# Patient Record
Sex: FEMALE | Race: WHITE | NOT HISPANIC OR LATINO | URBAN - METROPOLITAN AREA
[De-identification: names, ages, dates, MRNs, and addresses within clinical notes are randomized per-mention and may not be internally consistent; named-entity substitution may affect disease eponyms.]

---

## 2017-06-07 ENCOUNTER — ON CAMPUS - OUTPATIENT (AMBULATORY)
Dept: URBAN - METROPOLITAN AREA HOSPITAL 2 | Facility: HOSPITAL | Age: 67
End: 2017-06-07

## 2017-06-07 ENCOUNTER — OFFICE (AMBULATORY)
Dept: URBAN - METROPOLITAN AREA CLINIC 64 | Facility: CLINIC | Age: 67
End: 2017-06-07

## 2017-06-07 ENCOUNTER — HOSPITAL ENCOUNTER (OUTPATIENT)
Dept: OTHER | Facility: HOSPITAL | Age: 67
Setting detail: SPECIMEN
Discharge: HOME | End: 2017-06-07
Attending: INTERNAL MEDICINE | Admitting: INTERNAL MEDICINE

## 2017-06-07 VITALS
RESPIRATION RATE: 18 BRPM | HEART RATE: 60 BPM | SYSTOLIC BLOOD PRESSURE: 119 MMHG | HEART RATE: 69 BPM | OXYGEN SATURATION: 98 % | DIASTOLIC BLOOD PRESSURE: 81 MMHG | HEART RATE: 78 BPM | OXYGEN SATURATION: 95 % | TEMPERATURE: 97.6 F | SYSTOLIC BLOOD PRESSURE: 122 MMHG | RESPIRATION RATE: 16 BRPM | HEART RATE: 57 BPM | DIASTOLIC BLOOD PRESSURE: 70 MMHG | OXYGEN SATURATION: 94 % | SYSTOLIC BLOOD PRESSURE: 115 MMHG | WEIGHT: 238 LBS | HEART RATE: 79 BPM | RESPIRATION RATE: 20 BRPM | DIASTOLIC BLOOD PRESSURE: 65 MMHG | DIASTOLIC BLOOD PRESSURE: 55 MMHG | OXYGEN SATURATION: 96 % | SYSTOLIC BLOOD PRESSURE: 137 MMHG | HEART RATE: 56 BPM | DIASTOLIC BLOOD PRESSURE: 62 MMHG | SYSTOLIC BLOOD PRESSURE: 133 MMHG | HEIGHT: 61 IN | SYSTOLIC BLOOD PRESSURE: 116 MMHG

## 2017-06-07 DIAGNOSIS — K22.2 ESOPHAGEAL OBSTRUCTION: ICD-10-CM

## 2017-06-07 DIAGNOSIS — K29.70 GASTRITIS, UNSPECIFIED, WITHOUT BLEEDING: ICD-10-CM

## 2017-06-07 DIAGNOSIS — K31.9 DISEASE OF STOMACH AND DUODENUM, UNSPECIFIED: ICD-10-CM

## 2017-06-07 DIAGNOSIS — K26.7 CHRONIC DUODENAL ULCER WITHOUT HEMORRHAGE OR PERFORATION: ICD-10-CM

## 2017-06-07 DIAGNOSIS — R13.10 DYSPHAGIA, UNSPECIFIED: ICD-10-CM

## 2017-06-07 DIAGNOSIS — R07.89 OTHER CHEST PAIN: ICD-10-CM

## 2017-06-07 LAB
GI HISTOLOGY: A. SELECT: (no result)
GI HISTOLOGY: PDF REPORT: (no result)

## 2017-06-07 PROCEDURE — 88342 IMHCHEM/IMCYTCHM 1ST ANTB: CPT | Mod: 26 | Performed by: INTERNAL MEDICINE

## 2017-06-07 PROCEDURE — 43239 EGD BIOPSY SINGLE/MULTIPLE: CPT | Performed by: INTERNAL MEDICINE

## 2017-06-07 PROCEDURE — 88305 TISSUE EXAM BY PATHOLOGIST: CPT | Mod: 26 | Performed by: INTERNAL MEDICINE

## 2017-06-07 PROCEDURE — 43450 DILATE ESOPHAGUS 1/MULT PASS: CPT | Performed by: INTERNAL MEDICINE

## 2017-06-07 RX ORDER — PANTOPRAZOLE SODIUM 40 MG/1
40 TABLET, DELAYED RELEASE ORAL
Qty: 90 | Refills: 3 | Status: ACTIVE
Start: 2017-06-07

## 2017-06-07 RX ADMIN — PROPOFOL: 10 INJECTION, EMULSION INTRAVENOUS at 11:35

## 2020-01-01 ENCOUNTER — APPOINTMENT (OUTPATIENT)
Dept: CARDIOLOGY | Facility: HOSPITAL | Age: 70
End: 2020-01-01

## 2020-01-01 ENCOUNTER — HOSPITAL ENCOUNTER (OUTPATIENT)
Dept: ONCOLOGY | Facility: HOSPITAL | Age: 70
Setting detail: INFUSION SERIES
Discharge: HOME | End: 2020-05-06

## 2020-01-01 ENCOUNTER — HOSPITAL ENCOUNTER (OUTPATIENT)
Dept: RADIATION ONCOLOGY | Facility: HOSPITAL | Age: 70
Setting detail: RADIATION/ONCOLOGY SERIES
End: 2020-01-01

## 2020-01-01 ENCOUNTER — HOSPITAL ENCOUNTER (OUTPATIENT)
Dept: ONCOLOGY | Facility: HOSPITAL | Age: 70
Setting detail: INFUSION SERIES
End: 2020-01-01

## 2020-01-01 ENCOUNTER — OFFICE VISIT (OUTPATIENT)
Dept: ONCOLOGY | Facility: CLINIC | Age: 70
End: 2020-01-01

## 2020-01-01 ENCOUNTER — HOSPITAL ENCOUNTER (OUTPATIENT)
Dept: ONCOLOGY | Facility: HOSPITAL | Age: 70
Setting detail: INFUSION SERIES
Discharge: HOME | End: 2020-06-30

## 2020-01-01 ENCOUNTER — APPOINTMENT (OUTPATIENT)
Dept: ONCOLOGY | Facility: HOSPITAL | Age: 70
End: 2020-01-01

## 2020-01-01 ENCOUNTER — TELEPHONE (OUTPATIENT)
Dept: ONCOLOGY | Facility: HOSPITAL | Age: 70
End: 2020-01-01

## 2020-01-01 ENCOUNTER — HOSPITAL ENCOUNTER (OUTPATIENT)
Dept: GENERAL RADIOLOGY | Facility: HOSPITAL | Age: 70
Discharge: HOME | End: 2020-11-10

## 2020-01-01 ENCOUNTER — HOSPITAL ENCOUNTER (OUTPATIENT)
Dept: RADIATION ONCOLOGY | Facility: HOSPITAL | Age: 70
Discharge: HOME | End: 2020-07-06

## 2020-01-01 ENCOUNTER — CONSULT (OUTPATIENT)
Dept: ONCOLOGY | Facility: CLINIC | Age: 70
End: 2020-01-01

## 2020-01-01 ENCOUNTER — OFFICE VISIT (OUTPATIENT)
Dept: RADIATION ONCOLOGY | Facility: HOSPITAL | Age: 70
End: 2020-01-01

## 2020-01-01 ENCOUNTER — HOSPITAL ENCOUNTER (OUTPATIENT)
Dept: ONCOLOGY | Facility: HOSPITAL | Age: 70
Setting detail: INFUSION SERIES
Discharge: HOME | End: 2020-04-08

## 2020-01-01 ENCOUNTER — DOCUMENTATION (OUTPATIENT)
Dept: RADIATION ONCOLOGY | Facility: HOSPITAL | Age: 70
End: 2020-01-01

## 2020-01-01 ENCOUNTER — TELEPHONE (OUTPATIENT)
Dept: RADIATION ONCOLOGY | Facility: HOSPITAL | Age: 70
End: 2020-01-01

## 2020-01-01 ENCOUNTER — HOSPITAL ENCOUNTER (OUTPATIENT)
Facility: HOSPITAL | Age: 70
Setting detail: HOSPITAL OUTPATIENT SURGERY
Discharge: HOME | End: 2020-11-12
Attending: INTERNAL MEDICINE | Admitting: INTERNAL MEDICINE

## 2020-01-01 ENCOUNTER — APPOINTMENT (OUTPATIENT)
Dept: ONCOLOGY | Facility: CLINIC | Age: 70
End: 2020-01-01

## 2020-01-01 ENCOUNTER — HOSPITAL ENCOUNTER (OUTPATIENT)
Dept: RADIATION ONCOLOGY | Facility: HOSPITAL | Age: 70
Discharge: HOME | End: 2020-07-09

## 2020-01-01 ENCOUNTER — HOSPITAL ENCOUNTER (OUTPATIENT)
Dept: RADIATION ONCOLOGY | Facility: HOSPITAL | Age: 70
Setting detail: RADIATION/ONCOLOGY SERIES
Discharge: HOME | End: 2020-06-30

## 2020-01-01 ENCOUNTER — HOSPITAL ENCOUNTER (OUTPATIENT)
Dept: RADIATION ONCOLOGY | Facility: HOSPITAL | Age: 70
Discharge: HOME | End: 2020-07-01

## 2020-01-01 ENCOUNTER — HOSPITAL ENCOUNTER (OUTPATIENT)
Dept: RADIATION ONCOLOGY | Facility: HOSPITAL | Age: 70
Discharge: HOME | End: 2020-06-30

## 2020-01-01 ENCOUNTER — OFFICE VISIT (OUTPATIENT)
Dept: CARDIOLOGY | Facility: CLINIC | Age: 70
End: 2020-01-01

## 2020-01-01 ENCOUNTER — TELEPHONE (OUTPATIENT)
Dept: ONCOLOGY | Facility: CLINIC | Age: 70
End: 2020-01-01

## 2020-01-01 ENCOUNTER — HOSPITAL ENCOUNTER (OUTPATIENT)
Dept: RADIATION ONCOLOGY | Facility: HOSPITAL | Age: 70
Discharge: HOME | End: 2020-07-13

## 2020-01-01 ENCOUNTER — TELEPHONE (OUTPATIENT)
Dept: CARDIOLOGY | Facility: CLINIC | Age: 70
End: 2020-01-01

## 2020-01-01 ENCOUNTER — HOSPITAL ENCOUNTER (OUTPATIENT)
Dept: RADIATION ONCOLOGY | Facility: HOSPITAL | Age: 70
Discharge: HOME | End: 2020-07-15

## 2020-01-01 ENCOUNTER — HOSPITAL ENCOUNTER (OUTPATIENT)
Dept: RADIATION ONCOLOGY | Facility: HOSPITAL | Age: 70
Discharge: HOME | End: 2020-07-07

## 2020-01-01 ENCOUNTER — CONSULT (OUTPATIENT)
Dept: RADIATION ONCOLOGY | Facility: HOSPITAL | Age: 70
End: 2020-01-01

## 2020-01-01 ENCOUNTER — HOSPITAL ENCOUNTER (OUTPATIENT)
Dept: RADIATION ONCOLOGY | Facility: HOSPITAL | Age: 70
Discharge: HOME | End: 2020-07-02

## 2020-01-01 ENCOUNTER — APPOINTMENT (OUTPATIENT)
Dept: LAB | Facility: HOSPITAL | Age: 70
End: 2020-01-01

## 2020-01-01 ENCOUNTER — HOSPITAL ENCOUNTER (OUTPATIENT)
Dept: RADIATION ONCOLOGY | Facility: HOSPITAL | Age: 70
Discharge: HOME | End: 2020-07-10

## 2020-01-01 ENCOUNTER — HOSPITAL ENCOUNTER (OUTPATIENT)
Dept: ONCOLOGY | Facility: HOSPITAL | Age: 70
Setting detail: INFUSION SERIES
Discharge: HOME | End: 2020-06-08

## 2020-01-01 ENCOUNTER — DOCUMENTATION (OUTPATIENT)
Dept: ONCOLOGY | Facility: HOSPITAL | Age: 70
End: 2020-01-01

## 2020-01-01 ENCOUNTER — LAB (OUTPATIENT)
Dept: LAB | Facility: HOSPITAL | Age: 70
End: 2020-01-01

## 2020-01-01 ENCOUNTER — HOSPITAL ENCOUNTER (OUTPATIENT)
Dept: RADIATION ONCOLOGY | Facility: HOSPITAL | Age: 70
Discharge: HOME | End: 2020-07-14

## 2020-01-01 ENCOUNTER — HOSPITAL ENCOUNTER (OUTPATIENT)
Dept: RADIATION ONCOLOGY | Facility: HOSPITAL | Age: 70
Discharge: HOME | End: 2020-07-08

## 2020-01-01 ENCOUNTER — HOSPITAL ENCOUNTER (OUTPATIENT)
Dept: ONCOLOGY | Facility: HOSPITAL | Age: 70
Setting detail: INFUSION SERIES
Discharge: HOME | End: 2020-12-08

## 2020-01-01 ENCOUNTER — HOSPITAL ENCOUNTER (OUTPATIENT)
Dept: ONCOLOGY | Facility: HOSPITAL | Age: 70
Setting detail: INFUSION SERIES
Discharge: HOME | End: 2020-08-03

## 2020-01-01 ENCOUNTER — DOCUMENTATION (OUTPATIENT)
Dept: LAB | Facility: HOSPITAL | Age: 70
End: 2020-01-01

## 2020-01-01 ENCOUNTER — HOSPITAL ENCOUNTER (OUTPATIENT)
Dept: PET IMAGING | Facility: HOSPITAL | Age: 70
Discharge: HOME | End: 2020-10-14
Admitting: RADIOLOGY

## 2020-01-01 ENCOUNTER — HOSPITAL ENCOUNTER (OUTPATIENT)
Dept: ONCOLOGY | Facility: HOSPITAL | Age: 70
Setting detail: INFUSION SERIES
Discharge: HOME | End: 2020-06-03

## 2020-01-01 VITALS
HEART RATE: 79 BPM | HEIGHT: 61 IN | RESPIRATION RATE: 18 BRPM | BODY MASS INDEX: 39.65 KG/M2 | SYSTOLIC BLOOD PRESSURE: 143 MMHG | WEIGHT: 210 LBS | DIASTOLIC BLOOD PRESSURE: 66 MMHG | TEMPERATURE: 97.5 F

## 2020-01-01 VITALS
DIASTOLIC BLOOD PRESSURE: 75 MMHG | TEMPERATURE: 97.8 F | HEART RATE: 90 BPM | BODY MASS INDEX: 40.22 KG/M2 | SYSTOLIC BLOOD PRESSURE: 139 MMHG | HEIGHT: 61 IN | WEIGHT: 213 LBS | OXYGEN SATURATION: 94 %

## 2020-01-01 VITALS
BODY MASS INDEX: 38.59 KG/M2 | DIASTOLIC BLOOD PRESSURE: 70 MMHG | SYSTOLIC BLOOD PRESSURE: 136 MMHG | WEIGHT: 204.39 LBS | RESPIRATION RATE: 17 BRPM | TEMPERATURE: 97.5 F | HEART RATE: 64 BPM | HEIGHT: 61 IN | OXYGEN SATURATION: 94 %

## 2020-01-01 VITALS
SYSTOLIC BLOOD PRESSURE: 116 MMHG | TEMPERATURE: 98.2 F | HEIGHT: 61 IN | BODY MASS INDEX: 39.65 KG/M2 | HEART RATE: 102 BPM | DIASTOLIC BLOOD PRESSURE: 73 MMHG | WEIGHT: 210 LBS

## 2020-01-01 VITALS
DIASTOLIC BLOOD PRESSURE: 75 MMHG | RESPIRATION RATE: 24 BRPM | WEIGHT: 209 LBS | OXYGEN SATURATION: 95 % | TEMPERATURE: 98 F | HEART RATE: 84 BPM | HEIGHT: 61 IN | BODY MASS INDEX: 39.46 KG/M2 | SYSTOLIC BLOOD PRESSURE: 161 MMHG

## 2020-01-01 VITALS
TEMPERATURE: 97.3 F | OXYGEN SATURATION: 92 % | BODY MASS INDEX: 35.68 KG/M2 | WEIGHT: 189 LBS | SYSTOLIC BLOOD PRESSURE: 106 MMHG | HEART RATE: 99 BPM | HEIGHT: 61 IN | DIASTOLIC BLOOD PRESSURE: 72 MMHG

## 2020-01-01 VITALS
DIASTOLIC BLOOD PRESSURE: 73 MMHG | BODY MASS INDEX: 40.22 KG/M2 | RESPIRATION RATE: 18 BRPM | WEIGHT: 213 LBS | SYSTOLIC BLOOD PRESSURE: 126 MMHG | TEMPERATURE: 98 F | HEIGHT: 61 IN | HEART RATE: 98 BPM

## 2020-01-01 VITALS
RESPIRATION RATE: 18 BRPM | OXYGEN SATURATION: 94 % | HEIGHT: 61 IN | BODY MASS INDEX: 38.71 KG/M2 | HEART RATE: 89 BPM | WEIGHT: 205 LBS | SYSTOLIC BLOOD PRESSURE: 129 MMHG | TEMPERATURE: 97.3 F | DIASTOLIC BLOOD PRESSURE: 76 MMHG

## 2020-01-01 VITALS
RESPIRATION RATE: 18 BRPM | DIASTOLIC BLOOD PRESSURE: 63 MMHG | BODY MASS INDEX: 38.71 KG/M2 | WEIGHT: 205 LBS | OXYGEN SATURATION: 95 % | SYSTOLIC BLOOD PRESSURE: 90 MMHG | HEIGHT: 61 IN | HEART RATE: 65 BPM

## 2020-01-01 VITALS
HEART RATE: 66 BPM | DIASTOLIC BLOOD PRESSURE: 70 MMHG | SYSTOLIC BLOOD PRESSURE: 112 MMHG | HEIGHT: 61 IN | WEIGHT: 209 LBS | RESPIRATION RATE: 20 BRPM | TEMPERATURE: 97.5 F | BODY MASS INDEX: 39.46 KG/M2

## 2020-01-01 VITALS
BODY MASS INDEX: 38.76 KG/M2 | TEMPERATURE: 98.2 F | HEART RATE: 99 BPM | HEIGHT: 61 IN | RESPIRATION RATE: 18 BRPM | WEIGHT: 205.3 LBS | DIASTOLIC BLOOD PRESSURE: 75 MMHG | SYSTOLIC BLOOD PRESSURE: 132 MMHG

## 2020-01-01 VITALS — HEIGHT: 61 IN | BODY MASS INDEX: 38.71 KG/M2 | WEIGHT: 205 LBS | RESPIRATION RATE: 16 BRPM

## 2020-01-01 VITALS
TEMPERATURE: 97.6 F | WEIGHT: 211 LBS | HEIGHT: 57 IN | DIASTOLIC BLOOD PRESSURE: 76 MMHG | RESPIRATION RATE: 18 BRPM | HEART RATE: 88 BPM | BODY MASS INDEX: 45.52 KG/M2 | SYSTOLIC BLOOD PRESSURE: 156 MMHG

## 2020-01-01 VITALS
WEIGHT: 193 LBS | HEIGHT: 61 IN | DIASTOLIC BLOOD PRESSURE: 73 MMHG | RESPIRATION RATE: 18 BRPM | SYSTOLIC BLOOD PRESSURE: 144 MMHG | OXYGEN SATURATION: 93 % | HEART RATE: 77 BPM | BODY MASS INDEX: 36.44 KG/M2

## 2020-01-01 VITALS
WEIGHT: 204 LBS | DIASTOLIC BLOOD PRESSURE: 85 MMHG | SYSTOLIC BLOOD PRESSURE: 129 MMHG | HEIGHT: 61 IN | TEMPERATURE: 98.4 F | HEART RATE: 92 BPM | BODY MASS INDEX: 38.51 KG/M2

## 2020-01-01 VITALS
TEMPERATURE: 97.3 F | RESPIRATION RATE: 20 BRPM | HEIGHT: 61 IN | OXYGEN SATURATION: 94 % | WEIGHT: 188.9 LBS | HEART RATE: 99 BPM | BODY MASS INDEX: 35.66 KG/M2 | SYSTOLIC BLOOD PRESSURE: 106 MMHG | DIASTOLIC BLOOD PRESSURE: 72 MMHG

## 2020-01-01 VITALS
HEART RATE: 78 BPM | WEIGHT: 205 LBS | BODY MASS INDEX: 38.73 KG/M2 | SYSTOLIC BLOOD PRESSURE: 120 MMHG | OXYGEN SATURATION: 97 % | DIASTOLIC BLOOD PRESSURE: 80 MMHG

## 2020-01-01 DIAGNOSIS — R60.0 EDEMA LEG: ICD-10-CM

## 2020-01-01 DIAGNOSIS — I25.10 CAD S/P PERCUTANEOUS CORONARY ANGIOPLASTY: ICD-10-CM

## 2020-01-01 DIAGNOSIS — C7B.8 NEUROENDOCRINE CARCINOMA METASTATIC TO MULTIPLE SITES (HCC): ICD-10-CM

## 2020-01-01 DIAGNOSIS — Z98.61 CAD S/P PERCUTANEOUS CORONARY ANGIOPLASTY: ICD-10-CM

## 2020-01-01 DIAGNOSIS — C7B.8 NEUROENDOCRINE CARCINOMA METASTATIC TO MULTIPLE SITES (HCC): Primary | ICD-10-CM

## 2020-01-01 DIAGNOSIS — I20.0 UNSTABLE ANGINA PECTORIS (HCC): Primary | ICD-10-CM

## 2020-01-01 DIAGNOSIS — I10 ESSENTIAL HYPERTENSION: ICD-10-CM

## 2020-01-01 DIAGNOSIS — R06.09 DYSPNEA ON EXERTION: ICD-10-CM

## 2020-01-01 DIAGNOSIS — C7A.8 NEUROENDOCRINE CARCINOMA METASTATIC TO MULTIPLE SITES (HCC): Primary | ICD-10-CM

## 2020-01-01 DIAGNOSIS — E66.01 MORBIDLY OBESE (HCC): ICD-10-CM

## 2020-01-01 DIAGNOSIS — E11.9 TYPE 2 DIABETES MELLITUS WITHOUT COMPLICATION, WITHOUT LONG-TERM CURRENT USE OF INSULIN (HCC): ICD-10-CM

## 2020-01-01 DIAGNOSIS — C7A.8 NEUROENDOCRINE CARCINOMA METASTATIC TO MULTIPLE SITES (HCC): ICD-10-CM

## 2020-01-01 DIAGNOSIS — I20.0 UNSTABLE ANGINA PECTORIS (HCC): ICD-10-CM

## 2020-01-01 DIAGNOSIS — R73.9 HYPERGLYCEMIA: ICD-10-CM

## 2020-01-01 DIAGNOSIS — C7A.8 PRIMARY MALIGNANT NEUROENDOCRINE TUMOR OF PANCREAS (HCC): ICD-10-CM

## 2020-01-01 DIAGNOSIS — Z82.49 FAMILY HISTORY OF PREMATURE CAD: ICD-10-CM

## 2020-01-01 DIAGNOSIS — E78.5 DYSLIPIDEMIA: ICD-10-CM

## 2020-01-01 DIAGNOSIS — C7A.8 PRIMARY MALIGNANT NEUROENDOCRINE TUMOR OF PANCREAS (HCC): Primary | ICD-10-CM

## 2020-01-01 LAB
5OH-INDOLEACETATE 24H UR-MCNC: 3.5 MG/L
5OH-INDOLEACETATE 24H UR-MRATE: 3.5 MG/24 HR (ref 0–14.9)
ALBUMIN SERPL-MCNC: 3.8 G/DL (ref 3.5–5.2)
ALBUMIN SERPL-MCNC: 3.9 G/DL (ref 3.5–5.2)
ALBUMIN SERPL-MCNC: 4 G/DL (ref 3.5–5.2)
ALBUMIN SERPL-MCNC: 4.1 G/DL (ref 3.5–5.2)
ALBUMIN SERPL-MCNC: 4.2 G/DL (ref 3.5–5.2)
ALBUMIN SERPL-MCNC: 4.2 G/DL (ref 3.5–5.2)
ALBUMIN SERPL-MCNC: 4.3 G/DL (ref 3.5–5.2)
ALBUMIN/GLOB SERPL: 1.3 G/DL
ALBUMIN/GLOB SERPL: 1.3 G/DL
ALBUMIN/GLOB SERPL: 1.4 G/DL
ALBUMIN/GLOB SERPL: 1.6 G/DL
ALBUMIN/GLOB SERPL: 1.8 G/DL
ALP SERPL-CCNC: 53 U/L (ref 39–117)
ALP SERPL-CCNC: 58 U/L (ref 39–117)
ALP SERPL-CCNC: 59 U/L (ref 39–117)
ALP SERPL-CCNC: 59 U/L (ref 39–117)
ALP SERPL-CCNC: 62 U/L (ref 39–117)
ALP SERPL-CCNC: 63 U/L (ref 39–117)
ALP SERPL-CCNC: 63 U/L (ref 39–117)
ALT SERPL W P-5'-P-CCNC: 12 U/L (ref 1–33)
ALT SERPL W P-5'-P-CCNC: 15 U/L (ref 1–33)
ALT SERPL W P-5'-P-CCNC: 17 U/L (ref 1–33)
ALT SERPL W P-5'-P-CCNC: 19 U/L (ref 1–33)
ALT SERPL W P-5'-P-CCNC: 20 U/L (ref 1–33)
ALT SERPL W P-5'-P-CCNC: 21 U/L (ref 1–33)
ALT SERPL W P-5'-P-CCNC: 23 U/L (ref 1–33)
ANION GAP SERPL CALCULATED.3IONS-SCNC: 11 MMOL/L (ref 5–15)
ANION GAP SERPL CALCULATED.3IONS-SCNC: 11 MMOL/L (ref 5–15)
ANION GAP SERPL CALCULATED.3IONS-SCNC: 13 MMOL/L (ref 5–15)
ANION GAP SERPL CALCULATED.3IONS-SCNC: 14 MMOL/L (ref 5–15)
ANION GAP SERPL CALCULATED.3IONS-SCNC: 15 MMOL/L (ref 5–15)
ANION GAP SERPL CALCULATED.3IONS-SCNC: 8.7 MMOL/L (ref 5–15)
AST SERPL-CCNC: 19 U/L (ref 1–32)
AST SERPL-CCNC: 20 U/L (ref 1–32)
AST SERPL-CCNC: 23 U/L (ref 1–32)
AST SERPL-CCNC: 25 U/L (ref 1–32)
AST SERPL-CCNC: 26 U/L (ref 1–32)
AST SERPL-CCNC: 26 U/L (ref 1–32)
AST SERPL-CCNC: 27 U/L (ref 1–32)
BASOPHILS # BLD AUTO: 0.03 10*3/MM3 (ref 0–0.2)
BASOPHILS # BLD AUTO: 0.04 10*3/MM3 (ref 0–0.2)
BASOPHILS # BLD AUTO: 0.04 10*3/MM3 (ref 0–0.2)
BASOPHILS # BLD AUTO: 0.05 10*3/MM3 (ref 0–0.2)
BASOPHILS # BLD AUTO: 0.06 10*3/MM3 (ref 0–0.2)
BASOPHILS # BLD AUTO: 0.1 10*3/MM3 (ref 0–0.2)
BASOPHILS # BLD AUTO: 0.1 X10E3/UL (ref 0–0.2)
BASOPHILS NFR BLD AUTO: 0.4 % (ref 0–1.5)
BASOPHILS NFR BLD AUTO: 0.5 % (ref 0–1.5)
BASOPHILS NFR BLD AUTO: 0.6 % (ref 0–1.5)
BASOPHILS NFR BLD AUTO: 0.7 % (ref 0–1.5)
BASOPHILS NFR BLD AUTO: 1 %
BASOPHILS NFR BLD AUTO: 1.4 % (ref 0–1.5)
BILIRUB SERPL-MCNC: 0.4 MG/DL (ref 0.2–1.2)
BILIRUB SERPL-MCNC: 0.5 MG/DL (ref 0.2–1.2)
BILIRUB SERPL-MCNC: 0.5 MG/DL (ref 0–1.2)
BILIRUB SERPL-MCNC: 0.6 MG/DL (ref 0.2–1.2)
BILIRUB SERPL-MCNC: 0.7 MG/DL (ref 0.2–1.2)
BILIRUB SERPL-MCNC: 0.7 MG/DL (ref 0.2–1.2)
BILIRUB SERPL-MCNC: 0.7 MG/DL (ref 0–1.2)
BUN BLD-MCNC: 13 MG/DL (ref 8–23)
BUN BLD-MCNC: 14 MG/DL (ref 8–23)
BUN BLD-MCNC: 15 MG/DL (ref 8–23)
BUN BLD-MCNC: 18 MG/DL (ref 8–23)
BUN BLD-MCNC: 20 MG/DL (ref 8–23)
BUN BLD-MCNC: ABNORMAL MG/DL
BUN SERPL-MCNC: 17 MG/DL (ref 8–23)
BUN SERPL-MCNC: 17 MG/DL (ref 8–23)
BUN SERPL-MCNC: 18 MG/DL (ref 8–23)
BUN SERPL-MCNC: 19 MG/DL (ref 8–23)
BUN SERPL-MCNC: ABNORMAL MG/DL
BUN SERPL-MCNC: ABNORMAL MG/DL
BUN/CREAT SERPL: 15.7 (ref 7–25)
BUN/CREAT SERPL: 17.9 (ref 7–25)
BUN/CREAT SERPL: 20.2 (ref 7–25)
BUN/CREAT SERPL: 21.2 (ref 7–25)
BUN/CREAT SERPL: 22.1 (ref 7–25)
BUN/CREAT SERPL: 23.5 (ref 7–25)
BUN/CREAT SERPL: ABNORMAL
CALCIUM SPEC-SCNC: 10.2 MG/DL (ref 8.6–10.5)
CALCIUM SPEC-SCNC: 10.4 MG/DL (ref 8.6–10.5)
CALCIUM SPEC-SCNC: 10.5 MG/DL (ref 8.6–10.5)
CALCIUM SPEC-SCNC: 10.6 MG/DL (ref 8.6–10.5)
CALCIUM SPEC-SCNC: 11.2 MG/DL (ref 8.6–10.5)
CALCIUM SPEC-SCNC: 9.9 MG/DL (ref 8.6–10.5)
CALCIUM SPEC-SCNC: 9.9 MG/DL (ref 8.6–10.5)
CEA SERPL-MCNC: 4.84 NG/ML
CHLORIDE SERPL-SCNC: 102 MMOL/L (ref 98–107)
CHLORIDE SERPL-SCNC: 103 MMOL/L (ref 98–107)
CHLORIDE SERPL-SCNC: 104 MMOL/L (ref 98–107)
CHLORIDE SERPL-SCNC: 104 MMOL/L (ref 98–107)
CHLORIDE SERPL-SCNC: 106 MMOL/L (ref 98–107)
CO2 SERPL-SCNC: 21 MMOL/L (ref 22–29)
CO2 SERPL-SCNC: 23 MMOL/L (ref 22–29)
CO2 SERPL-SCNC: 24 MMOL/L (ref 22–29)
CO2 SERPL-SCNC: 25 MMOL/L (ref 22–29)
CO2 SERPL-SCNC: 25 MMOL/L (ref 22–29)
CO2 SERPL-SCNC: 27 MMOL/L (ref 22–29)
CO2 SERPL-SCNC: 27.3 MMOL/L (ref 22–29)
CREAT BLD-MCNC: 0.78 MG/DL (ref 0.57–1)
CREAT BLD-MCNC: 0.83 MG/DL (ref 0.57–1)
CREAT BLD-MCNC: 0.84 MG/DL (ref 0.57–1)
CREAT BLD-MCNC: 0.85 MG/DL (ref 0.57–1)
CREAT BLD-MCNC: 0.89 MG/DL (ref 0.57–1)
CREAT BLDA-MCNC: 0.6 MG/DL (ref 0.6–1.3)
CREAT SERPL-MCNC: 0.77 MG/DL (ref 0.57–1)
CREAT SERPL-MCNC: 0.79 MG/DL (ref 0.57–1)
CREAT SERPL-MCNC: 0.79 MG/DL (ref 0.57–1)
CREAT SERPL-MCNC: 0.85 MG/DL (ref 0.57–1)
DEPRECATED RDW RBC AUTO: 40.1 FL (ref 37–54)
DEPRECATED RDW RBC AUTO: 43.6 FL (ref 37–54)
DEPRECATED RDW RBC AUTO: 44.3 FL (ref 37–54)
DEPRECATED RDW RBC AUTO: 45.1 FL (ref 37–54)
DEPRECATED RDW RBC AUTO: 45.5 FL (ref 37–54)
DEPRECATED RDW RBC AUTO: 45.9 FL (ref 37–54)
DEPRECATED RDW RBC AUTO: 46.2 FL (ref 37–54)
DEPRECATED RDW RBC AUTO: 46.4 FL (ref 37–54)
EOSINOPHIL # BLD AUTO: 0.2 X10E3/UL (ref 0–0.4)
EOSINOPHIL # BLD AUTO: 0.23 10*3/MM3 (ref 0–0.4)
EOSINOPHIL # BLD AUTO: 0.28 10*3/MM3 (ref 0–0.4)
EOSINOPHIL # BLD AUTO: 0.31 10*3/MM3 (ref 0–0.4)
EOSINOPHIL # BLD AUTO: 0.4 10*3/MM3 (ref 0–0.4)
EOSINOPHIL # BLD AUTO: 0.43 10*3/MM3 (ref 0–0.4)
EOSINOPHIL # BLD AUTO: 0.5 10*3/MM3 (ref 0–0.4)
EOSINOPHIL # BLD AUTO: 0.9 10*3/MM3 (ref 0–0.4)
EOSINOPHIL # BLD AUTO: 1.08 10*3/MM3 (ref 0–0.4)
EOSINOPHIL # BLD AUTO: 3 %
EOSINOPHIL NFR BLD AUTO: 10.4 % (ref 0.3–6.2)
EOSINOPHIL NFR BLD AUTO: 12.8 % (ref 0.3–6.2)
EOSINOPHIL NFR BLD AUTO: 3.7 % (ref 0.3–6.2)
EOSINOPHIL NFR BLD AUTO: 4 % (ref 0.3–6.2)
EOSINOPHIL NFR BLD AUTO: 4.1 % (ref 0.3–6.2)
EOSINOPHIL NFR BLD AUTO: 4.3 % (ref 0.3–6.2)
EOSINOPHIL NFR BLD AUTO: 4.5 % (ref 0.3–6.2)
EOSINOPHIL NFR BLD AUTO: 5.6 % (ref 0.3–6.2)
ERYTHROCYTE [DISTWIDTH] IN BLOOD BY AUTOMATED COUNT: 13 % (ref 12.3–15.4)
ERYTHROCYTE [DISTWIDTH] IN BLOOD BY AUTOMATED COUNT: 13.1 % (ref 11.7–15.4)
ERYTHROCYTE [DISTWIDTH] IN BLOOD BY AUTOMATED COUNT: 14.2 % (ref 12.3–15.4)
ERYTHROCYTE [DISTWIDTH] IN BLOOD BY AUTOMATED COUNT: 14.2 % (ref 12.3–15.4)
ERYTHROCYTE [DISTWIDTH] IN BLOOD BY AUTOMATED COUNT: 14.4 % (ref 12.3–15.4)
ERYTHROCYTE [DISTWIDTH] IN BLOOD BY AUTOMATED COUNT: 14.6 % (ref 12.3–15.4)
ERYTHROCYTE [DISTWIDTH] IN BLOOD BY AUTOMATED COUNT: 14.8 % (ref 12.3–15.4)
GFR SERPL CREATININE-BSD FRML MDRD: 63 ML/MIN/1.73
GFR SERPL CREATININE-BSD FRML MDRD: 66 ML/MIN/1.73
GFR SERPL CREATININE-BSD FRML MDRD: 66 ML/MIN/1.73
GFR SERPL CREATININE-BSD FRML MDRD: 67 ML/MIN/1.73
GFR SERPL CREATININE-BSD FRML MDRD: 68 ML/MIN/1.73
GFR SERPL CREATININE-BSD FRML MDRD: 72 ML/MIN/1.73
GFR SERPL CREATININE-BSD FRML MDRD: 72 ML/MIN/1.73
GFR SERPL CREATININE-BSD FRML MDRD: 73 ML/MIN/1.73
GFR SERPL CREATININE-BSD FRML MDRD: 74 ML/MIN/1.73
GLOBULIN UR ELPH-MCNC: 2.3 GM/DL
GLOBULIN UR ELPH-MCNC: 2.7 GM/DL
GLOBULIN UR ELPH-MCNC: 2.9 GM/DL
GLOBULIN UR ELPH-MCNC: 3 GM/DL
GLUCOSE BLD-MCNC: 112 MG/DL (ref 65–99)
GLUCOSE BLD-MCNC: 115 MG/DL (ref 65–99)
GLUCOSE BLD-MCNC: 128 MG/DL (ref 65–99)
GLUCOSE BLD-MCNC: 158 MG/DL (ref 65–99)
GLUCOSE BLD-MCNC: 239 MG/DL (ref 65–99)
GLUCOSE BLDC GLUCOMTR-MCNC: 157 MG/DL (ref 70–105)
GLUCOSE SERPL-MCNC: 148 MG/DL (ref 65–99)
GLUCOSE SERPL-MCNC: 165 MG/DL (ref 65–99)
GLUCOSE SERPL-MCNC: 177 MG/DL (ref 65–99)
GLUCOSE SERPL-MCNC: 77 MG/DL (ref 65–99)
HCT VFR BLD AUTO: 37.9 % (ref 34–46.6)
HCT VFR BLD AUTO: 39.4 % (ref 34–46.6)
HCT VFR BLD AUTO: 40.5 % (ref 34–46.6)
HCT VFR BLD AUTO: 40.6 % (ref 34–46.6)
HCT VFR BLD AUTO: 41 % (ref 34–46.6)
HCT VFR BLD AUTO: 41.9 % (ref 34–46.6)
HCT VFR BLD AUTO: 42.1 % (ref 34–46.6)
HCT VFR BLD AUTO: 43.2 % (ref 34–46.6)
HCT VFR BLD AUTO: 43.4 % (ref 34–46.6)
HGB BLD-MCNC: 12.7 G/DL (ref 12–15.9)
HGB BLD-MCNC: 13.1 G/DL (ref 12–15.9)
HGB BLD-MCNC: 13.1 G/DL (ref 12–15.9)
HGB BLD-MCNC: 13.2 G/DL (ref 12–15.9)
HGB BLD-MCNC: 13.3 G/DL (ref 12–15.9)
HGB BLD-MCNC: 13.7 G/DL (ref 11.1–15.9)
HGB BLD-MCNC: 13.9 G/DL (ref 12–15.9)
HGB BLD-MCNC: 14 G/DL (ref 12–15.9)
HGB BLD-MCNC: 14.2 G/DL (ref 12–15.9)
IMM GRANULOCYTES # BLD AUTO: 0.07 10*3/MM3 (ref 0–0.05)
IMM GRANULOCYTES # BLD: 0 X10E3/UL (ref 0–0.1)
IMM GRANULOCYTES NFR BLD AUTO: 0.7 % (ref 0–0.5)
IMM GRANULOCYTES NFR BLD: 1 %
INR PPP: 1.02 (ref 0.93–1.1)
LYMPHOCYTES # BLD AUTO: 0.89 10*3/MM3 (ref 0.7–3.1)
LYMPHOCYTES # BLD AUTO: 0.93 10*3/MM3 (ref 0.7–3.1)
LYMPHOCYTES # BLD AUTO: 1.31 10*3/MM3 (ref 0.7–3.1)
LYMPHOCYTES # BLD AUTO: 1.8 10*3/MM3 (ref 0.7–3.1)
LYMPHOCYTES # BLD AUTO: 1.96 10*3/MM3 (ref 0.7–3.1)
LYMPHOCYTES # BLD AUTO: 2.01 10*3/MM3 (ref 0.7–3.1)
LYMPHOCYTES # BLD AUTO: 2.21 10*3/MM3 (ref 0.7–3.1)
LYMPHOCYTES # BLD AUTO: 2.3 X10E3/UL (ref 0.7–3.1)
LYMPHOCYTES # BLD AUTO: 2.65 10*3/MM3 (ref 0.7–3.1)
LYMPHOCYTES NFR BLD AUTO: 16.3 % (ref 19.6–45.3)
LYMPHOCYTES NFR BLD AUTO: 17.2 % (ref 19.6–45.3)
LYMPHOCYTES NFR BLD AUTO: 20.5 % (ref 19.6–45.3)
LYMPHOCYTES NFR BLD AUTO: 23.3 % (ref 19.6–45.3)
LYMPHOCYTES NFR BLD AUTO: 26.2 % (ref 19.6–45.3)
LYMPHOCYTES NFR BLD AUTO: 27.3 % (ref 19.6–45.3)
LYMPHOCYTES NFR BLD AUTO: 29.6 % (ref 19.6–45.3)
LYMPHOCYTES NFR BLD AUTO: 30 %
LYMPHOCYTES NFR BLD AUTO: 9.3 % (ref 19.6–45.3)
MCH RBC QN AUTO: 27.5 PG (ref 26.6–33)
MCH RBC QN AUTO: 27.9 PG (ref 26.6–33)
MCH RBC QN AUTO: 28.3 PG (ref 26.6–33)
MCH RBC QN AUTO: 28.4 PG (ref 26.6–33)
MCH RBC QN AUTO: 28.5 PG (ref 26.6–33)
MCH RBC QN AUTO: 28.6 PG (ref 26.6–33)
MCH RBC QN AUTO: 29 PG (ref 26.6–33)
MCH RBC QN AUTO: 29.3 PG (ref 26.6–33)
MCH RBC QN AUTO: 29.3 PG (ref 26.6–33)
MCHC RBC AUTO-ENTMCNC: 32 G/DL (ref 31.5–35.7)
MCHC RBC AUTO-ENTMCNC: 32.2 G/DL (ref 31.5–35.7)
MCHC RBC AUTO-ENTMCNC: 32.3 G/DL (ref 31.5–35.7)
MCHC RBC AUTO-ENTMCNC: 32.4 G/DL (ref 31.5–35.7)
MCHC RBC AUTO-ENTMCNC: 32.7 G/DL (ref 31.5–35.7)
MCHC RBC AUTO-ENTMCNC: 32.8 G/DL (ref 31.5–35.7)
MCHC RBC AUTO-ENTMCNC: 33.2 G/DL (ref 31.5–35.7)
MCHC RBC AUTO-ENTMCNC: 33.5 G/DL (ref 31.5–35.7)
MCHC RBC AUTO-ENTMCNC: 33.7 G/DL (ref 31.5–35.7)
MCV RBC AUTO: 85 FL (ref 79–97)
MCV RBC AUTO: 85.5 FL (ref 79–97)
MCV RBC AUTO: 85.8 FL (ref 79–97)
MCV RBC AUTO: 86.1 FL (ref 79–97)
MCV RBC AUTO: 87.4 FL (ref 79–97)
MCV RBC AUTO: 87.5 FL (ref 79–97)
MCV RBC AUTO: 88.4 FL (ref 79–97)
MCV RBC AUTO: 88.6 FL (ref 79–97)
MCV RBC AUTO: 89.4 FL (ref 79–97)
MONOCYTES # BLD AUTO: 0.77 10*3/MM3 (ref 0.1–0.9)
MONOCYTES # BLD AUTO: 0.8 X10E3/UL (ref 0.1–0.9)
MONOCYTES # BLD AUTO: 0.81 10*3/MM3 (ref 0.1–0.9)
MONOCYTES # BLD AUTO: 0.84 10*3/MM3 (ref 0.1–0.9)
MONOCYTES # BLD AUTO: 0.85 10*3/MM3 (ref 0.1–0.9)
MONOCYTES # BLD AUTO: 0.98 10*3/MM3 (ref 0.1–0.9)
MONOCYTES # BLD AUTO: 1 10*3/MM3 (ref 0.1–0.9)
MONOCYTES # BLD AUTO: 1.06 10*3/MM3 (ref 0.1–0.9)
MONOCYTES # BLD AUTO: 1.21 10*3/MM3 (ref 0.1–0.9)
MONOCYTES NFR BLD AUTO: 10.1 % (ref 5–12)
MONOCYTES NFR BLD AUTO: 10.2 % (ref 5–12)
MONOCYTES NFR BLD AUTO: 11 %
MONOCYTES NFR BLD AUTO: 11.1 % (ref 5–12)
MONOCYTES NFR BLD AUTO: 11.5 % (ref 5–12)
MONOCYTES NFR BLD AUTO: 13.5 % (ref 5–12)
MONOCYTES NFR BLD AUTO: 14.2 % (ref 5–12)
MONOCYTES NFR BLD AUTO: 14.7 % (ref 5–12)
MONOCYTES NFR BLD AUTO: 8.9 % (ref 5–12)
NEUTROPHILS # BLD AUTO: 3.81 10*3/MM3 (ref 1.7–7)
NEUTROPHILS # BLD AUTO: 4.1 X10E3/UL (ref 1.4–7)
NEUTROPHILS # BLD AUTO: 4.24 10*3/MM3 (ref 1.7–7)
NEUTROPHILS # BLD AUTO: 4.9 10*3/MM3 (ref 1.7–7)
NEUTROPHILS # BLD AUTO: 5.4 10*3/MM3 (ref 1.7–7)
NEUTROPHILS NFR BLD AUTO: 3.71 10*3/MM3 (ref 1.7–7)
NEUTROPHILS NFR BLD AUTO: 4.54 10*3/MM3 (ref 1.7–7)
NEUTROPHILS NFR BLD AUTO: 5.14 10*3/MM3 (ref 1.7–7)
NEUTROPHILS NFR BLD AUTO: 50.2 % (ref 42.7–76)
NEUTROPHILS NFR BLD AUTO: 50.6 % (ref 42.7–76)
NEUTROPHILS NFR BLD AUTO: 53 % (ref 42.7–76)
NEUTROPHILS NFR BLD AUTO: 54 %
NEUTROPHILS NFR BLD AUTO: 56.9 % (ref 42.7–76)
NEUTROPHILS NFR BLD AUTO: 62.5 % (ref 42.7–76)
NEUTROPHILS NFR BLD AUTO: 64.8 % (ref 42.7–76)
NEUTROPHILS NFR BLD AUTO: 67.6 % (ref 42.7–76)
NEUTROPHILS NFR BLD AUTO: 7.19 10*3/MM3 (ref 1.7–7)
NEUTROPHILS NFR BLD AUTO: 74.7 % (ref 42.7–76)
NRBC BLD AUTO-RTO: 0 /100 WBC (ref 0–0.2)
NRBC BLD AUTO-RTO: 0 /100 WBC (ref 0–0.2)
NSE SERPL IA-MCNC: 4.9 NG/ML (ref 0–12.5)
NSE SERPL IA-MCNC: 6.1 NG/ML (ref 0–12.5)
PLATELET # BLD AUTO: 124 10*3/MM3 (ref 140–450)
PLATELET # BLD AUTO: 140 10*3/MM3 (ref 140–450)
PLATELET # BLD AUTO: 151 10*3/MM3 (ref 140–450)
PLATELET # BLD AUTO: 155 10*3/MM3 (ref 140–450)
PLATELET # BLD AUTO: 155 10*3/MM3 (ref 140–450)
PLATELET # BLD AUTO: 157 10*3/MM3 (ref 140–450)
PLATELET # BLD AUTO: 168 10*3/MM3 (ref 140–450)
PLATELET # BLD AUTO: 189 X10E3/UL (ref 150–450)
PLATELET # BLD AUTO: 196 10*3/MM3 (ref 140–450)
PMV BLD AUTO: 10.1 FL (ref 6–12)
PMV BLD AUTO: 10.3 FL (ref 6–12)
PMV BLD AUTO: 10.4 FL (ref 6–12)
PMV BLD AUTO: 10.5 FL (ref 6–12)
PMV BLD AUTO: 10.7 FL (ref 6–12)
PMV BLD AUTO: 10.7 FL (ref 6–12)
PMV BLD AUTO: 11 FL (ref 6–12)
PMV BLD AUTO: 8.7 FL (ref 6–12)
POTASSIUM BLD-SCNC: 3.5 MMOL/L (ref 3.5–5.2)
POTASSIUM BLD-SCNC: 3.9 MMOL/L (ref 3.5–5.2)
POTASSIUM BLD-SCNC: 3.9 MMOL/L (ref 3.5–5.2)
POTASSIUM BLD-SCNC: 4.3 MMOL/L (ref 3.5–5.2)
POTASSIUM BLD-SCNC: 4.4 MMOL/L (ref 3.5–5.2)
POTASSIUM SERPL-SCNC: 3.6 MMOL/L (ref 3.5–5.2)
POTASSIUM SERPL-SCNC: 3.9 MMOL/L (ref 3.5–5.2)
POTASSIUM SERPL-SCNC: 3.9 MMOL/L (ref 3.5–5.2)
POTASSIUM SERPL-SCNC: 4.1 MMOL/L (ref 3.5–5.2)
PROT SERPL-MCNC: 6.4 G/DL (ref 6–8.5)
PROT SERPL-MCNC: 6.8 G/DL (ref 6–8.5)
PROT SERPL-MCNC: 6.8 G/DL (ref 6–8.5)
PROT SERPL-MCNC: 6.9 G/DL (ref 6–8.5)
PROT SERPL-MCNC: 7 G/DL (ref 6–8.5)
PROT SERPL-MCNC: 7.1 G/DL (ref 6–8.5)
PROT SERPL-MCNC: 7.1 G/DL (ref 6–8.5)
PROTHROMBIN TIME: 11.2 SECONDS (ref 9.6–11.7)
RBC # BLD AUTO: 4.33 10*6/MM3 (ref 3.77–5.28)
RBC # BLD AUTO: 4.54 10*6/MM3 (ref 3.77–5.28)
RBC # BLD AUTO: 4.58 10*6/MM3 (ref 3.77–5.28)
RBC # BLD AUTO: 4.61 10*6/MM3 (ref 3.77–5.28)
RBC # BLD AUTO: 4.63 10*6/MM3 (ref 3.77–5.28)
RBC # BLD AUTO: 4.89 10*6/MM3 (ref 3.77–5.28)
RBC # BLD AUTO: 4.91 X10E6/UL (ref 3.77–5.28)
RBC # BLD AUTO: 4.94 10*6/MM3 (ref 3.77–5.28)
RBC # BLD AUTO: 5.06 10*6/MM3 (ref 3.77–5.28)
SARS-COV-2 RNA RESP QL NAA+PROBE: NOT DETECTED
SODIUM BLD-SCNC: 140 MMOL/L (ref 136–145)
SODIUM BLD-SCNC: 140 MMOL/L (ref 136–145)
SODIUM BLD-SCNC: 142 MMOL/L (ref 136–145)
SODIUM SERPL-SCNC: 139 MMOL/L (ref 136–145)
SODIUM SERPL-SCNC: 140 MMOL/L (ref 136–145)
SODIUM SERPL-SCNC: 141 MMOL/L (ref 136–145)
SODIUM SERPL-SCNC: 141 MMOL/L (ref 136–145)
WBC # BLD AUTO: 5.72 10*3/MM3 (ref 3.4–10.8)
WBC # BLD AUTO: 7.6 10*3/MM3 (ref 3.4–10.8)
WBC # BLD AUTO: 7.6 X10E3/UL (ref 3.4–10.8)
WBC # BLD AUTO: 8.96 10*3/MM3 (ref 3.4–10.8)
WBC # BLD AUTO: 9.62 10*3/MM3 (ref 3.4–10.8)
WBC NRBC COR # BLD: 7.19 10*3/MM3 (ref 3.4–10.8)
WBC NRBC COR # BLD: 8.44 10*3/MM3 (ref 3.4–10.8)
WBC NRBC COR # BLD: 8.62 10*3/MM3 (ref 3.4–10.8)
WBC NRBC COR # BLD: 8.7 10*3/MM3 (ref 3.4–10.8)

## 2020-01-01 PROCEDURE — 36415 COLL VENOUS BLD VENIPUNCTURE: CPT

## 2020-01-01 PROCEDURE — 96402 CHEMO HORMON ANTINEOPL SQ/IM: CPT

## 2020-01-01 PROCEDURE — 99204 OFFICE O/P NEW MOD 45 MIN: CPT | Performed by: INTERNAL MEDICINE

## 2020-01-01 PROCEDURE — 93458 L HRT ARTERY/VENTRICLE ANGIO: CPT | Performed by: INTERNAL MEDICINE

## 2020-01-01 PROCEDURE — 99215 OFFICE O/P EST HI 40 MIN: CPT | Performed by: INTERNAL MEDICINE

## 2020-01-01 PROCEDURE — 25010000002 LANREOTIDE ACETATE 120 MG/0.5ML SOLUTION: Performed by: NURSE PRACTITIONER

## 2020-01-01 PROCEDURE — 99443 PR PHYS/QHP TELEPHONE EVALUATION 21-30 MIN: CPT | Performed by: INTERNAL MEDICINE

## 2020-01-01 PROCEDURE — 96372 THER/PROPH/DIAG INJ SC/IM: CPT

## 2020-01-01 PROCEDURE — 99205 OFFICE O/P NEW HI 60 MIN: CPT | Performed by: INTERNAL MEDICINE

## 2020-01-01 PROCEDURE — 99214 OFFICE O/P EST MOD 30 MIN: CPT | Performed by: INTERNAL MEDICINE

## 2020-01-01 PROCEDURE — C9803 HOPD COVID-19 SPEC COLLECT: HCPCS | Performed by: INTERNAL MEDICINE

## 2020-01-01 PROCEDURE — 36415 COLL VENOUS BLD VENIPUNCTURE: CPT | Performed by: INTERNAL MEDICINE

## 2020-01-01 PROCEDURE — 80053 COMPREHEN METABOLIC PANEL: CPT | Performed by: INTERNAL MEDICINE

## 2020-01-01 PROCEDURE — 85025 COMPLETE CBC W/AUTO DIFF WBC: CPT | Performed by: NURSE PRACTITIONER

## 2020-01-01 PROCEDURE — 85025 COMPLETE CBC W/AUTO DIFF WBC: CPT

## 2020-01-01 PROCEDURE — 77386: CPT | Performed by: RADIOLOGY

## 2020-01-01 PROCEDURE — 25010000002 LANREOTIDE ACETATE 120 MG/0.5ML SOLUTION: Performed by: INTERNAL MEDICINE

## 2020-01-01 PROCEDURE — 99213 OFFICE O/P EST LOW 20 MIN: CPT | Performed by: RADIOLOGY

## 2020-01-01 PROCEDURE — 85610 PROTHROMBIN TIME: CPT

## 2020-01-01 PROCEDURE — 85025 COMPLETE CBC W/AUTO DIFF WBC: CPT | Performed by: INTERNAL MEDICINE

## 2020-01-01 PROCEDURE — 0 IOPAMIDOL PER 1 ML: Performed by: RADIOLOGY

## 2020-01-01 PROCEDURE — 77300 RADIATION THERAPY DOSE PLAN: CPT | Performed by: RADIOLOGY

## 2020-01-01 PROCEDURE — 77301 RADIOTHERAPY DOSE PLAN IMRT: CPT | Performed by: RADIOLOGY

## 2020-01-01 PROCEDURE — 96401 CHEMO ANTI-NEOPL SQ/IM: CPT

## 2020-01-01 PROCEDURE — 77334 RADIATION TREATMENT AID(S): CPT | Performed by: RADIOLOGY

## 2020-01-01 PROCEDURE — 77293 RESPIRATOR MOTION MGMT SIMUL: CPT | Performed by: RADIOLOGY

## 2020-01-01 PROCEDURE — 25010000002 FENTANYL CITRATE (PF) 100 MCG/2ML SOLUTION: Performed by: INTERNAL MEDICINE

## 2020-01-01 PROCEDURE — 80048 BASIC METABOLIC PNL TOTAL CA: CPT | Performed by: NURSE PRACTITIONER

## 2020-01-01 PROCEDURE — 25010000002 HEPARIN (PORCINE) PER 1000 UNITS: Performed by: INTERNAL MEDICINE

## 2020-01-01 PROCEDURE — 71046 X-RAY EXAM CHEST 2 VIEWS: CPT

## 2020-01-01 PROCEDURE — 82378 CARCINOEMBRYONIC ANTIGEN: CPT | Performed by: INTERNAL MEDICINE

## 2020-01-01 PROCEDURE — 25010000002 MIDAZOLAM PER 1 MG: Performed by: INTERNAL MEDICINE

## 2020-01-01 PROCEDURE — 82962 GLUCOSE BLOOD TEST: CPT

## 2020-01-01 PROCEDURE — 80048 BASIC METABOLIC PNL TOTAL CA: CPT

## 2020-01-01 PROCEDURE — 82565 ASSAY OF CREATININE: CPT

## 2020-01-01 PROCEDURE — U0004 COV-19 TEST NON-CDC HGH THRU: HCPCS | Performed by: INTERNAL MEDICINE

## 2020-01-01 PROCEDURE — 99152 MOD SED SAME PHYS/QHP 5/>YRS: CPT | Performed by: INTERNAL MEDICINE

## 2020-01-01 PROCEDURE — 77336 RADIATION PHYSICS CONSULT: CPT | Performed by: RADIOLOGY

## 2020-01-01 PROCEDURE — C1894 INTRO/SHEATH, NON-LASER: HCPCS | Performed by: INTERNAL MEDICINE

## 2020-01-01 PROCEDURE — 71260 CT THORAX DX C+: CPT

## 2020-01-01 PROCEDURE — 80053 COMPREHEN METABOLIC PANEL: CPT | Performed by: NURSE PRACTITIONER

## 2020-01-01 PROCEDURE — 74177 CT ABD & PELVIS W/CONTRAST: CPT

## 2020-01-01 PROCEDURE — 0 IOPAMIDOL PER 1 ML: Performed by: INTERNAL MEDICINE

## 2020-01-01 PROCEDURE — 93000 ELECTROCARDIOGRAM COMPLETE: CPT | Performed by: INTERNAL MEDICINE

## 2020-01-01 PROCEDURE — G0463 HOSPITAL OUTPT CLINIC VISIT: HCPCS | Performed by: RADIOLOGY

## 2020-01-01 PROCEDURE — 77338 DESIGN MLC DEVICE FOR IMRT: CPT | Performed by: RADIOLOGY

## 2020-01-01 PROCEDURE — 99153 MOD SED SAME PHYS/QHP EA: CPT | Performed by: INTERNAL MEDICINE

## 2020-01-01 PROCEDURE — C1769 GUIDE WIRE: HCPCS | Performed by: INTERNAL MEDICINE

## 2020-01-01 RX ORDER — ACETAMINOPHEN 325 MG/1
650 TABLET ORAL EVERY 4 HOURS PRN
Status: DISCONTINUED | OUTPATIENT
Start: 2020-01-01 | End: 2020-01-01 | Stop reason: HOSPADM

## 2020-01-01 RX ORDER — ALBUTEROL SULFATE 90 UG/1
2 AEROSOL, METERED RESPIRATORY (INHALATION) EVERY 4 HOURS PRN
COMMUNITY
Start: 2020-01-01

## 2020-01-01 RX ORDER — ISOSORBIDE MONONITRATE 30 MG/1
30 TABLET, EXTENDED RELEASE ORAL DAILY
Qty: 30 TABLET | Refills: 11 | Status: SHIPPED | OUTPATIENT
Start: 2020-01-01 | End: 2020-01-01

## 2020-01-01 RX ORDER — LANREOTIDE ACETATE 120 MG/.5ML
120 INJECTION SUBCUTANEOUS ONCE
Status: CANCELLED | OUTPATIENT
Start: 2020-01-01

## 2020-01-01 RX ORDER — OXYCODONE AND ACETAMINOPHEN 10; 325 MG/1; MG/1
1 TABLET ORAL EVERY 4 HOURS PRN
COMMUNITY
Start: 2020-01-01

## 2020-01-01 RX ORDER — LANREOTIDE ACETATE 120 MG/.5ML
120 INJECTION SUBCUTANEOUS ONCE
Status: DISCONTINUED | OUTPATIENT
Start: 2020-01-01 | End: 2020-01-01

## 2020-01-01 RX ORDER — SODIUM CHLORIDE 9 MG/ML
30 INJECTION, SOLUTION INTRAVENOUS CONTINUOUS
Status: DISCONTINUED | OUTPATIENT
Start: 2020-01-01 | End: 2020-01-01 | Stop reason: HOSPADM

## 2020-01-01 RX ORDER — LANREOTIDE ACETATE 120 MG/.5ML
120 INJECTION SUBCUTANEOUS ONCE
Status: COMPLETED | OUTPATIENT
Start: 2020-01-01 | End: 2020-01-01

## 2020-01-01 RX ORDER — BUDESONIDE AND FORMOTEROL FUMARATE DIHYDRATE 80; 4.5 UG/1; UG/1
2 AEROSOL RESPIRATORY (INHALATION)
Qty: 6.9 G | Refills: 12 | Status: SHIPPED | OUTPATIENT
Start: 2020-01-01

## 2020-01-01 RX ORDER — NITROGLYCERIN 5 MG/ML
INJECTION, SOLUTION INTRAVENOUS AS NEEDED
Status: DISCONTINUED | OUTPATIENT
Start: 2020-01-01 | End: 2020-01-01 | Stop reason: HOSPADM

## 2020-01-01 RX ORDER — AMITRIPTYLINE HYDROCHLORIDE 25 MG/1
25 TABLET, FILM COATED ORAL
COMMUNITY
Start: 2020-02-01

## 2020-01-01 RX ORDER — ROSUVASTATIN CALCIUM 10 MG/1
10 TABLET, COATED ORAL DAILY
COMMUNITY
Start: 2020-01-01

## 2020-01-01 RX ORDER — AZITHROMYCIN 250 MG/1
TABLET, FILM COATED ORAL
Qty: 1 TABLET | Refills: 0 | Status: SHIPPED | OUTPATIENT
Start: 2020-01-01 | End: 2020-01-01

## 2020-01-01 RX ORDER — FENTANYL CITRATE 50 UG/ML
INJECTION, SOLUTION INTRAMUSCULAR; INTRAVENOUS AS NEEDED
Status: DISCONTINUED | OUTPATIENT
Start: 2020-01-01 | End: 2020-01-01 | Stop reason: HOSPADM

## 2020-01-01 RX ORDER — SUCRALFATE 1 G/1
1 TABLET ORAL 4 TIMES DAILY
COMMUNITY
Start: 2019-12-30

## 2020-01-01 RX ORDER — AMOXICILLIN 500 MG/1
1000 CAPSULE ORAL 2 TIMES DAILY
COMMUNITY
End: 2020-01-01

## 2020-01-01 RX ORDER — ONDANSETRON 4 MG/1
4 TABLET, FILM COATED ORAL EVERY 6 HOURS PRN
Status: DISCONTINUED | OUTPATIENT
Start: 2020-01-01 | End: 2020-01-01 | Stop reason: HOSPADM

## 2020-01-01 RX ORDER — BACLOFEN 20 MG/1
40 TABLET ORAL DAILY
COMMUNITY
Start: 2020-01-29

## 2020-01-01 RX ORDER — ONDANSETRON 2 MG/ML
4 INJECTION INTRAMUSCULAR; INTRAVENOUS EVERY 6 HOURS PRN
Status: DISCONTINUED | OUTPATIENT
Start: 2020-01-01 | End: 2020-01-01 | Stop reason: HOSPADM

## 2020-01-01 RX ORDER — POTASSIUM CHLORIDE 20 MEQ/1
20 TABLET, EXTENDED RELEASE ORAL DAILY
COMMUNITY
Start: 2020-01-01

## 2020-01-01 RX ORDER — MIDAZOLAM HYDROCHLORIDE 1 MG/ML
INJECTION INTRAMUSCULAR; INTRAVENOUS AS NEEDED
Status: DISCONTINUED | OUTPATIENT
Start: 2020-01-01 | End: 2020-01-01 | Stop reason: HOSPADM

## 2020-01-01 RX ORDER — LISINOPRIL AND HYDROCHLOROTHIAZIDE 12.5; 1 MG/1; MG/1
1 TABLET ORAL DAILY
COMMUNITY
Start: 2020-01-01

## 2020-01-01 RX ORDER — NICARDIPINE HYDROCHLORIDE 2.5 MG/ML
INJECTION INTRAVENOUS AS NEEDED
Status: DISCONTINUED | OUTPATIENT
Start: 2020-01-01 | End: 2020-01-01 | Stop reason: HOSPADM

## 2020-01-01 RX ORDER — TEMAZEPAM 7.5 MG/1
30 CAPSULE ORAL NIGHTLY PRN
Qty: 30 CAPSULE | Refills: 3 | Status: SHIPPED | OUTPATIENT
Start: 2020-01-01 | End: 2020-01-01

## 2020-01-01 RX ORDER — LIDOCAINE HYDROCHLORIDE 20 MG/ML
INJECTION, SOLUTION INFILTRATION; PERINEURAL AS NEEDED
Status: DISCONTINUED | OUTPATIENT
Start: 2020-01-01 | End: 2020-01-01 | Stop reason: HOSPADM

## 2020-01-01 RX ORDER — AMOXICILLIN AND CLAVULANATE POTASSIUM 875; 125 MG/1; MG/1
1 TABLET, FILM COATED ORAL 2 TIMES DAILY
Qty: 20 TABLET | Refills: 0 | Status: SHIPPED | OUTPATIENT
Start: 2020-01-01 | End: 2020-01-01

## 2020-01-01 RX ORDER — MELOXICAM 7.5 MG/1
7.5 TABLET ORAL DAILY
COMMUNITY
Start: 2020-02-01

## 2020-01-01 RX ORDER — CLOPIDOGREL BISULFATE 75 MG/1
75 TABLET ORAL DAILY
COMMUNITY
Start: 2019-12-30

## 2020-01-01 RX ORDER — DIPHENHYDRAMINE HCL 25 MG
25 CAPSULE ORAL EVERY 6 HOURS PRN
Status: DISCONTINUED | OUTPATIENT
Start: 2020-01-01 | End: 2020-01-01 | Stop reason: HOSPADM

## 2020-01-01 RX ORDER — SITAGLIPTIN AND METFORMIN HYDROCHLORIDE 1000; 50 MG/1; MG/1
1 TABLET, FILM COATED, EXTENDED RELEASE ORAL DAILY
COMMUNITY
Start: 2020-01-29

## 2020-01-01 RX ORDER — HEPARIN SODIUM 1000 [USP'U]/ML
INJECTION, SOLUTION INTRAVENOUS; SUBCUTANEOUS AS NEEDED
Status: DISCONTINUED | OUTPATIENT
Start: 2020-01-01 | End: 2020-01-01 | Stop reason: HOSPADM

## 2020-01-01 RX ORDER — AMOXICILLIN 500 MG/1
CAPSULE ORAL
COMMUNITY
Start: 2020-01-01 | End: 2020-01-01 | Stop reason: SDUPTHER

## 2020-01-01 RX ORDER — GLIPIZIDE 5 MG/1
5 TABLET ORAL DAILY
COMMUNITY
Start: 2020-01-29

## 2020-01-01 RX ADMIN — SODIUM CHLORIDE 30 ML/HR: 9 INJECTION, SOLUTION INTRAVENOUS at 09:05

## 2020-01-01 RX ADMIN — LANREOTIDE ACETATE 120 MG: 120 INJECTION SUBCUTANEOUS at 11:27

## 2020-01-01 RX ADMIN — LANREOTIDE ACETATE 120 MG: 120 INJECTION SUBCUTANEOUS at 11:33

## 2020-01-01 RX ADMIN — LANREOTIDE ACETATE 120 MG: 120 INJECTION SUBCUTANEOUS at 14:32

## 2020-01-01 RX ADMIN — LANREOTIDE ACETATE 120 MG: 120 INJECTION SUBCUTANEOUS at 15:19

## 2020-01-01 RX ADMIN — LANREOTIDE ACETATE 120 MG: 120 INJECTION SUBCUTANEOUS at 11:30

## 2020-01-01 RX ADMIN — IOPAMIDOL 100 ML: 755 INJECTION, SOLUTION INTRAVENOUS at 14:00

## 2020-01-01 RX ADMIN — LANREOTIDE ACETATE 120 MG: 120 INJECTION SUBCUTANEOUS at 11:55

## 2020-03-19 PROBLEM — C7B.8 NEUROENDOCRINE CARCINOMA METASTATIC TO MULTIPLE SITES (HCC): Status: ACTIVE | Noted: 2020-01-01

## 2020-03-19 PROBLEM — C7A.8 NEUROENDOCRINE CARCINOMA METASTATIC TO MULTIPLE SITES (HCC): Status: ACTIVE | Noted: 2020-01-01

## 2020-03-19 NOTE — PROGRESS NOTES
Hematology/Oncology Outpatient Consultation    Patient name: Christiane Heck  : 1950  MRN: [id number]  Primary Care Physician: Gloria Barron APRN  Referring Physician: Gloria Barron APRN  Reason For Consult:   Pancreatic neuroendocrine tumor with metastatic disease to the lung  History of Present Illness:  · Ms. Heck was diagnosed with pulmonary nodules dominant on the left side in .  · 2017 patient underwent PET CT scan which showed dominant lingular mass that was seen on the prior chest CT on 2018 which was hypermetabolic consistent with malignancy.  · 2018 left-sided lung nodule was biopsied and was positive for synaptophysin and focally positive for chromogranin.  · 2/15/2018 PET/CT [facility] revealed focal gallium-68 dotatate uptake within the mid body of the pancreas and bilateral pulmonary nodules including a 3.5 cm left lower lobe more consistent with metastatic disease.  · 2018 patient was initiated on lanreotide monthly at the [facility] under the care of Dr. Ivis Manrique  · Patient has been noncompliant with her appointments secondary to travel issues and social issues and patient was sent to the [facility] cancer care center as a transfer from [facility] and seen initially on 3/19/2020.  · 2020 patient underwent PET CT scan for follow-up of CT scan of the chest that was done at [facility].  PET/CT was done at the [facility] which revealed an nodule in the thyroid gland in the left lobe with an SUV of 2.3 of 2 x 1.5 x 2.5 cm.  In the chest and the right upper lobe pulmonary nodule 1.2 x 1 x 1.5 cm with activity of 1.97.  There are multiple additional tiny and small pulmonary metastatic lesions scattered elsewhere throughout the right lung.  There are areas of non-cavitary pulmonary nodularity identified in the lingula and the left lower lobe are also mild and moderately hypermetabolic with a maximum SUV of 6 which  corresponds to the 4.25 x 3.5 x 4 cm pulmonary mass.  There are other tiny non-cavitary lesions in the left upper lobe.  Enlarged mildly metabolic adenopathy left side of the mediastinum 1.75 x 1.75 x 1.5 cm with SUV of 4.42.  Metastatic lymph node at the left.  Pulmonary ligament within a severe 5.83 and size of 5.75 x 4.5 x 7 cm.  Stable metastatic adenopathy in the subcarinal distribution 3.25 x 2.25 x 2.5 cm with an SUV of 3.29.  Abdomen and pelvis scan was benign.          Past Medical History:   Diagnosis Date   • CAD (coronary artery disease)    • GERD (gastroesophageal reflux disease)    • HLD (hyperlipidemia)    • Hypertension    • Muscle ache     Muscle ache/cramps   • Neuropathy    • Spinal stenosis     Chronic back pain       Past Surgical History:   Procedure Laterality Date   • CORONARY STENT PLACEMENT           Current Outpatient Medications:   •  amitriptyline (ELAVIL) 25 MG tablet, Take 25 mg by mouth every night at bedtime., Disp: , Rfl:   •  baclofen (LIORESAL) 20 MG tablet, Take 40 mg by mouth Daily., Disp: , Rfl:   •  clopidogrel (PLAVIX) 75 MG tablet, Take 75 mg by mouth Daily., Disp: , Rfl:   •  glipizide (GLUCOTROL) 5 MG tablet, Take 5 mg by mouth Daily., Disp: , Rfl:   •  JANUMET XR  MG tablet, Take 1 tablet by mouth Daily., Disp: , Rfl:   •  lisinopril-hydrochlorothiazide (PRINZIDE,ZESTORETIC) 10-12.5 MG per tablet, , Disp: , Rfl:   •  meloxicam (MOBIC) 7.5 MG tablet, Take 7.5 mg by mouth Daily., Disp: , Rfl:   •  oxyCODONE-acetaminophen (PERCOCET)  MG per tablet, TAKE ONE TABLET BY MOUTH EVERY 4 TO 6 HOURS AS NEEDED, Disp: , Rfl:   •  rosuvastatin (CRESTOR) 10 MG tablet, , Disp: , Rfl:   •  sucralfate (CARAFATE) 1 g tablet, Take 1 g by mouth 4 (Four) Times a Day., Disp: , Rfl:     No Known Allergies      There is no immunization history on file for this patient.    Family History   Problem Relation Age of Onset   • Lung cancer Mother    • Lung cancer Sister   "      Cancer-related family history includes Lung cancer in her mother and sister.    Social History     Tobacco Use   • Smoking status: Never Smoker   • Smokeless tobacco: Never Used   Substance Use Topics   • Alcohol use: Never     Frequency: Never   • Drug use: Never     Subjective:  Patient is my office for initial visit.  Reports her fatigue and tiredness.  She takes care of 3 grand children.  Is short of air on exertion and reports to the chest pain across her front of the chest for which she takes Percocet which helps her pain.  Also reports to pain in the muscles in her legs.  Denies diarrhea has constipation and uses stool softeners.  ROS:    Review of Systems   Constitutional: Negative for fever.   HENT: Negative for nosebleeds and trouble swallowing.    Eyes: Negative for visual disturbance.   Respiratory: Negative for cough, shortness of breath and wheezing.    Cardiovascular: Negative for chest pain.   Gastrointestinal: Negative for abdominal pain and blood in stool.   Endocrine: Negative for cold intolerance.   Genitourinary: Negative for dysuria and hematuria.   Musculoskeletal: Negative for joint swelling.   Skin: Negative for rash.   Allergic/Immunologic: Negative for environmental allergies.   Neurological: Negative for seizures.   Hematological: Does not bruise/bleed easily.   Psychiatric/Behavioral: The patient is not nervous/anxious.        Objective:    Vitals:    03/19/20 1033   BP: 116/73   Pulse: 102   Temp: 98.2 °F (36.8 °C)   Weight: 95.3 kg (210 lb)   Height: 154.9 cm (61\")     Body mass index is 39.68 kg/m².      Physical Exam:  Physical Exam   Constitutional: She is oriented to person, place, and time. No distress.   Moderately built morbidly obese   HENT:   Head: Normocephalic and atraumatic.   Eyes: Conjunctivae and EOM are normal. Right eye exhibits no discharge. Left eye exhibits no discharge. No scleral icterus.   Neck: Normal range of motion. Neck supple. No thyromegaly present. "   Palpable 1 cm thyroid nodule in the left lobe   Cardiovascular: Normal rate, regular rhythm and normal heart sounds. Exam reveals no gallop and no friction rub.   Pulmonary/Chest: Effort normal. No stridor. No respiratory distress. She has no wheezes.   Bibasal crackles   Abdominal: Soft. Bowel sounds are normal. She exhibits no mass. There is no tenderness. There is no rebound and no guarding.   Soft distended no palpable organomegaly   Musculoskeletal: Normal range of motion. She exhibits no tenderness.   2+ bilateral lower extremity edema   Lymphadenopathy:     She has no cervical adenopathy.   Neurological: She is alert and oriented to person, place, and time. She exhibits normal muscle tone.   Skin: Skin is warm. No rash noted. She is not diaphoretic. No erythema.   Psychiatric: She has a normal mood and affect. Her behavior is normal.   Nursing note and vitals reviewed.      RECENT LABS  No results found for: WBC, RBC, HGB, HCT, MCV, MCH, MCHC, RDW, RDWSD, MPV, PLT, NEUTRORELPCT, LYMPHORELPCT, MONORELPCT, EOSRELPCT, BASORELPCT, AUTOIGPER, NEUTROABS, LYMPHSABS, MONOSABS, EOSABS, BASOSABS, AUTOIGNUM, NRBC    No results found for: GLUCOSE, BUN, CREATININE, EGFRIFNONA, EGFRIFAFRI, BCR, K, CO2, CALCIUM, PROTENTOTREF, ALBUMIN, LABIL2, BILIRUBIN, AST, ALT      Assessment/Plan   Primary malignant neuroendocrine tumor of pancreas (CMS/HCC)  - 5 HIAA, Urine, Quantitative, 24 Hour - Urine, Clean Catch  - Comprehensive Metabolic Panel  - CBC & Differential  - Neuron-Specific Enolase  - Comprehensive Metabolic Panel  - CBC & Differential  - Neuron-Specific Enolase  - CBC Auto Differential    Assessment:  1. Metastatic pancreatic neuroendocrine tumor  2. Lung metastatic disease  3. Thyroid nodule  4. Coronary artery disease and CHF  5. Type 2 diabetes  6. Medical noncompliance  7. ECOG 2    Plan:  1. Reviewed extensive path report, treatment and work-up that was obtained at the [facility].  Patient recent PET CT  scan showed progression of disease.  At this time I will put her back on the chemotherapy with lanreotide.  It is 120 mcg that will be administered every 4 weeks.   2. Today we will check CMP and a CEA and also check a urine for 24 hours for 5-HIAA.   3.  Thyroid nodule noted on last PET CT scan and ultrasound.  Biopsy was negative in December 2018  4. Discussed with her about compliance with the diuretics lack of which is causing fluid overload  5. Discussed with her about blood sugar control check hemoglobin A1c.  6. Had medical noncompliance mainly related to transportation.  Our office will contact [facility] for transportation for her treatments.  7. Lanreotide will be initiated next week  8. I will see her back in my office in 4 weeks with prior CBC and CMP.      I have reviewed labs results, imaging, vitals, and medications with the patient today. Patient verbalized understanding and is in agreement of the above plan.  Electronically signed by Brandon Arroyo MD, 03/19/20, 1:39 PM.

## 2020-03-23 NOTE — TELEPHONE ENCOUNTER
Informed patient of Kenisha Griffiths NP orders from 3-.  She states she is currently taking calcium supplements but will stop them today.  She wants Lab appt to be scheduled in [place].  Ashely Bronson to scheduled BMP appt.

## 2020-03-23 NOTE — TELEPHONE ENCOUNTER
----- Message from DWIGHT Tuttle sent at 3/23/2020  8:58 AM EDT -----  Kenisha JUÁREZ/Ian -     Please call patient and advise:     Increase PO fluid intake to 2-3L H2o daily   Stop any calcium tablets - I did not see any listed on medication list but check with patient.   Re check BMP in one week    Electronically signed by DWIGHT Tuttle, 03/23/20, 8:57 AM.

## 2020-04-01 NOTE — TELEPHONE ENCOUNTER
Called patient and gave her the appointment for her Somatuline injection on 4/8/20 @ 11:00.     I went over patients calendar with her again. She is aware that the injection is in [place] and that it will be monthly. Also that her appointments for lab 4/15, md blackwell/u 4/16 in the [place] office.    Her son stated that he is off on Wednesdays so he will take her to her appointments in [place] as long as they are on Wednesday. msd

## 2020-04-07 NOTE — TELEPHONE ENCOUNTER
Case Management/ Note    Patient Name: Christiane Heck  YOB: 1950  MRN #: [id number]    OSW called patient who is alert and oriented to person, place and time. Basic needs are met. Patient is aware that OSW is working from home and has the office number to call should they have any psychosocial needs. OSW will remain available.     Electronically signed by:   Cathy Schwartz LCSW, OSW-C  04/07/20, 11:40 AM

## 2020-04-09 NOTE — TELEPHONE ENCOUNTER
Case Management/ Note    Patient Name: Christiane Heck  YOB: 1950  MRN #: [id number]    OSW called patient at the request of LEA Escobedo. Patient is alert and oriented to person, place and time. She spoke about raising her three grandchildren, Juliet 15, Kenya 11,  and Tracy, 10. She has had temporary custody of them for the past two years. She said her daughter, Fredi was on drugs and lost custody of the children. She has been in rehab, is clean and has a job. She helps with buying the children clothes. Juliet's father has been in and out of residential and sees Juliet on occasion, he has never financially supported her. The other two children's father resides in [place] and has never paid child support, he does not see the children. She said the home has basic needs met. There are utilities, the children are attending school (she is paying $70 / month for internet services since they have been out of school due to covid-19). She said she meets their medical needs. She receives $260 in TANF for the three children and SNAP.     We discussed her concerns about where the children would go should she die. The conversation was candid in explaining that the children will go to either one of their parents first, provided they are able to meet minimum standard needs. She verbalized not liking this but gave v/u. Supportive care provided as this was a difficult conversation for her to hear. Gave suggestions for added supports to help her daughter should she need to take custody of them. This included her contacting [employer] to request parent aid skills, explaining that at this time there no need for OSW to call [employer]. Also, suggested the use of natural supports and she was able to give names of relatives and friends who are willing to help. She was encouraged to speak candidly to her daughter and natural supports to aid in setting up a good network for the children. She gave v/u.     We spoke about transportation  and she said she is not able to get transportation with [employer] even when she schedules them ahead of time. She has the [employer] w[employer] and is getting services for attendant care hours. Her son has agreed to take her as well. [employer] contacted and left a message for the  to confirm this.     She complained of chest pain that she said she has had and it is due to the cancer pushing up against her heart. She also complains of breast pain that started today and said her breast is tender to the touch, she is unable to wear a bra. A note was sent to LEA Escobedo.     She was given the number to [employer] for a mika related to covid-19. She called back later and said she called this number. She adds that she has urinary incontinence, especially when waking in the morning and needs urinary pads. Sent a note to [facility] to give patient urinary pads, if we have any in our donation supply. OSW will remain available.     OSW received a call from patient's , Caridad who said they recently changed companies so that she could have attendant care hours and this should start soon. OSW will remain available.     Electronically signed by:   Cathy Schwartz LCSW, OSW-C  04/09/20, 11:59 AM

## 2020-04-10 NOTE — TELEPHONE ENCOUNTER
"Case Management/ Note    Patient Name: Christiane Heck  YOB: 1950  MRN #: [id number]    OSW received a call from patient who is alert and oriented to person, place and time. She sounds as if she is tearful. She said her daughter, Fredi came to the house last night, and spent the night. In the middle of the night, she left but returned before morning. This morning, Christiane said, \"She was terrible mean to me\" She said Fredi told her, \"You are a big fat [id number]$%\" She said Fredi told her that there was nothing wrong with her, which was what upset her the most. She wanted OSW to promise that this conversation would be confidential; she was told that this could not be guaranteed. Christiane feels Fredi went out last night and did drugs and she is concerned about this. She said the grandchildren were safe and did not hear any of this conversation. Supportive care provided. OSW has contacted [facility] and has a message placed to see if they do preventative care for families. OSW will remain available.     Electronically signed by:   Cathy Schwartz LCSW, OSW-C  04/10/20, 1:27 PM        "

## 2020-04-13 NOTE — TELEPHONE ENCOUNTER
Case Management/ Note    Patient Name: Christiane Heck  YOB: 1950  MRN #: [id number]    OSW received a call back from Barbara at [facility] who recommended hotline be called as she is not aware of any preventative services that [facility] may have. OSW called [facility] hotline and spoke with Billie (report # [id number]) giving an account of last two phone calls. She is not aware of any preventative services but will recommend an assessement for the family.     Electronically signed by:   Cathy Schwartz LCSW, OSW-C  04/13/20, 2:56 PM

## 2020-04-16 NOTE — PROGRESS NOTES
Hematology/Oncology Outpatient Follow Up  You have chosen to receive care through a telephone visit. Do you consent to use a telephone visit for your medical care today?  Yes  Christiane Heck  1950    Primary Care Physician: Gloria Barron APRN  Referring Physician: Gloria Barron APRN  Chief Complaint:   Pancreatic neuroendocrine tumor with metastatic disease to the lung  History of Present Illness:     · Ms. Heck was diagnosed with pulmonary nodules dominant on the left side in 2015.  · December 2017 patient underwent PET CT scan which showed dominant lingular mass that was seen on the prior chest CT on January 8, 2018 which was hypermetabolic consistent with malignancy.  · January 2018 left-sided lung nodule was biopsied and was positive for synaptophysin and focally positive for chromogranin.  · 2/15/2018 PET/CT [facility] revealed focal gallium-68 dotatate uptake within the mid body of the pancreas and bilateral pulmonary nodules including a 3.5 cm left lower lobe more consistent with metastatic disease.  · 2/20/2018 patient was initiated on lanreotide monthly at the [facility] under the care of Dr. Ivis Manrique  · Patient has been noncompliant with her appointments secondary to travel issues and social issues and patient was sent to the [facility] as a transfer from [facility] and seen initially on 3/19/2020.  · 1/31/2020 patient underwent PET CT scan for follow-up of CT scan of the chest that was done at [facility].  PET/CT was done at the [facility] which revealed an nodule in the thyroid gland in the left lobe with an SUV of 2.3 of 2 x 1.5 x 2.5 cm.  In the chest and the right upper lobe pulmonary nodule 1.2 x 1 x 1.5 cm with activity of 1.97.  There are multiple additional tiny and small pulmonary metastatic lesions scattered elsewhere throughout the right lung.  There are areas of non-cavitary pulmonary nodularity identified in the lingula  and the left lower lobe are also mild and moderately hypermetabolic with a maximum SUV of 6 which corresponds to the 4.25 x 3.5 x 4 cm pulmonary mass.  There are other tiny non-cavitary lesions in the left upper lobe.  Enlarged mildly metabolic adenopathy left side of the mediastinum 1.75 x 1.75 x 1.5 cm with SUV of 4.42.  Metastatic lymph node at the left.  Pulmonary ligament within a severe 5.83 and size of 5.75 x 4.5 x 7 cm.  Stable metastatic adenopathy in the subcarinal distribution 3.25 x 2.25 x 2.5 cm with an SUV of 3.29.  Abdomen and pelvis scan was benign.  · 4/8/20/2020-patient reinitiated on lanreotide 120 mg to be given every 4 weeks    Past Medical History:   Diagnosis Date   • CAD (coronary artery disease)    • GERD (gastroesophageal reflux disease)    • HLD (hyperlipidemia)    • Hypertension    • Muscle ache     Muscle ache/cramps   • Neuropathy    • Spinal stenosis     Chronic back pain       Past Surgical History:   Procedure Laterality Date   • CORONARY STENT PLACEMENT           Current Outpatient Medications:   •  amitriptyline (ELAVIL) 25 MG tablet, Take 25 mg by mouth every night at bedtime., Disp: , Rfl:   •  amoxicillin (AMOXIL) 500 MG capsule, TAKE ONE CAPSULE BY MOUTH THREE TIMES DAILY FOR 10 DAYS, Disp: , Rfl:   •  baclofen (LIORESAL) 20 MG tablet, Take 40 mg by mouth Daily., Disp: , Rfl:   •  clopidogrel (PLAVIX) 75 MG tablet, Take 75 mg by mouth Daily., Disp: , Rfl:   •  glipizide (GLUCOTROL) 5 MG tablet, Take 5 mg by mouth Daily., Disp: , Rfl:   •  JANUMET XR  MG tablet, Take 1 tablet by mouth Daily., Disp: , Rfl:   •  lisinopril-hydrochlorothiazide (PRINZIDE,ZESTORETIC) 10-12.5 MG per tablet, , Disp: , Rfl:   •  meloxicam (MOBIC) 7.5 MG tablet, Take 7.5 mg by mouth Daily., Disp: , Rfl:   •  oxyCODONE-acetaminophen (PERCOCET)  MG per tablet, TAKE ONE TABLET BY MOUTH EVERY 4 TO 6 HOURS AS NEEDED, Disp: , Rfl:   •  rosuvastatin (CRESTOR) 10 MG tablet, , Disp: , Rfl:   •   sucralfate (CARAFATE) 1 g tablet, Take 1 g by mouth 4 (Four) Times a Day., Disp: , Rfl:   •  azithromycin (ZITHROMAX) 250 MG tablet, Take 2 tablets the first day, then 1 tablet daily for 4 days., Disp: 1 tablet, Rfl: 0    No Known Allergies    Family History   Problem Relation Age of Onset   • Lung cancer Mother    • Lung cancer Sister        Cancer-related family history includes Lung cancer in her mother and sister.    Social History     Tobacco Use   • Smoking status: Never Smoker   • Smokeless tobacco: Never Used   Substance Use Topics   • Alcohol use: Never     Frequency: Never   • Drug use: Never       I have reviewed the history of present illness, past medical history, family history, social history, lab results, all notes and other records since the patient was last seen at the cancer care center.      SUBJECTIVE:      Discussed with the patient on the telephone visit.  Very distraught about pain in her left side of the chest.  Denies pain when she takes a deep breath.  She is very emotional as she always sees.  Reports to being stressed out at home.  Has neuropathy symptoms she is taking the oxycodone which is helping with her pain diarrhea is controlled anxiety persists.  Did not lose weight.    ROS:      Review of Systems   Constitutional: Negative for fever.   HENT: Negative for nosebleeds and trouble swallowing.    Eyes: Negative for visual disturbance.   Respiratory: Negative for cough, shortness of breath and wheezing.    Cardiovascular: Negative for chest pain.   Gastrointestinal: Negative for abdominal pain and blood in stool.   Endocrine: Negative for cold intolerance.   Genitourinary: Negative for dysuria and hematuria.   Musculoskeletal: Negative for joint swelling.   Skin: Negative for rash.   Allergic/Immunologic: Negative for environmental allergies.   Neurological: Negative for seizures.   Hematological: Does not bruise/bleed easily.   Psychiatric/Behavioral: The patient is not  nervous/anxious.      MD performed ROS and are negative except as mentioned in Subjective.    Objective:       There were no vitals filed for this visit.      PHYSICAL EXAM:      Physical Exam   Well-built obese  RECENT LABS:     WBC   Date Value Ref Range Status   04/15/2020 7.6 3.4 - 10.8 x10E3/uL Final     RBC   Date Value Ref Range Status   04/15/2020 4.91 3.77 - 5.28 x10E6/uL Final     Hemoglobin   Date Value Ref Range Status   04/15/2020 13.7 11.1 - 15.9 g/dL Final   04/08/2020 13.2 12.0 - 15.9 g/dL Final     Hematocrit   Date Value Ref Range Status   04/15/2020 41.9 34.0 - 46.6 % Final   04/08/2020 41.0 34.0 - 46.6 % Final     MCV   Date Value Ref Range Status   04/15/2020 85 79 - 97 fL Final   04/08/2020 88.6 79.0 - 97.0 fL Final     MCH   Date Value Ref Range Status   04/15/2020 27.9 26.6 - 33.0 pg Final   04/08/2020 28.5 26.6 - 33.0 pg Final     MCHC   Date Value Ref Range Status   04/15/2020 32.7 31.5 - 35.7 g/dL Final   04/08/2020 32.2 31.5 - 35.7 g/dL Final     RDW   Date Value Ref Range Status   04/15/2020 13.1 11.7 - 15.4 % Final   04/08/2020 14.2 12.3 - 15.4 % Final     RDW-SD   Date Value Ref Range Status   04/08/2020 45.1 37.0 - 54.0 fl Final     MPV   Date Value Ref Range Status   04/08/2020 10.3 6.0 - 12.0 fL Final     Platelets   Date Value Ref Range Status   04/15/2020 189 150 - 450 x10E3/uL Final   04/08/2020 168 140 - 450 10*3/mm3 Final     Neutrophil Rel %   Date Value Ref Range Status   04/15/2020 54 Not Estab. % Final     Neutrophil %   Date Value Ref Range Status   04/08/2020 53.0 42.7 - 76.0 % Final     Lymphocyte Rel %   Date Value Ref Range Status   04/15/2020 30 Not Estab. % Final     Lymphocyte %   Date Value Ref Range Status   04/08/2020 27.3 19.6 - 45.3 % Final     Monocyte Rel %   Date Value Ref Range Status   04/15/2020 11 Not Estab. % Final     Monocyte %   Date Value Ref Range Status   04/08/2020 14.7 (H) 5.0 - 12.0 % Final     Eosinophil Rel %   Date Value Ref Range Status    04/15/2020 3 Not Estab. % Final     Eosinophil %   Date Value Ref Range Status   04/08/2020 4.3 0.3 - 6.2 % Final     Basophil Rel %   Date Value Ref Range Status   04/15/2020 1 Not Estab. % Final     Basophil %   Date Value Ref Range Status   04/08/2020 0.7 0.0 - 1.5 % Final     Neutrophils Absolute   Date Value Ref Range Status   04/15/2020 4.1 1.4 - 7.0 x10E3/uL Final     Neutrophils, Absolute   Date Value Ref Range Status   04/08/2020 3.81 1.70 - 7.00 10*3/mm3 Final     Lymphocytes Absolute   Date Value Ref Range Status   04/15/2020 2.3 0.7 - 3.1 x10E3/uL Final     Lymphocytes, Absolute   Date Value Ref Range Status   04/08/2020 1.96 0.70 - 3.10 10*3/mm3 Final     Monocytes Absolute   Date Value Ref Range Status   04/15/2020 0.8 0.1 - 0.9 x10E3/uL Final     Monocytes, Absolute   Date Value Ref Range Status   04/08/2020 1.06 (H) 0.10 - 0.90 10*3/mm3 Final     Eosinophils Absolute   Date Value Ref Range Status   04/15/2020 0.2 0.0 - 0.4 x10E3/uL Final     Eosinophils, Absolute   Date Value Ref Range Status   04/08/2020 0.31 0.00 - 0.40 10*3/mm3 Final     Basophils Absolute   Date Value Ref Range Status   04/15/2020 0.1 0.0 - 0.2 x10E3/uL Final     Basophils, Absolute   Date Value Ref Range Status   04/08/2020 0.05 0.00 - 0.20 10*3/mm3 Final     nRBC   Date Value Ref Range Status   03/19/2020 0.0 0.0 - 0.2 /100 WBC Final       Lab Results   Component Value Date    GLUCOSE 128 (H) 04/08/2020    BUN 14 04/08/2020    CREATININE 0.78 04/08/2020    EGFRIFNONA 73 04/08/2020    BCR 17.9 04/08/2020    K 3.9 04/08/2020    CO2 21.0 (L) 04/08/2020    CALCIUM 9.9 04/08/2020    ALBUMIN 3.90 04/08/2020    AST 23 04/08/2020    ALT 19 04/08/2020         Assessment/Plan      ASSESSMENT:     1. Metastatic pancreatic neuroendocrine tumor  2. Lung metastatic disease  3. Thyroid nodule  4. Coronary artery disease and CHF  5. Type 2 diabetes  6. Medical noncompliance  7. Chest pain  8. ECOG 2    PLAN:      9. She is on treatment with  with lanreotide.  It is 120 mcg that will be administered every 4 weeks.  She received first injection last week.  10. Regarding the chest pain offered to admit her to the hospital she present to the emergency room and she refused both.   11. I started her on Z-Edwin in hopes the pain is related to pneumonia and she agreed to that and a prescription was sent to her pharmacy   12. For the ongoing anxiety she was instructed to be on Elavil  13. we will check CMP and a CEA when she comes for the injection and also check  urine for 24 hours for 5-HIAA results are pending.   14.  Thyroid nodule noted on last PET CT scan and ultrasound.  Biopsy was negative in December 2018  15. Discussed with her to continue diuretics lack of which may cause fluid overload  16. Discussed with her about blood sugar control check hemoglobin A1c.  17. Had medical noncompliance mainly related to transportation.    Transportation issues have been solved   18. I will see her back in my office in 4 weeks with prior CBC and CMP.    I have reviewed labs results, imaging, vitals, and medications with the patient today.   Patient verbalized understanding and is in agreement of the above plan.  Electronically signed by Brandon Arroyo MD, 04/16/20, 2:35 PM.  Time spent 45 minutes        This report was compiled using Dragon voice recognition software. I have made every effort to proof read this document; however, typographical errors may persist.

## 2020-04-22 NOTE — CODE DOCUMENTATION
Pt was seen today in the [place] office for labs prior to her telephone visit with Dr Conner 4/16. Pt was very emotional and distressed, I ask her if she has spoken with our . She states she previously talked with Cathy Schwartz, , regarding her situation at home and the result of that conversation was that [facility] was contacted. The pt is upset because [facility] did a home visit and she states she has always been able to care for her grandchildren and there has never been a problem. She states she will not talk to anyone else in the future because she doesn't want [facility] involved. I spoke with Cathy and let her know my concerns and that the patient seemed offended that [facility] was involved.///[facility])

## 2020-05-06 PROBLEM — Z51.11 ENCOUNTER FOR ANTINEOPLASTIC CHEMOTHERAPY: Status: ACTIVE | Noted: 2020-01-01

## 2020-05-06 NOTE — PROGRESS NOTES
Hematology/Oncology Outpatient Follow Up    You have chosen to receive care through a telephone visit. Do you consent to use a telephone visit for your medical care today?yes  Christiane Heck  1950    Primary Care Physician: Gloria Barron APRN  Referring Physician: Gloria Barron APRN  Chief Complaint:   Pancreatic neuroendocrine tumor with metastatic disease to the lung  History of Present Illness:   · Ms. Heck was diagnosed with pulmonary nodules dominant on the left side in 2015.  · December 2017 patient underwent PET CT scan which showed dominant lingular mass that was seen on the prior chest CT on January 8, 2018 which was hypermetabolic consistent with malignancy.  · January 2018 left-sided lung nodule was biopsied and was positive for synaptophysin and focally positive for chromogranin.  · 2/15/2018 PET/CT [facility] revealed focal gallium-68 dotatate uptake within the mid body of the pancreas and bilateral pulmonary nodules including a 3.5 cm left lower lobe more consistent with metastatic disease.  · 2/20/2018 patient was initiated on lanreotide monthly at the [facility] under the care of Dr. Ivis Manrique  · Patient has been noncompliant with her appointments secondary to travel issues and social issues and patient was sent to the [facility] as a transfer from [facility] and seen initially on 3/19/2020.  · 1/31/2020 patient underwent PET CT scan for follow-up of CT scan of the chest that was done at [facility].  PET/CT was done at the [facility] which revealed an nodule in the thyroid gland in the left lobe with an SUV of 2.3 of 2 x 1.5 x 2.5 cm.  In the chest and the right upper lobe pulmonary nodule 1.2 x 1 x 1.5 cm with activity of 1.97.  There are multiple additional tiny and small pulmonary metastatic lesions scattered elsewhere throughout the right lung.  There are areas of non-cavitary pulmonary nodularity identified in the lingula and  the left lower lobe are also mild and moderately hypermetabolic with a maximum SUV of 6 which corresponds to the 4.25 x 3.5 x 4 cm pulmonary mass.  There are other tiny non-cavitary lesions in the left upper lobe.  Enlarged mildly metabolic adenopathy left side of the mediastinum 1.75 x 1.75 x 1.5 cm with SUV of 4.42.  Metastatic lymph node at the left.  Pulmonary ligament within a severe 5.83 and size of 5.75 x 4.5 x 7 cm.  Stable metastatic adenopathy in the subcarinal distribution 3.25 x 2.25 x 2.5 cm with an SUV of 3.29.  Abdomen and pelvis scan was benign.  · 4/8/20/2020-patient reinitiated on lanreotide 120 mg to be given every 4 weeks    Past Medical History:   Diagnosis Date   • CAD (coronary artery disease)    • GERD (gastroesophageal reflux disease)    • HLD (hyperlipidemia)    • Hypertension    • Muscle ache     Muscle ache/cramps   • Neuropathy    • Spinal stenosis     Chronic back pain       Past Surgical History:   Procedure Laterality Date   • CORONARY STENT PLACEMENT           Current Outpatient Medications:   •  amitriptyline (ELAVIL) 25 MG tablet, Take 25 mg by mouth every night at bedtime., Disp: , Rfl:   •  amoxicillin (AMOXIL) 500 MG capsule, TAKE ONE CAPSULE BY MOUTH THREE TIMES DAILY FOR 10 DAYS, Disp: , Rfl:   •  azithromycin (ZITHROMAX) 250 MG tablet, Take 2 tablets the first day, then 1 tablet daily for 4 days., Disp: 1 tablet, Rfl: 0  •  baclofen (LIORESAL) 20 MG tablet, Take 40 mg by mouth Daily., Disp: , Rfl:   •  clopidogrel (PLAVIX) 75 MG tablet, Take 75 mg by mouth Daily., Disp: , Rfl:   •  glipizide (GLUCOTROL) 5 MG tablet, Take 5 mg by mouth Daily., Disp: , Rfl:   •  JANUMET XR  MG tablet, Take 1 tablet by mouth Daily., Disp: , Rfl:   •  lisinopril-hydrochlorothiazide (PRINZIDE,ZESTORETIC) 10-12.5 MG per tablet, , Disp: , Rfl:   •  meloxicam (MOBIC) 7.5 MG tablet, Take 7.5 mg by mouth Daily., Disp: , Rfl:   •  oxyCODONE-acetaminophen (PERCOCET)  MG per tablet, TAKE ONE  TABLET BY MOUTH EVERY 4 TO 6 HOURS AS NEEDED, Disp: , Rfl:   •  rosuvastatin (CRESTOR) 10 MG tablet, , Disp: , Rfl:   •  sucralfate (CARAFATE) 1 g tablet, Take 1 g by mouth 4 (Four) Times a Day., Disp: , Rfl:     No Known Allergies    Family History   Problem Relation Age of Onset   • Lung cancer Mother    • Lung cancer Sister        Cancer-related family history includes Lung cancer in her mother and sister.    Social History     Tobacco Use   • Smoking status: Never Smoker   • Smokeless tobacco: Never Used   Substance Use Topics   • Alcohol use: Never     Frequency: Never   • Drug use: Never       I have reviewed the history of present illness, past medical history, family history, social history, lab results, all notes and other records since the patient was last seen at the cancer care center.      SUBJECTIVE:      Discussed with the patient on the telephone visit.  Reports pain in her left side of the chest is still there but is controlled..  She is very emotional as she always. reports to being stressed out at home.  Has neuropathy symptoms she is taking the oxycodone which is helping with her pain diarrhea is controlled anxiety persists.  Did not lose weight.  Elavil is not helping her to sleep    ROS:      Review of Systems   Constitutional: Negative for fever.   HENT: Negative for nosebleeds and trouble swallowing.    Eyes: Negative for visual disturbance.   Respiratory: Negative for cough, shortness of breath and wheezing.    Cardiovascular: Negative for chest pain.   Gastrointestinal: Negative for abdominal pain and blood in stool.   Endocrine: Negative for cold intolerance.   Genitourinary: Negative for dysuria and hematuria.   Musculoskeletal: Negative for joint swelling.   Skin: Negative for rash.   Allergic/Immunologic: Negative for environmental allergies.   Neurological: Negative for seizures.   Hematological: Does not bruise/bleed easily.   Psychiatric/Behavioral: The patient is not  "nervous/anxious.      MD performed ROS and are negative except as mentioned in Subjective.    Objective:       5/6/2020   Temp 97.6 °F (36.4 °C)   Pulse 88   Resp 18   /76   Height 144.9 cm (57.05\")   Weight 95.7 kg (211 lb)   BSA (Calculated - sq m) 1.84 sq meters   BMI (Calculated) 45.6             PHYSICAL EXAM:      Physical Exam   Constitutional: She is oriented to person, place, and time. No distress.   Moderately built morbidly obese   HENT:   Head: Normocephalic and atraumatic.   Eyes: Conjunctivae and EOM are normal. Right eye exhibits no discharge. Left eye exhibits no discharge. No scleral icterus.   Neck: Normal range of motion. Neck supple. No thyromegaly present.   Cardiovascular: Normal rate, regular rhythm and normal heart sounds. Exam reveals no gallop and no friction rub.   Pulmonary/Chest: Effort normal. No stridor. No respiratory distress. She has no wheezes.   Distant breath sounds bilaterally   Abdominal: Soft. Bowel sounds are normal. She exhibits no mass. There is no tenderness. There is no rebound and no guarding.   Musculoskeletal: Normal range of motion. She exhibits no tenderness.   1+ lower extremity edema   Lymphadenopathy:     She has no cervical adenopathy.   Neurological: She is alert and oriented to person, place, and time. She exhibits normal muscle tone.   Skin: Skin is warm. No rash noted. She is not diaphoretic. No erythema.   Psychiatric: She has a normal mood and affect. Her behavior is normal.   Nursing note and vitals reviewed.     Physical exam done by MD.    RECENT LABS:     WBC   Date Value Ref Range Status   04/15/2020 7.6 3.4 - 10.8 x10E3/uL Final     RBC   Date Value Ref Range Status   04/15/2020 4.91 3.77 - 5.28 x10E6/uL Final     Hemoglobin   Date Value Ref Range Status   04/15/2020 13.7 11.1 - 15.9 g/dL Final   04/08/2020 13.2 12.0 - 15.9 g/dL Final     Hematocrit   Date Value Ref Range Status   04/15/2020 41.9 34.0 - 46.6 % Final   04/08/2020 41.0 34.0 - 46.6 " % Final     MCV   Date Value Ref Range Status   04/15/2020 85 79 - 97 fL Final   04/08/2020 88.6 79.0 - 97.0 fL Final     MCH   Date Value Ref Range Status   04/15/2020 27.9 26.6 - 33.0 pg Final   04/08/2020 28.5 26.6 - 33.0 pg Final     MCHC   Date Value Ref Range Status   04/15/2020 32.7 31.5 - 35.7 g/dL Final   04/08/2020 32.2 31.5 - 35.7 g/dL Final     RDW   Date Value Ref Range Status   04/15/2020 13.1 11.7 - 15.4 % Final   04/08/2020 14.2 12.3 - 15.4 % Final     RDW-SD   Date Value Ref Range Status   04/08/2020 45.1 37.0 - 54.0 fl Final     MPV   Date Value Ref Range Status   04/08/2020 10.3 6.0 - 12.0 fL Final     Platelets   Date Value Ref Range Status   04/15/2020 189 150 - 450 x10E3/uL Final   04/08/2020 168 140 - 450 10*3/mm3 Final     Neutrophil Rel %   Date Value Ref Range Status   04/15/2020 54 Not Estab. % Final     Neutrophil %   Date Value Ref Range Status   04/08/2020 53.0 42.7 - 76.0 % Final     Lymphocyte Rel %   Date Value Ref Range Status   04/15/2020 30 Not Estab. % Final     Lymphocyte %   Date Value Ref Range Status   04/08/2020 27.3 19.6 - 45.3 % Final     Monocyte Rel %   Date Value Ref Range Status   04/15/2020 11 Not Estab. % Final     Monocyte %   Date Value Ref Range Status   04/08/2020 14.7 (H) 5.0 - 12.0 % Final     Eosinophil Rel %   Date Value Ref Range Status   04/15/2020 3 Not Estab. % Final     Eosinophil %   Date Value Ref Range Status   04/08/2020 4.3 0.3 - 6.2 % Final     Basophil Rel %   Date Value Ref Range Status   04/15/2020 1 Not Estab. % Final     Basophil %   Date Value Ref Range Status   04/08/2020 0.7 0.0 - 1.5 % Final     Neutrophils Absolute   Date Value Ref Range Status   04/15/2020 4.1 1.4 - 7.0 x10E3/uL Final     Neutrophils, Absolute   Date Value Ref Range Status   04/08/2020 3.81 1.70 - 7.00 10*3/mm3 Final     Lymphocytes Absolute   Date Value Ref Range Status   04/15/2020 2.3 0.7 - 3.1 x10E3/uL Final     Lymphocytes, Absolute   Date Value Ref Range Status    04/08/2020 1.96 0.70 - 3.10 10*3/mm3 Final     Monocytes Absolute   Date Value Ref Range Status   04/15/2020 0.8 0.1 - 0.9 x10E3/uL Final     Monocytes, Absolute   Date Value Ref Range Status   04/08/2020 1.06 (H) 0.10 - 0.90 10*3/mm3 Final     Eosinophils Absolute   Date Value Ref Range Status   04/15/2020 0.2 0.0 - 0.4 x10E3/uL Final     Eosinophils, Absolute   Date Value Ref Range Status   04/08/2020 0.31 0.00 - 0.40 10*3/mm3 Final     Basophils Absolute   Date Value Ref Range Status   04/15/2020 0.1 0.0 - 0.2 x10E3/uL Final     Basophils, Absolute   Date Value Ref Range Status   04/08/2020 0.05 0.00 - 0.20 10*3/mm3 Final     nRBC   Date Value Ref Range Status   03/19/2020 0.0 0.0 - 0.2 /100 WBC Final       Lab Results   Component Value Date    GLUCOSE 128 (H) 04/08/2020    BUN 14 04/08/2020    CREATININE 0.78 04/08/2020    EGFRIFNONA 73 04/08/2020    BCR 17.9 04/08/2020    K 3.9 04/08/2020    CO2 21.0 (L) 04/08/2020    CALCIUM 9.9 04/08/2020    ALBUMIN 3.90 04/08/2020    AST 23 04/08/2020    ALT 19 04/08/2020         Assessment/Plan      ASSESSMENT:     1. Metastatic pancreatic neuroendocrine tumor  2. Lung metastatic disease  3. Thyroid nodule  4. Coronary artery disease and CHF  5. Type 2 diabetes  6. Medical noncompliance  7. Chest pain  8. Insomnia  9. ECOG 2    PLAN:      10. She is on treatment with with lanreotide.  It is 120 mcg that will be administered every 4 weeks.  She receivedtwo injections last week.  11. Recorder for her chest pain related to malignancy  12. For the ongoing anxiety she was instructed to be on Elavil  13. Prescription of Restoril 30 mg was sent to pharmacy  14. we will check CMP and a CEA when she comes for the injection and also check  urine for 24 hours for 5-HIAA results are pending.   15. Discussed with her to continue diuretics lack of which may cause fluid overload  16. Discussed with her about blood sugar control check hemoglobin A1c.  17. Had medical noncompliance mainly  related to transportation.    Transportation issues have been resolved   18. I will see her back in my office in 4 weeks with prior CBC and CMP.    I have reviewed labs results, imaging, vitals, and medications with the patient today.   Patient verbalized understanding and is in agreement of the above plan.  I spent 40 minutes on this telephone visit      Electronically signed by Brandon Arroyo MD, 05/06/20, 11:22 AM.  Addendum .Electronically signed by Brandon Arroyo MD, 05/18/20, 9:46 AM.    This report was compiled using Dragon voice recognition software. I have made every effort to proof read this document; however, typographical errors may persist.

## 2020-05-29 NOTE — TELEPHONE ENCOUNTER
Case Management/ Note    Patient Name: Christiane Heck  YOB: 1950  MRN #: [id number]    OSW received a call from Sophie,  who said patient is requesting a complete schedule when she leaves after each appt as she has difficulty keeping up with her appt schedule.     Electronically signed by:   Cathy Schwartz LCSW, OSW-C  05/29/20, 2:21 PM

## 2020-05-29 NOTE — TELEPHONE ENCOUNTER
Discussed with Cathy. Will ensure patient has what she needs when she sees Dr. Conner on 6/3/2020.

## 2020-06-03 NOTE — PROGRESS NOTES
Patient saw the MD in office and then came back to us for an injection. She has no new complaints at this time. The injection she has ordered has not arrived yet per pharmacy. Patient could not wait so she was rescheduled for next week with [facility]la. Patient verbalized understanding.

## 2020-06-03 NOTE — PROGRESS NOTES
Hematology/Oncology Outpatient Follow Up    Christiane Heck  1950    Primary Care Physician: Gloria Barron APRN  Referring Physician: Gloria Barron APRN  Chief Complaint:   Pancreatic neuroendocrine tumor with metastatic disease to the lung  History of Present Illness:   · Ms. Heck was diagnosed with pulmonary nodules dominant on the left side in 2015.  · December 2017 patient underwent PET CT scan which showed dominant lingular mass that was seen on the prior chest CT on January 8, 2018 which was hypermetabolic consistent with malignancy.  · January 2018 left-sided lung nodule was biopsied and was positive for synaptophysin and focally positive for chromogranin.  · 2/15/2018 PET/CT [facility] revealed focal gallium-68 dotatate uptake within the mid body of the pancreas and bilateral pulmonary nodules including a 3.5 cm left lower lobe more consistent with metastatic disease.  · 2/20/2018 patient was initiated on lanreotide monthly at the [facility] under the care of Dr. Ivis Manrique  · Patient has been noncompliant with her appointments secondary to travel issues and social issues and patient was sent to the [facility] as a transfer from [facility] and seen initially on 3/19/2020.  · 1/31/2020 patient underwent PET CT scan for follow-up of CT scan of the chest that was done at [facility].  PET/CT was done at the [facility] which revealed an nodule in the thyroid gland in the left lobe with an SUV of 2.3 of 2 x 1.5 x 2.5 cm.  In the chest and the right upper lobe pulmonary nodule 1.2 x 1 x 1.5 cm with activity of 1.97.  There are multiple additional tiny and small pulmonary metastatic lesions scattered elsewhere throughout the right lung.  There are areas of non-cavitary pulmonary nodularity identified in the lingula and the left lower lobe are also mild and moderately hypermetabolic with a maximum SUV of 6 which corresponds to the 4.25 x 3.5 x 4 cm  pulmonary mass.  There are other tiny non-cavitary lesions in the left upper lobe.  Enlarged mildly metabolic adenopathy left side of the mediastinum 1.75 x 1.75 x 1.5 cm with SUV of 4.42.  Metastatic lymph node at the left.  Pulmonary ligament within a severe 5.83 and size of 5.75 x 4.5 x 7 cm.  Stable metastatic adenopathy in the subcarinal distribution 3.25 x 2.25 x 2.5 cm with an SUV of 3.29.  Abdomen and pelvis scan was benign.  · 4/8/20/2020-patient reinitiated on lanreotide 120 mg to be given every 4 weeks    Past Medical History:   Diagnosis Date   • CAD (coronary artery disease)    • GERD (gastroesophageal reflux disease)    • HLD (hyperlipidemia)    • Hypertension    • Muscle ache     Muscle ache/cramps   • Neuropathy    • Spinal stenosis     Chronic back pain       Past Surgical History:   Procedure Laterality Date   • CORONARY STENT PLACEMENT           Current Outpatient Medications:   •  amitriptyline (ELAVIL) 25 MG tablet, Take 25 mg by mouth every night at bedtime., Disp: , Rfl:   •  baclofen (LIORESAL) 20 MG tablet, Take 40 mg by mouth Daily., Disp: , Rfl:   •  clopidogrel (PLAVIX) 75 MG tablet, Take 75 mg by mouth Daily., Disp: , Rfl:   •  glipizide (GLUCOTROL) 5 MG tablet, Take 5 mg by mouth Daily., Disp: , Rfl:   •  JANUMET XR  MG tablet, Take 1 tablet by mouth Daily., Disp: , Rfl:   •  lisinopril-hydrochlorothiazide (PRINZIDE,ZESTORETIC) 10-12.5 MG per tablet, , Disp: , Rfl:   •  meloxicam (MOBIC) 7.5 MG tablet, Take 7.5 mg by mouth Daily., Disp: , Rfl:   •  oxyCODONE-acetaminophen (PERCOCET)  MG per tablet, TAKE ONE TABLET BY MOUTH EVERY 4 TO 6 HOURS AS NEEDED, Disp: , Rfl:   •  rosuvastatin (CRESTOR) 10 MG tablet, , Disp: , Rfl:   •  sucralfate (CARAFATE) 1 g tablet, Take 1 g by mouth 4 (Four) Times a Day., Disp: , Rfl:   •  temazepam (RESTORIL) 7.5 MG capsule, Take 4 capsules by mouth At Night As Needed for Sleep., Disp: 30 capsule, Rfl: 3  •  amoxicillin (AMOXIL) 500 MG capsule,  TAKE ONE CAPSULE BY MOUTH THREE TIMES DAILY FOR 10 DAYS, Disp: , Rfl:   •  azithromycin (ZITHROMAX) 250 MG tablet, Take 2 tablets the first day, then 1 tablet daily for 4 days., Disp: 1 tablet, Rfl: 0  No current facility-administered medications for this visit.     Facility-Administered Medications Ordered in Other Visits:   •  lanreotide acetate (Somatuline) 120 MG/0.5ML injection 120 mg, 120 mg, Subcutaneous, Once, Kenisha Griffiths APRN    No Known Allergies    Family History   Problem Relation Age of Onset   • Lung cancer Mother    • Lung cancer Sister        Cancer-related family history includes Lung cancer in her mother and sister.    Social History     Tobacco Use   • Smoking status: Never Smoker   • Smokeless tobacco: Never Used   Substance Use Topics   • Alcohol use: Never     Frequency: Never   • Drug use: Never       I have reviewed the history of present illness, past medical history, family history, social history, lab results, all notes and other records since the patient was last seen at the cancer care center.      SUBJECTIVE:      Patient is my office for follow-up of her neuroendocrine tumor.  Reports pain in her left side of the chest is still there but is controlled..  She is very emotional as she always. reports to being stressed out at home.  Has neuropathy symptoms she is taking the oxycodone which is helping with her pain diarrhea is controlled anxiety persists.  Did not lose weight.  Stressed out about the home situation.    ROS:      Review of Systems   Constitutional: Negative for fever.   HENT: Negative for nosebleeds and trouble swallowing.    Eyes: Negative for visual disturbance.   Respiratory: Negative for cough, shortness of breath and wheezing.    Cardiovascular: Negative for chest pain.   Gastrointestinal: Negative for abdominal pain and blood in stool.   Endocrine: Negative for cold intolerance.   Genitourinary: Negative for dysuria and hematuria.   Musculoskeletal: Negative  "for joint swelling.   Skin: Negative for rash.   Allergic/Immunologic: Negative for environmental allergies.   Neurological: Negative for seizures.   Hematological: Does not bruise/bleed easily.   Psychiatric/Behavioral: The patient is not nervous/anxious.      MD performed ROS and are negative except as mentioned in Subjective.    Objective:          /75   Pulse 84   Temp 98 °F (36.7 °C) (Oral)   Resp 24   Ht 154.9 cm (61\")   Wt 94.8 kg (209 lb)   SpO2 95%   BMI 39.49 kg/m²       PHYSICAL EXAM:      Physical Exam   Constitutional: She is oriented to person, place, and time. No distress.   Moderately built morbidly obese   HENT:   Head: Normocephalic and atraumatic.   Eyes: Conjunctivae and EOM are normal. Right eye exhibits no discharge. Left eye exhibits no discharge. No scleral icterus.   Neck: Normal range of motion. Neck supple. No thyromegaly present.   Cardiovascular: Normal rate, regular rhythm and normal heart sounds. Exam reveals no gallop and no friction rub.   Pulmonary/Chest: Effort normal. No stridor. No respiratory distress. She has no wheezes.   Distant breath sounds bilaterally   Abdominal: Soft. Bowel sounds are normal. She exhibits no mass. There is no tenderness. There is no rebound and no guarding.   Musculoskeletal: Normal range of motion. She exhibits no tenderness.   1+ lower extremity edema   Lymphadenopathy:     She has no cervical adenopathy.   Neurological: She is alert and oriented to person, place, and time. She exhibits normal muscle tone.   Skin: Skin is warm. No rash noted. She is not diaphoretic. No erythema.   Psychiatric: She has a normal mood and affect. Her behavior is normal.   Nursing note and vitals reviewed.     Physical exam done by MD.    RECENT LABS:     WBC   Date Value Ref Range Status   06/03/2020 8.44 3.40 - 10.80 10*3/mm3 Final   04/15/2020 7.6 3.4 - 10.8 x10E3/uL Final     RBC   Date Value Ref Range Status   06/03/2020 4.58 3.77 - 5.28 10*6/mm3 Final "   04/15/2020 4.91 3.77 - 5.28 x10E6/uL Final     Hemoglobin   Date Value Ref Range Status   06/03/2020 13.1 12.0 - 15.9 g/dL Final     Hematocrit   Date Value Ref Range Status   06/03/2020 40.5 34.0 - 46.6 % Final     MCV   Date Value Ref Range Status   06/03/2020 88.4 79.0 - 97.0 fL Final     MCH   Date Value Ref Range Status   06/03/2020 28.6 26.6 - 33.0 pg Final     MCHC   Date Value Ref Range Status   06/03/2020 32.3 31.5 - 35.7 g/dL Final     RDW   Date Value Ref Range Status   06/03/2020 14.6 12.3 - 15.4 % Final     RDW-SD   Date Value Ref Range Status   06/03/2020 46.4 37.0 - 54.0 fl Final     MPV   Date Value Ref Range Status   06/03/2020 10.7 6.0 - 12.0 fL Final     Platelets   Date Value Ref Range Status   06/03/2020 151 140 - 450 10*3/mm3 Final     Neutrophil %   Date Value Ref Range Status   06/03/2020 50.2 42.7 - 76.0 % Final     Lymphocyte %   Date Value Ref Range Status   06/03/2020 26.2 19.6 - 45.3 % Final     Monocyte %   Date Value Ref Range Status   06/03/2020 10.1 5.0 - 12.0 % Final     Eosinophil %   Date Value Ref Range Status   06/03/2020 12.8 (H) 0.3 - 6.2 % Final     Basophil %   Date Value Ref Range Status   06/03/2020 0.7 0.0 - 1.5 % Final     Neutrophils, Absolute   Date Value Ref Range Status   06/03/2020 4.24 1.70 - 7.00 10*3/mm3 Final     Lymphocytes, Absolute   Date Value Ref Range Status   06/03/2020 2.21 0.70 - 3.10 10*3/mm3 Final     Monocytes, Absolute   Date Value Ref Range Status   06/03/2020 0.85 0.10 - 0.90 10*3/mm3 Final     Eosinophils, Absolute   Date Value Ref Range Status   06/03/2020 1.08 (H) 0.00 - 0.40 10*3/mm3 Final     Basophils, Absolute   Date Value Ref Range Status   06/03/2020 0.06 0.00 - 0.20 10*3/mm3 Final     nRBC   Date Value Ref Range Status   03/19/2020 0.0 0.0 - 0.2 /100 WBC Final       Lab Results   Component Value Date    GLUCOSE 115 (H) 05/06/2020    BUN 13 05/06/2020    CREATININE 0.83 05/06/2020    EGFRIFNONA 68 05/06/2020    BCR 15.7 05/06/2020    K  4.4 05/06/2020    CO2 25.0 05/06/2020    CALCIUM 10.4 05/06/2020    ALBUMIN 4.20 05/06/2020    AST 26 05/06/2020    ALT 17 05/06/2020         Assessment/Plan      ASSESSMENT:     1. Metastatic pancreatic neuroendocrine tumor  2. Lung metastatic disease  3. Thyroid nodule  4. Coronary artery disease and CHF  5. Type 2 diabetes  6. Medical noncompliance  7. Chest pain  8. Insomnia  9. ECOG 2    PLAN:      10. She is on treatment with with lanreotide.  It is 120 mcg that will be administered every 4 weeks.  She receivedtwo injections last week.  11. Recorder for her chest pain related to malignancy  12. For the ongoing anxiety she was instructed to be on Elavil  13. Prescription of Restoril 30 mg was sent to pharmacy  14. we will check CMP and a CEA when she comes for the injection and also check  urine for 24 hours for 5-HIAA results are pending.   15. Discussed with her to continue diuretics lack of which may cause fluid overload  16. Discussed with her about blood sugar control check hemoglobin A1c.  17. Had medical noncompliance mainly related to transportation.    Transportation issues have been resolved   18. I will see her back in my office in 4 weeks with prior CBC and CMP.  19. Patient is being seen by our  from the cancer center.  20. I will obtain CT chest abdomen and pelvis.  I will see her back in my office after the scan is done in [facility]    I have reviewed labs results, imaging, vitals, and medications with the patient today.   Patient verbalized understanding and is in agreement of the above plan.  Electronically signed by Brandon Arroyo MD, 06/03/20, 4:19 PM.            This report was compiled using Dragon voice recognition software. I have made every effort to proof read this document; however, typographical errors may persist.

## 2020-06-08 NOTE — PROGRESS NOTES
Pt very upset and tearful, talking about personal family issues. Will send message to Cathy , to follow pt with personal family issues.     Pt also c/o constant pain/pressure in middle of her chest, states she wants a scan ordered to evaluate progression of her cancer. Pt also having left sided jaw and mouth discomfort and swelling. She wants to see MD about her jaw and her mouth to see if possibly starting antibiotic will help relieve symptoms. Notified Dr Conner who is going to see pt today. Let pt know that Dr Conner would see her today and she verbalized understanding.

## 2020-06-11 NOTE — PROGRESS NOTES
Hematology/Oncology Outpatient Follow Up    Christiane Heck  1950    Primary Care Physician: Gloria Barron APRN  Referring Physician: Gloria Barron APRN  Chief Complaint:   Pancreatic neuroendocrine tumor with metastatic disease to the lung  History of Present Illness:   · Ms. Heck was diagnosed with pulmonary nodules dominant on the left side in 2015.  · December 2017 patient underwent PET CT scan which showed dominant lingular mass that was seen on the prior chest CT on January 8, 2018 which was hypermetabolic consistent with malignancy.  · January 2018 left-sided lung nodule was biopsied and was positive for synaptophysin and focally positive for chromogranin.  · 2/15/2018 PET/CT [facility] revealed focal gallium-68 dotatate uptake within the mid body of the pancreas and bilateral pulmonary nodules including a 3.5 cm left lower lobe more consistent with metastatic disease.  · 2/20/2018 patient was initiated on lanreotide monthly at the [facility] under the care of Dr. Ivis Manrique  · Patient has been noncompliant with her appointments secondary to travel issues and social issues and patient was sent to the [facility] as a transfer from [facility] and seen initially on 3/19/2020.  · 1/31/2020 patient underwent PET CT scan for follow-up of CT scan of the chest that was done at [facility].  PET/CT was done at the [facility] which revealed an nodule in the thyroid gland in the left lobe with an SUV of 2.3 of 2 x 1.5 x 2.5 cm.  In the chest and the right upper lobe pulmonary nodule 1.2 x 1 x 1.5 cm with activity of 1.97.  There are multiple additional tiny and small pulmonary metastatic lesions scattered elsewhere throughout the right lung.  There are areas of non-cavitary pulmonary nodularity identified in the lingula and the left lower lobe are also mild and moderately hypermetabolic with a maximum SUV of 6 which corresponds to the 4.25 x 3.5 x 4 cm  pulmonary mass.  There are other tiny non-cavitary lesions in the left upper lobe.  Enlarged mildly metabolic adenopathy left side of the mediastinum 1.75 x 1.75 x 1.5 cm with SUV of 4.42.  Metastatic lymph node at the left.  Pulmonary ligament within a severe 5.83 and size of 5.75 x 4.5 x 7 cm.  Stable metastatic adenopathy in the subcarinal distribution 3.25 x 2.25 x 2.5 cm with an SUV of 3.29.  Abdomen and pelvis scan was benign.  · 4/8/20/2020-patient reinitiated on lanreotide 120 mg to be given every 4 weeks    Past Medical History:   Diagnosis Date   • CAD (coronary artery disease)    • GERD (gastroesophageal reflux disease)    • HLD (hyperlipidemia)    • Hypertension    • Muscle ache     Muscle ache/cramps   • Neuropathy    • Spinal stenosis     Chronic back pain       Past Surgical History:   Procedure Laterality Date   • CORONARY STENT PLACEMENT           Current Outpatient Medications:   •  amitriptyline (ELAVIL) 25 MG tablet, Take 25 mg by mouth every night at bedtime., Disp: , Rfl:   •  amoxicillin-clavulanate (AUGMENTIN) 875-125 MG per tablet, Take 1 tablet by mouth 2 (Two) Times a Day., Disp: 20 tablet, Rfl: 0  •  azithromycin (ZITHROMAX) 250 MG tablet, Take 2 tablets the first day, then 1 tablet daily for 4 days., Disp: 1 tablet, Rfl: 0  •  baclofen (LIORESAL) 20 MG tablet, Take 40 mg by mouth Daily., Disp: , Rfl:   •  clopidogrel (PLAVIX) 75 MG tablet, Take 75 mg by mouth Daily., Disp: , Rfl:   •  glipizide (GLUCOTROL) 5 MG tablet, Take 5 mg by mouth Daily., Disp: , Rfl:   •  JANUMET XR  MG tablet, Take 1 tablet by mouth Daily., Disp: , Rfl:   •  lisinopril-hydrochlorothiazide (PRINZIDE,ZESTORETIC) 10-12.5 MG per tablet, , Disp: , Rfl:   •  meloxicam (MOBIC) 7.5 MG tablet, Take 7.5 mg by mouth Daily., Disp: , Rfl:   •  oxyCODONE-acetaminophen (PERCOCET)  MG per tablet, TAKE ONE TABLET BY MOUTH EVERY 4 TO 6 HOURS AS NEEDED, Disp: , Rfl:   •  rosuvastatin (CRESTOR) 10 MG tablet, , Disp: ,  Rfl:   •  sucralfate (CARAFATE) 1 g tablet, Take 1 g by mouth 4 (Four) Times a Day., Disp: , Rfl:   •  temazepam (RESTORIL) 7.5 MG capsule, Take 4 capsules by mouth At Night As Needed for Sleep., Disp: 30 capsule, Rfl: 3    No Known Allergies    Family History   Problem Relation Age of Onset   • Lung cancer Mother    • Lung cancer Sister        Cancer-related family history includes Lung cancer in her mother and sister.    Social History     Tobacco Use   • Smoking status: Never Smoker   • Smokeless tobacco: Never Used   Substance Use Topics   • Alcohol use: Never     Frequency: Never   • Drug use: Never       I have reviewed the history of present illness, past medical history, family history, social history, lab results, all notes and other records since the patient was last seen at the cancer care center.      SUBJECTIVE:      Patient is my office for follow-up of her neuroendocrine tumor hoping the CT scan results will be available.  Patient's appointment is next week.  Reports that the pain is controlled now.  Patient is still emotional as usual.  But during the visit towards the end she reports feeling better after his talking to me and she is glad I was able to see her today without an appointment..  Pain in jaw is improving with antibiotic  ROS:      Review of Systems   Constitutional: Negative for fever.   HENT: Negative for nosebleeds and trouble swallowing.    Eyes: Negative for visual disturbance.   Respiratory: Negative for cough, shortness of breath and wheezing.    Cardiovascular: Negative for chest pain.   Gastrointestinal: Negative for abdominal pain and blood in stool.   Endocrine: Negative for cold intolerance.   Genitourinary: Negative for dysuria and hematuria.   Musculoskeletal: Negative for joint swelling.   Skin: Negative for rash.   Allergic/Immunologic: Negative for environmental allergies.   Neurological: Negative for seizures.   Hematological: Does not bruise/bleed easily.    Psychiatric/Behavioral: The patient is not nervous/anxious (.bhesig).      MD performed ROS and are negative except as mentioned in Subjective.    Objective:          There were no vitals taken for this visit.      PHYSICAL EXAM:      Physical Exam   Constitutional: She is oriented to person, place, and time. No distress.   Moderately built morbidly obese   HENT:   Head: Normocephalic and atraumatic.   Eyes: Conjunctivae and EOM are normal. Right eye exhibits no discharge. Left eye exhibits no discharge. No scleral icterus.   Neck: Normal range of motion. Neck supple. No thyromegaly present.   Cardiovascular: Normal rate, regular rhythm and normal heart sounds. Exam reveals no gallop and no friction rub.   Pulmonary/Chest: Effort normal. No stridor. No respiratory distress. She has no wheezes.   Distant breath sounds bilaterally   Abdominal: Soft. Bowel sounds are normal. She exhibits no mass. There is no tenderness. There is no rebound and no guarding.   Musculoskeletal: Normal range of motion. She exhibits no tenderness.   1+ lower extremity edema   Lymphadenopathy:     She has no cervical adenopathy.   Neurological: She is alert and oriented to person, place, and time. She exhibits normal muscle tone.   Skin: Skin is warm. No rash noted. She is not diaphoretic. No erythema.   Psychiatric: She has a normal mood and affect. Her behavior is normal.   Nursing note and vitals reviewed.     Physical exam done by MD.    RECENT LABS:     WBC   Date Value Ref Range Status   06/03/2020 8.44 3.40 - 10.80 10*3/mm3 Final   04/15/2020 7.6 3.4 - 10.8 x10E3/uL Final     RBC   Date Value Ref Range Status   06/03/2020 4.58 3.77 - 5.28 10*6/mm3 Final   04/15/2020 4.91 3.77 - 5.28 x10E6/uL Final     Hemoglobin   Date Value Ref Range Status   06/03/2020 13.1 12.0 - 15.9 g/dL Final     Hematocrit   Date Value Ref Range Status   06/03/2020 40.5 34.0 - 46.6 % Final     MCV   Date Value Ref Range Status   06/03/2020 88.4 79.0 - 97.0 fL  Final     MCH   Date Value Ref Range Status   06/03/2020 28.6 26.6 - 33.0 pg Final     MCHC   Date Value Ref Range Status   06/03/2020 32.3 31.5 - 35.7 g/dL Final     RDW   Date Value Ref Range Status   06/03/2020 14.6 12.3 - 15.4 % Final     RDW-SD   Date Value Ref Range Status   06/03/2020 46.4 37.0 - 54.0 fl Final     MPV   Date Value Ref Range Status   06/03/2020 10.7 6.0 - 12.0 fL Final     Platelets   Date Value Ref Range Status   06/03/2020 151 140 - 450 10*3/mm3 Final     Neutrophil %   Date Value Ref Range Status   06/03/2020 50.2 42.7 - 76.0 % Final     Lymphocyte %   Date Value Ref Range Status   06/03/2020 26.2 19.6 - 45.3 % Final     Monocyte %   Date Value Ref Range Status   06/03/2020 10.1 5.0 - 12.0 % Final     Eosinophil %   Date Value Ref Range Status   06/03/2020 12.8 (H) 0.3 - 6.2 % Final     Basophil %   Date Value Ref Range Status   06/03/2020 0.7 0.0 - 1.5 % Final     Neutrophils, Absolute   Date Value Ref Range Status   06/03/2020 4.24 1.70 - 7.00 10*3/mm3 Final     Lymphocytes, Absolute   Date Value Ref Range Status   06/03/2020 2.21 0.70 - 3.10 10*3/mm3 Final     Monocytes, Absolute   Date Value Ref Range Status   06/03/2020 0.85 0.10 - 0.90 10*3/mm3 Final     Eosinophils, Absolute   Date Value Ref Range Status   06/03/2020 1.08 (H) 0.00 - 0.40 10*3/mm3 Final     Basophils, Absolute   Date Value Ref Range Status   06/03/2020 0.06 0.00 - 0.20 10*3/mm3 Final     nRBC   Date Value Ref Range Status   03/19/2020 0.0 0.0 - 0.2 /100 WBC Final       Lab Results   Component Value Date    GLUCOSE 239 (H) 06/03/2020    BUN  06/03/2020      Comment:      Testing performed by alternate method    BUN 15 06/03/2020    CREATININE 0.84 06/03/2020    EGFRIFNONA 67 06/03/2020    BCR  06/03/2020      Comment:      Testing not performed.    K 3.5 06/03/2020    CO2 23.0 06/03/2020    CALCIUM 10.2 06/03/2020    ALBUMIN 4.30 06/03/2020    AST 19 06/03/2020    ALT 15 06/03/2020         Assessment/Plan       ASSESSMENT:     1. Metastatic pancreatic neuroendocrine tumor  2. Lung metastatic disease  3. Thyroid nodule  4. Coronary artery disease and CHF  5. Type 2 diabetes  6. Medical noncompliance  7. Chest pain  8. Insomnia  9. Left jaw pain  10. ECOG 2    PLAN:      11. She is on treatment with with lanreotide.  It is 120 mcg that will be administered every 4 weeks.  She receivedtwo injections last week.  12. Await CT scan reports I contacted the [facility] and the report will not be available for 24 to 48 hours.    13. For the ongoing anxiety she was instructed to be on Elavil  14. Prescription of Restoril 30 mg was sent to pharmacy  15. we will check CMP and a CEA when she comes for the injection and also check  urine for 24 hours for 5-HIAA results are pending.   16. Discussed with her to continue diuretics lack of which may cause fluid overload  17. Discussed with her about blood sugar control check hemoglobin A1c.  18. Had medical noncompliance mainly related to transportation.    Transportation issues have been resolved   19. I will see her back in my office in 4 weeks with prior CBC and CMP.  20. Patient is being seen by our  from the cancer center..  21. I informed her to complete the course of Augmentin for her jaw pain  22. Home situation was discussed with our .  23. I will see her in my office next week    I have reviewed labs results, imaging, vitals, and medications with the patient today.   Patient verbalized understanding and is in agreement of the above plan.  Electronically signed by Brandon Arroyo MD, 06/11/20, 2:21 PM.              This report was compiled using Dragon voice recognition software. I have made every effort to proof read this document; however, typographical errors may persist.

## 2020-06-15 NOTE — TELEPHONE ENCOUNTER
Case Management/ Note    Patient Name: Christiane Heck  YOB: 1950  MRN #: [id number]    OSW called patient at the request of both ELISABETH De and LEA Escobedo. Patient is alert and oriented to person, place and time. She states she has stress related to the tests that she has had and awaiting the results. She said her grandchildren are doing fine except that since school has been out they have not been able to do anything they'd like to do. She said they spent the night with her son Pancho who invited their mother to his home and they spent time with their mother. According to Christiane, Pancho told her that her daughter, Fredi was doing well. She also reports that Fredi was just released from alf after having been arrested due to drugs. [employer] hotline contacted to ascertain if they are still assisting the family. They advised to call the [facility]. Office at [phone] who said they are not involved with the family at this time and did not see a reason to open a case when reported in April.     Electronically signed by:   Cathy Schwartz LCSW, OSW-C  06/15/20, 2:52 PM

## 2020-06-18 NOTE — PROGRESS NOTES
Hematology/Oncology Outpatient Follow Up    Christiane Heck  1950    Primary Care Physician: Gloria Barron APRN  Referring Physician: Gloria Barron APRN  Chief Complaint:   Pancreatic neuroendocrine tumor with metastatic disease to the lung  History of Present Illness:   · Ms. Heck was diagnosed with pulmonary nodules dominant on the left side in 2015.  · December 2017 patient underwent PET CT scan which showed dominant lingular mass that was seen on the prior chest CT on January 8, 2018 which was hypermetabolic consistent with malignancy.  · January 2018 left-sided lung nodule was biopsied and was positive for synaptophysin and focally positive for chromogranin.  · 2/15/2018 PET/CT [facility] revealed focal gallium-68 dotatate uptake within the mid body of the pancreas and bilateral pulmonary nodules including a 3.5 cm left lower lobe more consistent with metastatic disease.  · 2/20/2018 patient was initiated on lanreotide monthly at the [facility] under the care of Dr. Ivis Manrique  · Patient has been noncompliant with her appointments secondary to travel issues and social issues and patient was sent to the [facility] as a transfer from [facility] and seen initially on 3/19/2020.  · 1/31/2020 patient underwent PET CT scan for follow-up of CT scan of the chest that was done at [facility].  PET/CT was done at the [facility] which revealed an nodule in the thyroid gland in the left lobe with an SUV of 2.3 of 2 x 1.5 x 2.5 cm.  In the chest and the right upper lobe pulmonary nodule 1.2 x 1 x 1.5 cm with activity of 1.97.  There are multiple additional tiny and small pulmonary metastatic lesions scattered elsewhere throughout the right lung.  There are areas of non-cavitary pulmonary nodularity identified in the lingula and the left lower lobe are also mild and moderately hypermetabolic with a maximum SUV of 6 which corresponds to the 4.25 x 3.5 x 4 cm  pulmonary mass.  There are other tiny non-cavitary lesions in the left upper lobe.  Enlarged mildly metabolic adenopathy left side of the mediastinum 1.75 x 1.75 x 1.5 cm with SUV of 4.42.  Metastatic lymph node at the left.  Pulmonary ligament within a severe 5.83 and size of 5.75 x 4.5 x 7 cm.  Stable metastatic adenopathy in the subcarinal distribution 3.25 x 2.25 x 2.5 cm with an SUV of 3.29.  Abdomen and pelvis scan was benign.  · 4/8/20/2020-patient reinitiated on lanreotide 120 mg to be given every 4 weeks  · 6/10/2020 CT soft tissue neck chest abdomen and pelvis with contrast were obtained, most of the disease was stable with mild increase in size of the masses in the lungs.  Past Medical History:   Diagnosis Date   • CAD (coronary artery disease)    • GERD (gastroesophageal reflux disease)    • HLD (hyperlipidemia)    • Hypertension    • Muscle ache     Muscle ache/cramps   • Neuropathy    • Spinal stenosis     Chronic back pain       Past Surgical History:   Procedure Laterality Date   • CORONARY STENT PLACEMENT           Current Outpatient Medications:   •  amitriptyline (ELAVIL) 25 MG tablet, Take 25 mg by mouth every night at bedtime., Disp: , Rfl:   •  amoxicillin-clavulanate (AUGMENTIN) 875-125 MG per tablet, Take 1 tablet by mouth 2 (Two) Times a Day., Disp: 20 tablet, Rfl: 0  •  azithromycin (ZITHROMAX) 250 MG tablet, Take 2 tablets the first day, then 1 tablet daily for 4 days., Disp: 1 tablet, Rfl: 0  •  baclofen (LIORESAL) 20 MG tablet, Take 40 mg by mouth Daily., Disp: , Rfl:   •  clopidogrel (PLAVIX) 75 MG tablet, Take 75 mg by mouth Daily., Disp: , Rfl:   •  glipizide (GLUCOTROL) 5 MG tablet, Take 5 mg by mouth Daily., Disp: , Rfl:   •  JANUMET XR  MG tablet, Take 1 tablet by mouth Daily., Disp: , Rfl:   •  lisinopril-hydrochlorothiazide (PRINZIDE,ZESTORETIC) 10-12.5 MG per tablet, , Disp: , Rfl:   •  meloxicam (MOBIC) 7.5 MG tablet, Take 7.5 mg by mouth Daily., Disp: , Rfl:   •   oxyCODONE-acetaminophen (PERCOCET)  MG per tablet, TAKE ONE TABLET BY MOUTH EVERY 4 TO 6 HOURS AS NEEDED, Disp: , Rfl:   •  rosuvastatin (CRESTOR) 10 MG tablet, , Disp: , Rfl:   •  sucralfate (CARAFATE) 1 g tablet, Take 1 g by mouth 4 (Four) Times a Day., Disp: , Rfl:   •  temazepam (RESTORIL) 7.5 MG capsule, Take 4 capsules by mouth At Night As Needed for Sleep., Disp: 30 capsule, Rfl: 3  •  VENTOLIN  (90 Base) MCG/ACT inhaler, Inhale 2 puffs Every 4 (Four) Hours As Needed., Disp: , Rfl:     No Known Allergies    Family History   Problem Relation Age of Onset   • Lung cancer Mother    • Lung cancer Sister        Cancer-related family history includes Lung cancer in her mother and sister.    Social History     Tobacco Use   • Smoking status: Never Smoker   • Smokeless tobacco: Never Used   Substance Use Topics   • Alcohol use: Never     Frequency: Never   • Drug use: Never       I have reviewed the history of present illness, past medical history, family history, social history, lab results, all notes and other records since the patient was last seen at the cancer care center.      SUBJECTIVE:      Patient is my office for follow-up of her neuroendocrine tumor following CT scans.  Chest pain continues.  Patient does not want to take pain medication secondary to her responsibilities at home.  Patient is less emotional today did not repeat today pain in the jaw is improving with antibiotics, I do not think she is taking the antibiotics as directed because these were given 2 weeks ago and she still has some medication left.    ROS:      Review of Systems   Constitutional: Negative for fever.   HENT: Negative for nosebleeds and trouble swallowing.    Eyes: Negative for visual disturbance.   Respiratory: Negative for cough, shortness of breath and wheezing.    Cardiovascular: Negative for chest pain.   Gastrointestinal: Negative for abdominal pain and blood in stool.   Endocrine: Negative for cold  "intolerance.   Genitourinary: Negative for dysuria and hematuria.   Musculoskeletal: Negative for joint swelling.   Skin: Negative for rash.   Allergic/Immunologic: Negative for environmental allergies.   Neurological: Negative for seizures.   Hematological: Does not bruise/bleed easily.   Psychiatric/Behavioral: The patient is not nervous/anxious (.bhesig).      MD performed ROS and are negative except as mentioned in Subjective.    Objective:          /85   Pulse 92   Temp 98.4 °F (36.9 °C)   Ht 154.9 cm (61\")   Wt 92.5 kg (204 lb)   BMI 38.55 kg/m²       PHYSICAL EXAM:      Physical Exam   Constitutional: She is oriented to person, place, and time. No distress.   Moderately built morbidly obese   HENT:   Head: Normocephalic and atraumatic.   Eyes: Conjunctivae and EOM are normal. Right eye exhibits no discharge. Left eye exhibits no discharge. No scleral icterus.   Neck: Normal range of motion. Neck supple. No thyromegaly present.   Cardiovascular: Normal rate, regular rhythm and normal heart sounds. Exam reveals no gallop and no friction rub.   Pulmonary/Chest: Effort normal. No stridor. No respiratory distress. She has no wheezes.   Distant breath sounds bilaterally   Abdominal: Soft. Bowel sounds are normal. She exhibits no mass. There is no tenderness. There is no rebound and no guarding.   Musculoskeletal: Normal range of motion. She exhibits no tenderness.   1+ lower extremity edema   Lymphadenopathy:     She has no cervical adenopathy.   Neurological: She is alert and oriented to person, place, and time. She exhibits normal muscle tone.   Skin: Skin is warm. No rash noted. She is not diaphoretic. No erythema.   Psychiatric: She has a normal mood and affect. Her behavior is normal.   Nursing note and vitals reviewed.     Physical exam done by MD.    RECENT LABS:     WBC   Date Value Ref Range Status   06/03/2020 8.44 3.40 - 10.80 10*3/mm3 Final   04/15/2020 7.6 3.4 - 10.8 x10E3/uL Final     RBC "   Date Value Ref Range Status   06/03/2020 4.58 3.77 - 5.28 10*6/mm3 Final   04/15/2020 4.91 3.77 - 5.28 x10E6/uL Final     Hemoglobin   Date Value Ref Range Status   06/03/2020 13.1 12.0 - 15.9 g/dL Final     Hematocrit   Date Value Ref Range Status   06/03/2020 40.5 34.0 - 46.6 % Final     MCV   Date Value Ref Range Status   06/03/2020 88.4 79.0 - 97.0 fL Final     MCH   Date Value Ref Range Status   06/03/2020 28.6 26.6 - 33.0 pg Final     MCHC   Date Value Ref Range Status   06/03/2020 32.3 31.5 - 35.7 g/dL Final     RDW   Date Value Ref Range Status   06/03/2020 14.6 12.3 - 15.4 % Final     RDW-SD   Date Value Ref Range Status   06/03/2020 46.4 37.0 - 54.0 fl Final     MPV   Date Value Ref Range Status   06/03/2020 10.7 6.0 - 12.0 fL Final     Platelets   Date Value Ref Range Status   06/03/2020 151 140 - 450 10*3/mm3 Final     Neutrophil %   Date Value Ref Range Status   06/03/2020 50.2 42.7 - 76.0 % Final     Lymphocyte %   Date Value Ref Range Status   06/03/2020 26.2 19.6 - 45.3 % Final     Monocyte %   Date Value Ref Range Status   06/03/2020 10.1 5.0 - 12.0 % Final     Eosinophil %   Date Value Ref Range Status   06/03/2020 12.8 (H) 0.3 - 6.2 % Final     Basophil %   Date Value Ref Range Status   06/03/2020 0.7 0.0 - 1.5 % Final     Neutrophils, Absolute   Date Value Ref Range Status   06/03/2020 4.24 1.70 - 7.00 10*3/mm3 Final     Lymphocytes, Absolute   Date Value Ref Range Status   06/03/2020 2.21 0.70 - 3.10 10*3/mm3 Final     Monocytes, Absolute   Date Value Ref Range Status   06/03/2020 0.85 0.10 - 0.90 10*3/mm3 Final     Eosinophils, Absolute   Date Value Ref Range Status   06/03/2020 1.08 (H) 0.00 - 0.40 10*3/mm3 Final     Basophils, Absolute   Date Value Ref Range Status   06/03/2020 0.06 0.00 - 0.20 10*3/mm3 Final     nRBC   Date Value Ref Range Status   03/19/2020 0.0 0.0 - 0.2 /100 WBC Final       Lab Results   Component Value Date    GLUCOSE 239 (H) 06/03/2020    BUN  06/03/2020      Comment:       Testing performed by alternate method    BUN 15 06/03/2020    CREATININE 0.84 06/03/2020    EGFRIFNONA 67 06/03/2020    BCR  06/03/2020      Comment:      Testing not performed.    K 3.5 06/03/2020    CO2 23.0 06/03/2020    CALCIUM 10.2 06/03/2020    ALBUMIN 4.30 06/03/2020    AST 19 06/03/2020    ALT 15 06/03/2020         Assessment/Plan      ASSESSMENT:     1. Metastatic pancreatic neuroendocrine tumor  2. Lung metastatic disease  3. Thyroid nodule  4. Coronary artery disease and CHF  5. Type 2 diabetes  6. Medical noncompliance  7. Chest pain  8. Insomnia  9. Left jaw pain  10. ECOG 2    PLAN:      11. She is on treatment with with lanreotide.  It is 120 mcg that will be administered every 4 weeks.  She receivedtwo injections last week.  12. Given the chest pain and mass in the lung I will schedule appointment with radiation oncology for palliative radiation if possible.  Patient's previous treatments were at [facility].  13. For the ongoing anxiety she was instructed to be on Elavil  14. Prescription of Restoril 30 mg was sent to pharmacy  15. medical noncompliance mainly related to transportation.    Transportation issues have been resolved   16. I will see her back in my office in 4 weeks with prior CBC and CMP.  17. Patient is being seen by our  from the cancer center..  18. I informed her to complete the course of Augmentin for her jaw pain  19. Home situation was discussed with our .  20. I will see her in my office next week    I have reviewed labs results, imaging, vitals, and medications with the patient today.   Patient verbalized understanding and is in agreement of the above plan.  Electronically signed by Brandon Arroyo MD, 06/18/20, 6:59 PM.                This report was compiled using Dragon voice recognition software. I have made every effort to proof read this document; however, typographical errors may persist.

## 2020-06-19 NOTE — TELEPHONE ENCOUNTER
Called [phone] and HOWARD for [facility] concerning getting medical records faxed to [phone].

## 2020-06-23 NOTE — TELEPHONE ENCOUNTER
Called [phone] and LVM for [facility] in Radiation Oncology to please fax records to [phone] as we have not yet received records for the patient.

## 2020-06-23 NOTE — TELEPHONE ENCOUNTER
Called [facility] and had them burn disc of images on 6/10/2020 to a disc.Faxed a request to [phone]. Called [facility] at [phone] and requested they  the disc of images and bring them to [address] [place] [address].

## 2020-06-25 NOTE — TELEPHONE ENCOUNTER
Spoke w/ Christiane. She did not have transportation to come to her Consult w/ Dr. Serna. She R/S her appt. She asked to have appt on Tues 6/30/2020 at 1 for transportation reasons

## 2020-06-30 NOTE — PROGRESS NOTES
Pt here for Somatuline injection with no complaints of symptoms. Pt is very tearful about getting treatment and radiation with the fear of being sick. Pt has custody of her 3 grandchildren. Cathy  did met with pt today.

## 2020-06-30 NOTE — TELEPHONE ENCOUNTER
Case Management/ Note    Patient Name: Christiane Heck  YOB: 1950  MRN #: [id number]    OSW called patient for prescreening. She was unaware of her appointment tomorrow for her somatuline and said she would have to arrange transportation. She has an appt today for radiation oncology. OSW is checking to see if she can get her injection today. Checked with LEA Lezama and aida Rios. The patient, LEA Lezama and Gabriel pharmacist are agreeable to this. Christiane is aware she will come back upstairs to check in and with receive her medication. She is also aware she may have to wait longer. She gave v/u and was okay with this arrangement.     Electronically signed by:   Cathy Schwartz LCSW, OSW-C  06/30/20, 10:39 AM

## 2020-06-30 NOTE — TELEPHONE ENCOUNTER
"Case Management/ Note    Patient Name: Christiane Heck  YOB: 1950  MRN #: [id number]    OSW called and spoke with Augusta at [facility] and explained that this patient needed urgent radiation treatment. She said this would have to be confirmed by a nurse; LEA Cortés confirmed this. She then explained that patient would likely not be picked up on the 1st due to late call in. Later, she said multiple calls would have to be made as she is not to schedule anymore than 4 calls at one time. OSW asked to speak with a supervisor and spoke with Sarah. She said that Chemotherapy and radiation were in the same category and deemed to be urgent for patients, and they would \"do their best\" to find transportation for the 1st, but still not guaranteed. She arranged transportation for Christiane's 10 radiation therapy treatments at 10:00 each day. PU time is 8:52 AM. Confirmation numbers below:     1st: [id number]  2nd: [id number]  6th: [id number]  7th: [id number]  8th: [id number]  9th: [id number]  10th: [id number]  13th: [id number]  14th: [id number]  15th [id number]    Christiane was given confirmation numbers and told of her appt time, and  time. Explained that it was not likely that [facility] would be able to pick her up tomorrow and explained that she needed to be ready by 8:30 in case we needed to make other transportation arrangements. OSW spoke with Che, director and advised her of the above. Discussed with her getting a cab voucher if [facility] is unable to make the transportation on the 1st. She approved this.     Electronically signed by:   Cathy Schwartz LCSW, OSW-C  06/30/20, 3:01 PM        "

## 2020-08-03 PROBLEM — E66.01 MORBIDLY OBESE (HCC): Status: ACTIVE | Noted: 2020-01-01

## 2020-08-03 NOTE — PROGRESS NOTES
Case Management/ Note    Patient Name: Christiane Heck  YOB: 1950  MRN #: [id number]    OSW met with patient at the request of LEA Hanna. Patient is alert and oriented to person, place and time. She is tearful at times. She is concerned about the kids going to school. They have the option of going to school online or in-person. She wants them to do on-line school due to covid-19 but the children want to go citing they failed the classes they took online at the end of the year last year. She asked what she should do; suggested that she contact the school and ask what additional help they can give to allow the children to be successful with online school. She gave v/u. She spoke about the children's fear of her dying, especially Tracy who has taken a mattress and is sleeping in her room with her. Encouraged her to allow the children to talk about how they feel. She said her daughter Fredi is back on drugs, comes over to the home and is disrespectful to her and the children by calling them names. She said Fredi will not accept any help at this time. She added that after the last time she came over she is not going to allow her to come over again. Basic needs are met at this time. Supportive care provided throughout. OSW will remain available.     Electronically signed by:   Cathy Schwartz LCSW, OSW-C  08/03/20, 2:42 PM

## 2020-08-03 NOTE — PROGRESS NOTES
Cardiology Consult Note    Patient Identification:  Name: Christiane Heck  Age: 70 y.o.  Sex: female  :  1950  MRN: [id number]             Requesting Physician :  Dr. Cade Serna    Reason for Consultation / Chief Complaint : Chest pain, CAD, PCI    History of Present Illness:      This is a 70-year-old with PMH of    -CAD history of CAPRICE to RCA 2015 details of which are not available  -Hypertension  -Dyslipidemia  -Spinal stenosis, GERD  -Metastatic neuroendocrine carcinoma, currently undergoing XRT and chemo  -Allergy/intolerance to Ambien  -Family history of premature CAD with father having MI in at age 45-50, son  from an MI at age 32  -Former smoker quit in the remote past    Is here for evaluation of chest pain.  Patient was told that her cancer is close to her heart and pushing on the heart.  Has recurrent chest pain for last 3 years with no aggravating or relieving factors.  Has shortness of breath and dyspnea on exertion class III.  Patient is complaining of fatigue and leg edema with no aggravating or relieving factors..  Patient is currently on Plavix lisinopril hydrochlorothiazide and Crestor.  Patient is under a lot of stress due to family situation with drug abuse of her daughter and son's death and custody of grandchildren.  Patient's arterial blood pressure is 120/80 heart rate 78 O2 sat of 98% on room air.  Labs from 2020 revealed normal CBC and CMP except for elevated glucose at 165..      Assessment:      -Recurrent chest pain  -CAD history of PCI  -Dyspnea on exertion  -Edema  -Hypertension, dyslipidemia  -Metastatic neuroendocrine tumor on XRT and chemo  -Morbid obesity  -Positive family history of premature CAD  -Hyperglycemia rule out diabetes      Recommendations / Plan:        Reviewed EKG results with patient  Advised her to start baby aspirin  We will schedule an echo and stress test.  Patient says she cannot walk due to skeletal reasons, chronic back pain,  deconditioning and dyspnea we will schedule a Lexiscan Cardiolite.  Risk benefits alternatives explained.  We will follow-up after stress testing and consider further evaluation and treatment.  Patient's likelihood of CAD is intermediate to high given her multiple risk factors and established heart disease in the past and inactive functional status.  Reviewed lab results with patient           Diagnosis Plan   1. Unstable angina pectoris (CMS/HCC)  Adult Transthoracic Echo Complete W/ Cont if Necessary Per Protocol    Stress Test With Myocardial Perfusion One Day   2. CAD S/P percutaneous coronary angioplasty  Adult Transthoracic Echo Complete W/ Cont if Necessary Per Protocol    Stress Test With Myocardial Perfusion One Day   3. Type 2 diabetes mellitus without complication, without long-term current use of insulin (CMS/[facility])  Adult Transthoracic Echo Complete W/ Cont if Necessary Per Protocol    Stress Test With Myocardial Perfusion One Day   4. Dyslipidemia  Adult Transthoracic Echo Complete W/ Cont if Necessary Per Protocol    Stress Test With Myocardial Perfusion One Day   5. Essential hypertension  Adult Transthoracic Echo Complete W/ Cont if Necessary Per Protocol    Stress Test With Myocardial Perfusion One Day   6. Morbidly obese (CMS/HCC)  Adult Transthoracic Echo Complete W/ Cont if Necessary Per Protocol    Stress Test With Myocardial Perfusion One Day   7. 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 (CMS/HCC)  Adult Transthoracic Echo Complete W/ Cont if Necessary Per Protocol    Stress Test With Myocardial Perfusion One Day   8. Family history of premature CAD  Adult Transthoracic Echo Complete W/ Cont if Necessary Per Protocol    Stress Test With Myocardial Perfusion One Day   9. Dyspnea on exertion  Adult Transthoracic Echo Complete W/ Cont if Necessary Per Protocol    Stress Test With Myocardial Perfusion One Day   10. Edema leg  Adult Transthoracic Echo Complete W/ Cont if Necessary Per  Protocol    Stress Test With Myocardial Perfusion One Day              Past Medical History:  Past Medical History:   Diagnosis Date   • Anxiety    • CAD (coronary artery disease)    • CHF (congestive heart failure) (CMS/HCC)    • GERD (gastroesophageal reflux disease)    • HLD (hyperlipidemia)    • Hypertension    • Muscle ache     Muscle ache/cramps   • Neuroendocrine carcinoma (CMS/HCC)    • Neuropathy    • Spinal stenosis     Chronic back pain     Past Surgical History:  Past Surgical History:   Procedure Laterality Date   • CARDIAC CATHETERIZATION     • CHOLECYSTECTOMY     • CORONARY STENT PLACEMENT        Allergies:  Allergies   Allergen Reactions   • Ambien [Zolpidem] Mental Status Change     Home Meds:  Current Meds:     Current Outpatient Medications:   •  amoxicillin (AMOXIL) 500 MG capsule, Take 1,000 mg by mouth 2 (Two) Times a Day., Disp: , Rfl:   •  baclofen (LIORESAL) 20 MG tablet, Take 40 mg by mouth Daily., Disp: , Rfl:   •  clopidogrel (PLAVIX) 75 MG tablet, Take 75 mg by mouth Daily., Disp: , Rfl:   •  glipizide (GLUCOTROL) 5 MG tablet, Take 5 mg by mouth Daily., Disp: , Rfl:   •  JANUMET XR  MG tablet, Take 1 tablet by mouth Daily., Disp: , Rfl:   •  lisinopril-hydrochlorothiazide (PRINZIDE,ZESTORETIC) 10-12.5 MG per tablet, , Disp: , Rfl:   •  oxyCODONE-acetaminophen (PERCOCET)  MG per tablet, TAKE ONE TABLET BY MOUTH EVERY 4 TO 6 HOURS AS NEEDED, Disp: , Rfl:   •  rosuvastatin (CRESTOR) 10 MG tablet, , Disp: , Rfl:   •  sucralfate (CARAFATE) 1 g tablet, Take 1 g by mouth 4 (Four) Times a Day., Disp: , Rfl:   •  TRAZODONE HCL PO, Take 100 mg by mouth., Disp: , Rfl:   •  VENTOLIN  (90 Base) MCG/ACT inhaler, Inhale 2 puffs Every 4 (Four) Hours As Needed., Disp: , Rfl:   •  amitriptyline (ELAVIL) 25 MG tablet, Take 25 mg by mouth every night at bedtime., Disp: , Rfl:   •  meloxicam (MOBIC) 7.5 MG tablet, Take 7.5 mg by mouth Daily., Disp: , Rfl:   No current  facility-administered medications for this visit.   Social History:   Social History     Tobacco Use   • Smoking status: Never Smoker   • Smokeless tobacco: Never Used   Substance Use Topics   • Alcohol use: Never     Frequency: Never      Family History:  Family History   Problem Relation Age of Onset   • Lung cancer Mother    • Lung cancer Sister         Review of Systems : Review of Systems   Constitution: Negative for fever and malaise/fatigue.   HENT: Negative for congestion and hearing loss.    Eyes: Negative for double vision and visual disturbance.   Cardiovascular: Positive for chest pain and dyspnea on exertion. Negative for claudication, leg swelling and syncope.   Respiratory: Positive for shortness of breath. Negative for cough.    Endocrine: Negative for cold intolerance.   Skin: Negative for color change and rash.   Musculoskeletal: Negative for arthritis and joint pain.   Gastrointestinal: Negative for abdominal pain and heartburn.   Genitourinary: Negative for hematuria.   Neurological: Positive for dizziness. Negative for excessive daytime sleepiness.   Psychiatric/Behavioral: Negative for depression. The patient is not nervous/anxious.    All other systems reviewed and are negative.              Constitutional:  Temp:  [98.2 °F (36.8 °C)] 98.2 °F (36.8 °C)  Heart Rate:  [78-99] 78  Resp:  [18] 18  BP: (120-132)/(75-80) 120/80    Physical Exam   /80   Pulse 78   Wt 93 kg (205 lb)   SpO2 97%   BMI 38.73 kg/m²   Physical Exam  General:  Appears in no acute distress, morbidly obese  Eyes: Sclera is anicteric,  conjunctiva is clear   HEENT:  No JVD. Thyroid not visibly enlarged. No mucosal pallor or cyanosis  Respiratory: Respirations regular and unlabored at rest.  Bilaterally good breath sounds, with good air entry in all fields. No crackles, rubs or wheezes auscultated  Cardiovascular: S1,S2 Regular rate and rhythm. No murmur, rub or gallop auscultated.  Chest wall tender to  palpation.  Gastrointestinal: Abdomen soft, flat, non tender. Bowel sounds present.   Musculoskeletal:  No abnormal movements  Extremities: No digital clubbing or cyanosis  Skin: Color pink. Skin warm and dry to touch. No rashes  No xanthoma  Neuro: Alert and awake, no lateralizing deficits appreciated    Cardiographics  ECG: EKG tracing was  personally reviewed by me    ECG 12 Lead  Date/Time: 8/3/2020 5:33 PM  Performed by: Niko Vazquez MD  Authorized by: Niko Vazquez MD   Comparison: not compared with previous ECG   Previous ECG: no previous ECG available  Comments: EKG done today reviewed by me shows sinus rhythm at the rate of 87 bpm with no acute ST-T changes.  No old EKG available for comparison.                   Imaging  Chest X-ray:   Imaging Results (Last 24 Hours)     ** No results found for the last 24 hours. **          Lab Review: I have reviewed the labs          Results from last 7 days   Lab Units 08/03/20  1351   SODIUM mmol/L 140   POTASSIUM mmol/L 4.1   CREATININE mg/dL 0.79   CALCIUM mg/dL 10.4     @LABRCNTIPbnp@  Results from last 7 days   Lab Units 08/03/20  1351   WBC 10*3/mm3 5.72   HEMOGLOBIN g/dL 12.7   HEMATOCRIT % 37.9   PLATELETS 10*3/mm3 124*                 Niko Vazquez MD  8/3/2020, 17:31      EMR Dragon/Transcription:   Dictated utilizing Dragon dictation

## 2020-09-17 NOTE — TELEPHONE ENCOUNTER
Spoke with Christiane. She would like if we call in 2 weeks to r/s. Her grandson passed away last week and has alot to deal with at this time.
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
## 2020-10-12 NOTE — TELEPHONE ENCOUNTER
Spoke with patient informing her of scan and appointment with Dr. Farooq 10/14/2020. Patient had verbal understanding and said she would try and make it to these appointments.
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
## 2020-10-13 NOTE — PROGRESS NOTES
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 female who was diagnosed with pulmonary nodules on the left side in 2015.  December 2017 PET/CT showed a dominant left sided lingular mass.  It was biopsied 1/2018 and positive for synaptophysin and focally positive for chromogranin.  On 2/15/2018, through  [facility] she has a PET/CT DOTA-ZAMBRANO Ga-68 which showed uptake within the mid body of the pancreas and bilateral pulmonary nodules, including a 3.5 cm left lower lobe mass consistent with metastatic disease.  She was seen by Dr. Ivis Manrique on 02/20/2018  And started on Lanreotide monthly.  Per med/onc charting, she has been non-adherent with her appointments secondary to travel/social issues and was transferred care to [facility] 03/19/2020.  She did have PET/CT 1/31/2020 at [facility]: 1.2 x 1.5 cm right pulmonary nodule, SUV 1.97; multiple tiny and small metastatic lesions scattered elsewhere throughout the right lung, non-cavitary pulmonary nodules identified in the lingula and left lower lobe with SUV of 6, mass size 4.2 x 3.5 x 4 cm. Enlarged 1.7x 1.7 x 1.5 cm metabolic adenpathy in the left side of the mediastinum, SUV 4.42. Pulmonary ligament 5.7 x 7 cm mass, stable metastatic adenopathy in the subccarinal distribution 3.25 x 2.5 cm, SUV 3.29.  She was reinitiated on Lanreotide 120 mg on 04/08/2020.     She began having chest pain and repeat CT CAP was ordered on 06/10/2020 and It noted increase in size of the masses in the lungs with compression of pulmonary vasculature and cardiac abutment.      · Ms. Heck was diagnosed with pulmonary nodules dominant on the left side in 2015.  · December 2017 patient underwent PET CT scan which showed dominant lingular  mass that was seen on the prior chest CT on January 8, 2018 which was hypermetabolic consistent with malignancy.  · January 2018 left-sided lung nodule was biopsied and was positive for synaptophysin and focally positive for chromogranin.  · 2/15/2018 PET/CT DOTA-ZAMBRANO revealed focal gallium-68 dotatate uptake within the mid body of the pancreas and bilateral pulmonary nodules including a 3.5 cm left lower lobe more consistent with metastatic disease.  · 2/20/2018 patient was initiated on lanreotide monthly at the [facility] under the care of Dr. Ivis Manrique  · Patient has been noncompliant with her appointments secondary to travel issues and social issues and patient was sent to the [facility] as a transfer from [facility] and seen initially on 3/19/2020.  · 1/31/2020 patient underwent PET CT scan for follow-up of CT scan of the chest that was done at [facility].  PET/CT was done at the [facility] which revealed an nodule in the thyroid gland in the left lobe with an SUV of 2.3 of 2 x 1.5 x 2.5 cm.  In the chest and the right upper lobe pulmonary nodule 1.2 x 1 x 1.5 cm with activity of 1.97.  There are multiple additional tiny and small pulmonary metastatic lesions scattered elsewhere throughout the right lung.  There are areas of non-cavitary pulmonary nodularity identified in the lingula and the left lower lobe are also mild and moderately hypermetabolic with a maximum SUV of 6 which corresponds to the 4.25 x 3.5 x 4 cm pulmonary mass.  There are other tiny non-cavitary lesions in the left upper lobe.  Enlarged mildly metabolic adenopathy left side of the mediastinum 1.75 x 1.75 x 1.5 cm with SUV of 4.42.  Metastatic lymph node at the left.  Pulmonary ligament within a severe 5.83 and size of 5.75 x 4.5 x 7 cm.  Stable metastatic adenopathy in the subcarinal distribution 3.25 x 2.25 x 2.5 cm with an SUV of 3.29.  Abdomen and pelvis scan was  benign.  · 4/8/20/2020-patient reinitiated on lanreotide 120 mg to be given every 4 weeks  · 4/15/2020: Urine 5 HIAA 24-hour 3.5 normal.  24-hour.  · 6/10/2020 CT soft tissue neck chest abdomen and pelvis with contrast were obtained, most of the disease was stable with mild increase in size of the masses in the lungs.  · 6/3/2020: CEA 4.8, WBC 8.4, hemoglobin 13.1, MCV 88.4, platelets 151, creatinine 0.84, LFTs normal, NSE 4.9 normal,  · 7/1/2020-7/15/2020: Patient received total of 3000 cGy, 300 cGy per fraction.  · 8/3/2020: WBC 5.7, hemoglobin 12.7, platelet count 124 low, MCV 87.5, LFTs normal  · 8/3/2020: Patient received Somatuline- lanreotide 120 mg.  · 10/14/2020: CT chest abdomen pelvis: Slight improvement with decrease size of subcarinal mass and decrease size of masslike consolidations in the lingula.  Other nodular densities in the lungs are unchanged.  Findings consistent with metastatic disease.  No evidence of any metastatic disease in the abdomen or pelvis.  Mild fatty infiltration of the liver.  · 10/14/2020: Patient care transferred to Dr. Farooq    SUBJECTIVE:      Patient is my office for follow-up of her neuroendocrine tumor following CT scans.  Discussed the CT scan results which show improvement.  This is the first time I am seeing her.  Reports shortness of air.  Also continues to report chest pain which I feel appears to be musculoskeletal.  Patient does not want to take pain medication secondary to her responsibilities at home. .  She continues to have significant shortness of air.  Patient does have significant social issues which makes it very hard for her to travel to the appointments.   Also continues to have symptoms of chest pain on and off and does not see any cardiologist currently.      Past Medical History:   Diagnosis Date   • Anxiety    • CAD (coronary artery disease)    • CHF (congestive heart failure) (CMS/HCC)    • GERD (gastroesophageal reflux disease)    • HLD  (hyperlipidemia)    • Hypertension    • Muscle ache     Muscle ache/cramps   • Neuroendocrine carcinoma (CMS/HCC)    • Neuropathy    • Spinal stenosis     Chronic back pain       Past Surgical History:   Procedure Laterality Date   • CARDIAC CATHETERIZATION     • CHOLECYSTECTOMY     • CORONARY STENT PLACEMENT           Current Outpatient Medications:   •  clopidogrel (PLAVIX) 75 MG tablet, Take 75 mg by mouth Daily., Disp: , Rfl:   •  lisinopril-hydrochlorothiazide (PRINZIDE,ZESTORETIC) 10-12.5 MG per tablet, , Disp: , Rfl:   •  oxyCODONE-acetaminophen (PERCOCET)  MG per tablet, TAKE ONE TABLET BY MOUTH EVERY 4 TO 6 HOURS AS NEEDED, Disp: , Rfl:   •  potassium chloride (K-DUR,KLOR-CON) 20 MEQ CR tablet, Take 20 mEq by mouth Daily., Disp: , Rfl:   •  amitriptyline (ELAVIL) 25 MG tablet, Take 25 mg by mouth every night at bedtime., Disp: , Rfl:   •  baclofen (LIORESAL) 20 MG tablet, Take 40 mg by mouth Daily., Disp: , Rfl:   •  budesonide-formoterol (SYMBICORT) 80-4.5 MCG/ACT inhaler, Inhale 2 puffs 2 (Two) Times a Day., Disp: 6.9 g, Rfl: 12  •  glipizide (GLUCOTROL) 5 MG tablet, Take 5 mg by mouth Daily., Disp: , Rfl:   •  JANUMET XR  MG tablet, Take 1 tablet by mouth Daily., Disp: , Rfl:   •  meloxicam (MOBIC) 7.5 MG tablet, Take 7.5 mg by mouth Daily., Disp: , Rfl:   •  rosuvastatin (CRESTOR) 10 MG tablet, , Disp: , Rfl:   •  sucralfate (CARAFATE) 1 g tablet, Take 1 g by mouth 4 (Four) Times a Day., Disp: , Rfl:   •  TRAZODONE HCL PO, Take 100 mg by mouth., Disp: , Rfl:   •  VENTOLIN  (90 Base) MCG/ACT inhaler, Inhale 2 puffs Every 4 (Four) Hours As Needed., Disp: , Rfl:     Allergies   Allergen Reactions   • Ambien [Zolpidem] Mental Status Change       Family History   Problem Relation Age of Onset   • Lung cancer Mother    • Lung cancer Sister        Cancer-related family history includes Lung cancer in her mother and sister.    Social History     Tobacco Use   • Smoking status: Never Smoker  "  • Smokeless tobacco: Never Used   Substance Use Topics   • Alcohol use: Never     Frequency: Never   • Drug use: Never       I have reviewed the history of present illness, past medical history, family history, social history, lab results, all notes and other records since the patient was last seen at the cancer care center.        ROS:      Review of Systems   Constitutional: Positive for fatigue. Negative for activity change, appetite change and fever.   HENT: Negative for nosebleeds and trouble swallowing.    Eyes: Negative for visual disturbance.   Respiratory: Positive for shortness of breath and wheezing. Negative for cough.    Cardiovascular: Positive for chest pain.        Midsternal.  It appears positional, with tenderness.   Gastrointestinal: Negative for abdominal pain and blood in stool.   Endocrine: Negative for cold intolerance.   Genitourinary: Negative for dysuria and hematuria.   Musculoskeletal: Negative for joint swelling.   Skin: Negative for rash.   Allergic/Immunologic: Negative for environmental allergies.   Neurological: Negative for seizures.   Hematological: Does not bruise/bleed easily.   Psychiatric/Behavioral: Positive for dysphoric mood. The patient is nervous/anxious (.bhesig).      MD performed ROS and are negative except as mentioned in Subjective.    Objective:     Resp 16   Ht 154.9 cm (61\")   Wt 93 kg (205 lb)   LMP  (LMP Unknown)   Breastfeeding No   BMI 38.73 kg/m²     Vitals:    10/14/20 1502   Resp: 16   Weight: 93 kg (205 lb)   Height: 154.9 cm (61\")     Temperature 97.3, pulse is 89, respirations 18 blood pressures 129/76    PHYSICAL EXAM:      Physical Exam   Constitutional: She is oriented to person, place, and time. No distress.   Moderately built morbidly obese   HENT:   Head: Normocephalic and atraumatic.   Eyes: Conjunctivae are normal. Right eye exhibits no discharge. Left eye exhibits no discharge. No scleral icterus.   Neck: Normal range of motion. Neck " supple. No thyromegaly present.   Cardiovascular: Normal rate, regular rhythm, normal heart sounds and normal pulses. Exam reveals no gallop and no friction rub.   Pulmonary/Chest: Effort normal. No stridor. No respiratory distress. She has no wheezes.   Distant breath sounds bilaterally.  Tenderness on palpation of the midsternal region..  Has chest pain.   Abdominal: Soft. Bowel sounds are normal. She exhibits no mass. There is no abdominal tenderness. There is no rebound and no guarding.   Musculoskeletal: Normal range of motion. No tenderness.      Comments: 1+ lower extremity edema   Lymphadenopathy:     She has no cervical adenopathy.   Neurological: She is alert and oriented to person, place, and time. She exhibits normal muscle tone.   Skin: Skin is warm. No rash noted. She is not diaphoretic. No erythema.   Psychiatric: Her behavior is normal.   Nursing note and vitals reviewed.     Physical exam done by MD.    RECENT LABS:       Lab Results - Last 18 Months   Lab Units 08/03/20  1351 06/30/20  1516 06/03/20  1126   WBC 10*3/mm3 5.72 8.96 8.44   HEMOGLOBIN g/dL 12.7 13.3 13.1   HEMATOCRIT % 37.9 40.6 40.5   PLATELETS 10*3/mm3 124* 140 151   MCV fL 87.5 89.4 88.4     Lab Results - Last 18 Months   Lab Units 10/14/20  1355 08/03/20  1351 06/30/20  1516 06/03/20  1126   SODIUM mmol/L  --  140 141 142   POTASSIUM mmol/L  --  4.1 3.9 3.5   CHLORIDE mmol/L  --  103 103 106   CO2 mmol/L  --  23.0 23.0 23.0   BUN   --  19 17 15   CREATININE mg/dL 0.60 0.79 0.79 0.84   CALCIUM mg/dL  --  10.4 9.9 10.2   BILIRUBIN mg/dL  --  0.7 0.5 0.7   ALK PHOS U/L  --  53 63 59   ALT (SGPT) U/L  --  20 23 15   AST (SGOT) U/L  --  25 27 19   GLUCOSE mg/dL  --  177* 165* 239*       Lab Results   Component Value Date    GLUCOSE 177 (H) 08/03/2020    BUN  08/03/2020      Comment:      Testing performed by alternate method    BUN 19 08/03/2020    CREATININE 0.60 10/14/2020    EGFRIFNONA 72 08/03/2020    BCR  08/03/2020      Comment:       Testing not performed    K 4.1 08/03/2020    CO2 23.0 08/03/2020    CALCIUM 10.4 08/03/2020    ALBUMIN 4.10 08/03/2020    AST 25 08/03/2020    ALT 20 08/03/2020     No results found for: IRON, TIBC, FERRITIN  No results found for: FOLATE  No results found for: OCCULTBLD  No results found for: RETICCTPCT  No results found for: QGXJXDFZ91, TSH  No results found for: SPEP, UPEP  No results found for: LDH, URICACID  No results found for: PEARL, RF, SEDRATE  No results found for: FIBRINOGEN, HAPTOGLOBIN, DDIMER  No results found for: DDIMER  No results found for: PTT, INR  No results found for:   Lab Results   Component Value Date    CEA 4.84 06/03/2020     No results found for:   No results found for: PSA  No results found for: DI4238    Assessment/Plan      ASSESSMENT:     1. Metastatic pancreatic neuroendocrine tumor: CT scan chest abdomen pelvis 10/14/2020 shows improvement with a decrease in the size of the subcarinal mass and decrease size of masslike consolidations in the lingula.  2. Lung metastatic disease: Lesion had cardiac abutment.  Status post radiation therapy.  30 Gy in 10 fractions.  Improvement after radiation.  3. Chest pain/tenderness: Patient continues to have symptoms, despite radiation therapy.  Chest pain is aggravated by movement.  She has very intense tenderness on palpation of the midsternal region.  I wonder if this is costochondritis.  4. Thyroid nodule  5. Coronary artery disease and CHF  6. Type 2 diabetes  7. Medical noncompliance: Due to her social issues.  8. Dyspnea: Could be COPD  9. Insomnia  10. Left jaw pain  11. ECOG 2  12. Psychosocial: Patient has several challenges at her home.  Her daughter has substance abuse.  Patient is taking care of her 3 grandchildren.    PLAN:      Patient seen for the first time.  Entire chart reviewed.    13. She is missing her lanreotide injections.    Doses 120 mcg that will be administered every 4 weeks.  Advised patient compliance with  the treatment.  We will schedule this.  14. Status post radiation therapy to the left symptomatic lung lesion.  CT scan shows response.  15. For symptoms of persistent chest pain, cardiology consultation with Dr. Gutierrez.  She would benefit from cardiac testing to rule out angina/ ischemic heart disease.  I discussed with the patient that her chest pain could be somatic in nature.  Dr. Ma will see her at [facility], [place] which is close to her.  16. medical noncompliance mainly related to transportation.    Our  is on board.  17. Persistent shortness of air: She has signs of bronchospasm.  Will prescribe Symbicort.  18. We will follow-up with her in 6 weeks    I have reviewed labs results, imaging, vitals, and medications with the patient today.   Patient verbalized understanding and is in agreement of the above plan.    I have reviewed and confirmed the accuracy of the patient's history: Chief complaint, HPI, ROS and Subjective as entered by the MA/LPN/RN. Mason Farooq MD 10/18/20         Electronically signed by Mason Farooq MD, 10/18/20, 4:14 PM EDT.

## 2020-10-19 NOTE — PROGRESS NOTES
"Cardiology Office Consultation      Encounter Date:  10/19/2020    Patient ID:   Christiane Heck is a 70 y.o. female.    Reason For Consultation:    Chest pain  Dizziness  Shortness of breath    History of Present Illness:  Dear Gloria Mann APRN    I had the pleasure of seeing Christiane Heck today. As you are well aware, this is a 70 y.o. female with a past medical history of chest discomfort prior PCI and stenting and known coronary artery disease presented with symptoms of chest discomfort that is progressively getting worse  Multiple episodes of chest discomfort  Frequent use of sublingual nitroglycerin  Symptoms are progressively getting worse  Limited functional capacity  Also complaining of some dizziness and low blood pressures  Repeat blood pressure in the office systolic 100  Assessment & Plan    Impressions:  -CAD history of CAPRICE to RCA 2015 details of which are not available  -Hypertension  -Dyslipidemia  -Spinal stenosis, GERD  -Metastatic neuroendocrine carcinoma, currently undergoing XRT and chemo  -Allergy/intolerance to Ambien  -Family history of premature CAD with father having MI in at age 45-50, son  from an MI at age 32  -Former smoker quit in the remote past    Recommendations:  Decrease the dose of lisinopril hydrochlorothiazide to half a pill once a day  Prior evaluation work-up reviewed and discussed with the patient  Recommend a cardiac catheterization for definitive diagnosis and treatment options were worsening anginal chest discomfort  I do not believe that stress test is going to answer the question at this time with recurrent episodes of chest discomfort requiring multiple doses of nitroglycerin  Close monitoring of blood pressure and heart rate at home  Further recommendations based on patient course    Objective:    Vitals:  Vitals:    10/19/20 1436   BP: 90/63   BP Location: Left arm   Pulse: 65   Resp: 18   SpO2: 95%   Weight: 93 kg (205 lb)   Height: 154.9 cm (61\") "       Physical Exam:    General: Alert, cooperative, no distress, appears stated age  Head:  Normocephalic, atraumatic, mucous membranes moist  Eyes:  Conjunctiva/corneas clear, EOM's intact     Neck:  Supple,  no adenopathy;      Lungs: Clear to auscultation bilaterally, no wheezes rhonchi rales are noted  Chest wall: No tenderness  Heart::  Regular rate and rhythm, S1 and S2 normal, no murmur, rub or gallop  Abdomen: Soft, non-tender, nondistended bowel sounds active  Extremities: No cyanosis, clubbing, or edema  Pulses: 2+ and symmetric all extremities  Skin:  No rashes or lesions  Neuro/psych: A&O x3. CN II through XII are grossly intact with appropriate affect    History of Present Illness      Allergies:  Allergies   Allergen Reactions   • Ambien [Zolpidem] Mental Status Change       Medication Review:     Current Outpatient Medications:   •  amitriptyline (ELAVIL) 25 MG tablet, Take 25 mg by mouth every night at bedtime., Disp: , Rfl:   •  baclofen (LIORESAL) 20 MG tablet, Take 40 mg by mouth Daily., Disp: , Rfl:   •  budesonide-formoterol (SYMBICORT) 80-4.5 MCG/ACT inhaler, Inhale 2 puffs 2 (Two) Times a Day., Disp: 6.9 g, Rfl: 12  •  clopidogrel (PLAVIX) 75 MG tablet, Take 75 mg by mouth Daily., Disp: , Rfl:   •  glipizide (GLUCOTROL) 5 MG tablet, Take 5 mg by mouth Daily., Disp: , Rfl:   •  JANUMET XR  MG tablet, Take 1 tablet by mouth Daily., Disp: , Rfl:   •  lisinopril-hydrochlorothiazide (PRINZIDE,ZESTORETIC) 10-12.5 MG per tablet, , Disp: , Rfl:   •  meloxicam (MOBIC) 7.5 MG tablet, Take 7.5 mg by mouth Daily., Disp: , Rfl:   •  oxyCODONE-acetaminophen (PERCOCET)  MG per tablet, TAKE ONE TABLET BY MOUTH EVERY 4 TO 6 HOURS AS NEEDED, Disp: , Rfl:   •  potassium chloride (K-DUR,KLOR-CON) 20 MEQ CR tablet, Take 20 mEq by mouth Daily., Disp: , Rfl:   •  rosuvastatin (CRESTOR) 10 MG tablet, , Disp: , Rfl:   •  sucralfate (CARAFATE) 1 g tablet, Take 1 g by mouth 4 (Four) Times a Day., Disp: ,  Rfl:   •  TRAZODONE HCL PO, Take 100 mg by mouth., Disp: , Rfl:   •  VENTOLIN  (90 Base) MCG/ACT inhaler, Inhale 2 puffs Every 4 (Four) Hours As Needed., Disp: , Rfl:     Family History:  Family History   Problem Relation Age of Onset   • Lung cancer Mother    • Lung cancer Sister        Past Medical History:  Past Medical History:   Diagnosis Date   • Anxiety    • CAD (coronary artery disease)    • CHF (congestive heart failure) (CMS/HCC)    • GERD (gastroesophageal reflux disease)    • HLD (hyperlipidemia)    • Hypertension    • Muscle ache     Muscle ache/cramps   • Neuroendocrine carcinoma (CMS/HCC)    • Neuropathy    • Spinal stenosis     Chronic back pain       Past surgical History:  Past Surgical History:   Procedure Laterality Date   • CARDIAC CATHETERIZATION     • CHOLECYSTECTOMY     • CORONARY STENT PLACEMENT         Social History:  Social History     Socioeconomic History   • Marital status: Single     Spouse name: Not on file   • Number of children: Not on file   • Years of education: Not on file   • Highest education level: Not on file   Tobacco Use   • Smoking status: Never Smoker   • Smokeless tobacco: Never Used   Substance and Sexual Activity   • Alcohol use: Never     Frequency: Never   • Drug use: Never       Review of Systems:  The following systems were reviewed as they relate to the cardiovascular system: Constitutional, Eyes, ENT, Cardiovascular, Respiratory, Gastrointestinal, Integumentary, Neurological, Psychiatric, Hematologic, Endocrine, Musculoskeletal, and Genitourinary. The pertinent cardiovascular findings are reported above with all other cardiovascular points within those systems being negative.    Diagnostic Study Review:     Current Electrocardiogram:  Procedures        NOTE: The following portions of the patient's history were reviewed and updated this visit as appropriate: allergies, current medications, past family history, past medical history, past social history,  past surgical history and problem list.

## 2020-10-22 NOTE — TELEPHONE ENCOUNTER
Christiane called stating that she seen Dr. Farooq on 10/14 and he was going to send a prescription in for Symbicort. Pt didn't answer but I left her a voicemail stating that Symbicort inhaler was called into her pharmacy on 10/18. I advised her to contact pharmacy to see if prescription is ready if not have them call office.

## 2020-11-02 NOTE — TELEPHONE ENCOUNTER
Case Management/ Note    Patient Name: Christiane Heck  YOB: 1950  MRN #: [id number]    OSW received a call from patient who is alert and oriented to person, place and time. She verbalized being overwhelmed with appointments and asked for clarification for two upcoming appointments, here with Dr. Farooq and with her cardiologist Dr. Gutierrez. These appt dates and times were given to her. She said she thought Dr. Gutierrez was to prescribe two inhalers for her but she was not sure. She had other medications that she said her NP prescribed for her that she was not sure about either. She was encouraged to call her NP, Gloria Barron to assure she is taking the correct medications. OSW called and spoke with LEA Nicole at Dr. Gutierrez office to let her know that Christiane is confused about medications that he may or may not have prescribed. She gave v/u. OSW called Gloria Barron's office and left a message with their triage line requesting call back. Christiane was provided with supportive care. OSW will remain available.     Electronically signed by:   Cathy Schwartz LCSW, OSW-C  11/02/20, 16:06 EST

## 2020-11-03 NOTE — TELEPHONE ENCOUNTER
Case Management/ Note    Patient Name: Christiane Heck  YOB: 1950  MRN #: [id number]    OSW received a returned call from Maria Teresa at Gloria Barron NP's office. She was told of Christiane's questions regarding her medication. She said she would follow up with the patient.     Electronically signed by:   Cathy Schwartz LCSW, OSW-C  11/03/20, 12:27 EST

## 2020-11-04 PROBLEM — I20.0 UNSTABLE ANGINA PECTORIS (HCC): Status: ACTIVE | Noted: 2020-01-01

## 2020-11-04 PROBLEM — I10 ESSENTIAL HYPERTENSION: Status: ACTIVE | Noted: 2020-01-01

## 2020-11-04 PROBLEM — Z98.61 CAD S/P PERCUTANEOUS CORONARY ANGIOPLASTY: Status: ACTIVE | Noted: 2020-01-01

## 2020-11-04 PROBLEM — I25.10 CAD S/P PERCUTANEOUS CORONARY ANGIOPLASTY: Status: ACTIVE | Noted: 2020-01-01

## 2020-11-04 PROBLEM — R06.09 DYSPNEA ON EXERTION: Status: ACTIVE | Noted: 2020-01-01

## 2020-11-04 NOTE — TELEPHONE ENCOUNTER
Called and spoke to the Pt after receiving a phone call that the Pt was confused about a possible inhaler that Dr. Ma sent in. When I called the Pt she stated she was very overwhelmed with all the doctors she was seeing and raising her 3 grandkids.  I informed the Pt after speaking to Dr. Ma that he didn't send in the Pt an inhaler and that he had decreased her BP pill to 1/2 tablet po daily. The Pt stated understanding. She then started to describe the doctors appearance to me that prescribed the inhaler and she believes Dr. Serna may have sent in the inhaler. Dr. Ma wanted the Pt to have a cardiac cath done and the Pt is agreeable to having the cardiac cath and would like to get it scheduled. I informed the Pt that I would speak to Adrian, our surgery scheduler, and call her back.     I then called and spoke to Adrian and she is going to call the Pt today to have the cardiac cath scheduled for next week.     I then called the Pt back and informed her that Adiran would be calling her today to schedule the cardiac cath for next week if that would work for her.  The Pt stated understanding and was very eager to have the cardiac cath scheduled. The Pt seemed very overwhelmed when I first spoke to her, but by the end of the conversation she seemed much more calm and relaxed. I informed the Pt to please call us with any questions or concerns.

## 2020-11-12 NOTE — DISCHARGE INSTRUCTIONS
Post Cath Instructions    Call Dr. Gutierrez’s office to schedule a follow up appointment in 4 weeks at [phone].      1) Drink plenty of fluids for the next 24 hours.  This helps to eliminate the dye used in your procedure through urination.  You may resume a normal diet; however, try to avoid foods that would cause gas or constipation.    2) Sedative medication given to you during your catheterization may decrease your judgement and reaction time for up to 24-48 hours.  Therefore:  a. DO NOT drive or operate hazardous machinery (48 hours)  b. DO NOT consume alcoholic beverages  c. DO NOT make any important/legal decisions  d. Have someone stay with you for at least 24 hours    3) To allow proper healing and prevent bleeding, the following activities are to be strictly avoided for the next 24-48 hours:  a. Excessive bending at wound site  b. Straining (anything that would tense up muscles around the affected puncture site)  c. Lifting objects greater than 5 pounds, pushing, or pulling for 5 days  i. For Arm Cases:  1. No flexing at the puncture site, such as hammering, golfing, bowling, or swinging any objects    4) Keep the puncture site clean and dry.  You may remove the dressing tomorrow and replace it with a band-aid for at least one additional day.  Gently clean the site with mild soap and water.  No scrubbing/rubbing and lightly pat the area dry.  Showers are acceptable; however, avoid submerging in water (tub baths, hot tubs, swimming pools, dishwater, etc…) for at least one week.  The site should be completely healed before resuming these activities to reduce the risk of infection.  Check the site often.  Watch for signs and symptoms of infection and notify your physician if any of the following occur:  a. Bleeding or an increase in swelling at the puncture site  b. Fever  c. Increased soreness around puncture site  d. Foul odor or significant drainage from the puncture site  e. Swelling, redness, or  warmth at the puncture site    **A bruise or small “pea sized” lump under the skin at the puncture site is not unusual.  This should disappear within 3-4 weeks.**  5) CONTACT YOUR PHYSICIAN OR CALL 911 IF YOU EXPERIENCE ANY OF THE FOLLOWING:  a. Increased angina (chest pain) or frequent sensations of pressure, burning, pain, or other discomfort in the chest, arm, jaws, or stomach  b. Lightheadedness, dizziness, faint feeling, sweating, or difficulty breathing  c. Odd sensation changes like numbness, tingling, coldness, or pain in the arm or leg in which the catheter was inserted  d. Limb in which the catheter was inserted becomes pale/bluish in color    IMPORTANT:  Although this occurs very rarely, if you should develop bright red or excessive bleeding, feel a “pop” inside at the insertion site, or notice a sudden increase in swelling larger than a walnut, you should call 911.  Hold continuous firm pressure to the access site until emergency personnel arrive.  It is best if someone else can do this for you.

## 2020-11-16 NOTE — PROGRESS NOTES
Cardiology Office Visit      Encounter Date:  2020    Patient ID:   Christiane Heck is a 70 y.o. female.    Reason For Consultation:    Chest pain  Dizziness  Shortness of breath    History of Present Illness:  Dear Gloria Mann APRN    I had the pleasure of seeing Christiane Heck today. As you are well aware, this is a 70 y.o. female with a past medical history of chest discomfort prior PCI and stenting and known coronary artery disease presented with symptoms of chest discomfort that is progressively getting worse  Multiple episodes of chest discomfort  Frequent use of sublingual nitroglycerin  Symptoms are progressively getting worse  Limited functional capacity    Impressions:  Moderate obstructive disease involving the mid LAD plan to manage medically at this time  Patent stent in the mid right coronary artery with mild in-stent restenosis  Normal LV systolic function/Normal wall motion  Normal left-sided filling pressures  Left ventricular end-diastolic pressure was 12 mmHg  LV ejection fraction of 60%           Assessment & Plan    Impressions:  -CAD history of CAPRICE to RCA 2015 details of which are not available  -Hypertension  -Dyslipidemia  -Spinal stenosis, GERD  -Metastatic neuroendocrine carcinoma, currently undergoing XRT and chemo  -Allergy/intolerance to Ambien  -Family history of premature CAD with father having MI in at age 45-50, son  from an MI at age 32  -Former smoker quit in the remote past    Recent cardiac catheterization with a patent stent in the right coronary artery and only moderate obstructive disease involving the LAD normal left-sided filling pressures and normal LV systolic function      Recommendations:  Medical management for obstructive coronary artery disease  Continued aggressive risk factor modification  No significant obstructive coronary artery disease to explain patient's symptoms of resting chest pain and resting shortness of breath  Most likely chest  "pain is musculoskeletal secondary to underlying metastatic tumor  Most likely is the shortness of breath is secondary to underlying lymphadenopathy and pulmonary infiltrates from the metastatic tumor  Results of the cardiac catheterization reviewed and discussed with the patient and family  Follow up in office in 6 months    Objective:    Vitals:  Vitals:    11/16/20 1455   BP: 144/73   BP Location: Left arm   Pulse: 77   Resp: 18   SpO2: 93%   Weight: 87.5 kg (193 lb)   Height: 154.9 cm (61\")       Physical Exam:    General: Alert, cooperative, no distress, appears stated age  Head:  Normocephalic, atraumatic, mucous membranes moist  Eyes:  Conjunctiva/corneas clear, EOM's intact     Neck:  Supple,  no adenopathy;      Lungs: Clear to auscultation bilaterally, no wheezes rhonchi rales are noted  Chest wall: No tenderness/chest wall tenderness with palpation  Heart::  Regular rate and rhythm, S1 and S2 normal, no murmur, rub or gallop  Abdomen: Soft, non-tender, nondistended bowel sounds active  Extremities: No cyanosis, clubbing, or edema  Pulses: 2+ and symmetric all extremities  Skin:  No rashes or lesions  Neuro/psych: A&O x3. CN II through XII are grossly intact with appropriate affect      Allergies:  Allergies   Allergen Reactions   • Ambien [Zolpidem] Mental Status Change       Medication Review:     Current Outpatient Medications:   •  amitriptyline (ELAVIL) 25 MG tablet, Take 25 mg by mouth every night at bedtime., Disp: , Rfl:   •  baclofen (LIORESAL) 20 MG tablet, Take 40 mg by mouth Daily., Disp: , Rfl:   •  budesonide-formoterol (SYMBICORT) 80-4.5 MCG/ACT inhaler, Inhale 2 puffs 2 (Two) Times a Day., Disp: 6.9 g, Rfl: 12  •  clopidogrel (PLAVIX) 75 MG tablet, Take 75 mg by mouth Daily., Disp: , Rfl:   •  glipizide (GLUCOTROL) 5 MG tablet, Take 5 mg by mouth Daily., Disp: , Rfl:   •  JANUMET XR  MG tablet, Take 1 tablet by mouth Daily., Disp: , Rfl:   •  lisinopril-hydrochlorothiazide " (PRINZIDE,ZESTORETIC) 10-12.5 MG per tablet, Take 1 tablet by mouth Daily., Disp: , Rfl:   •  meloxicam (MOBIC) 7.5 MG tablet, Take 7.5 mg by mouth Daily., Disp: , Rfl:   •  oxyCODONE-acetaminophen (PERCOCET)  MG per tablet, Take 1 tablet by mouth Every 4 (Four) Hours As Needed., Disp: , Rfl:   •  potassium chloride (K-DUR,KLOR-CON) 20 MEQ CR tablet, Take 20 mEq by mouth Daily., Disp: , Rfl:   •  rosuvastatin (CRESTOR) 10 MG tablet, Take 10 mg by mouth Daily., Disp: , Rfl:   •  sucralfate (CARAFATE) 1 g tablet, Take 1 g by mouth 4 (Four) Times a Day., Disp: , Rfl:   •  TRAZODONE HCL PO, Take 100 mg by mouth Every Night., Disp: , Rfl:   •  VENTOLIN  (90 Base) MCG/ACT inhaler, Inhale 2 puffs Every 4 (Four) Hours As Needed., Disp: , Rfl:     Family History:  Family History   Problem Relation Age of Onset   • Lung cancer Mother    • Lung cancer Sister        Past Medical History:  Past Medical History:   Diagnosis Date   • Anxiety    • CAD (coronary artery disease)    • CHF (congestive heart failure) (CMS/HCC)    • Diabetes mellitus (CMS/HCC)    • GERD (gastroesophageal reflux disease)    • HLD (hyperlipidemia)    • Hypertension    • Muscle ache     Muscle ache/cramps   • Neuroendocrine carcinoma (CMS/HCC)    • Neuropathy    • Spinal stenosis     Chronic back pain       Past surgical History:  Past Surgical History:   Procedure Laterality Date   • CARDIAC CATHETERIZATION     • CARDIAC CATHETERIZATION N/A 11/12/2020    Procedure: Left Heart Cath;  Surgeon: Brynn Gutierrez MD;  Location: [facility] INVASIVE LOCATION;  Service: Cardiovascular;  Laterality: N/A;   • CARDIAC CATHETERIZATION N/A 11/12/2020    Procedure: Coronary angiography;  Surgeon: Brynn Gutierrez MD;  Location: [facility] INVASIVE LOCATION;  Service: Cardiovascular;  Laterality: N/A;   • CARDIAC CATHETERIZATION N/A 11/12/2020    Procedure: Left ventriculography;  Surgeon: Brynn Gutierrez MD;  Location: [facility]  INVASIVE LOCATION;  Service: Cardiovascular;  Laterality: N/A;   • CHOLECYSTECTOMY     • CORONARY STENT PLACEMENT         Social History:  Social History     Socioeconomic History   • Marital status: Single     Spouse name: Not on file   • Number of children: Not on file   • Years of education: Not on file   • Highest education level: Not on file   Tobacco Use   • Smoking status: Never Smoker   • Smokeless tobacco: Never Used   Substance and Sexual Activity   • Alcohol use: Never     Frequency: Never   • Drug use: Never       Review of Systems:  The following systems were reviewed as they relate to the cardiovascular system: Constitutional, Eyes, ENT, Cardiovascular, Respiratory, Gastrointestinal, Integumentary, Neurological, Psychiatric, Hematologic, Endocrine, Musculoskeletal, and Genitourinary. The pertinent cardiovascular findings are reported above with all other cardiovascular points within those systems being negative.    Diagnostic Study Review:     Current Electrocardiogram:  Procedures      NOTE: The following portions of the patient's history were reviewed and updated this visit as appropriate: allergies, current medications, past family history, past medical history, past social history, past surgical history and problem list.

## 2020-12-08 NOTE — PROGRESS NOTES
Pt here for Somatulane injection.  She is doing well other than having some fatigue. She denies having any reaction to the injections. Pt was seen by Dr. Serna in radiation therapy today prior to injection. She reports to having some shortness of breath on exertion. Her 02 level while in our office was 94%. I advised her to discuss this with Dr. Barron today at her f/u apt.

## 2020-12-08 NOTE — PROGRESS NOTES
FOLLOW-UP NOTE    Name: Christiane Heck  YOB: 1950  MRN #: [id number]  Date of Service: 12/8/2020  Referring Provider: Mason Farooq MD  Primary Care Provider: Gloria Barron APRN    DIAGNOSIS: Metastatic Neuroendocrine carcinoma, symptomatic L lung/mediastinal disease with cardiac abutment  1. 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 (CMS/HCC)        REASON FOR VISIT: Routine scheduled follow-up for Neuroendocrine carcinoma    RADIATION TREATMENT COURSE: 30 Gy in 10 fractions, completed 07/15/2020.    HISTORY OF PRESENT ILLNESS: The patient is a 70 y.o. year old female who is symptomatic from her neuroendocrine carcinoma.  She has missed recent appt here and has not been adherent to infusion schedule or appts with Med/onc.  It looks like last lanreotide acetate was on 06/30/2020 then 08/30/2020.    She has had interval heart cath and cardiology follow-up.    PCP later today.    She does have some back pain chronically and trouble walking; addressing with PCP.  CT CAP reviewed with her from the study 10/14/2020.    Med/onc is notified and can given her lanreotide today and make her 4 week schedule going forward.    The following portions of the patient's history were reviewed and updated as appropriate: allergies, current medications, past family history, past medical history, past social history, past surgical history and problem list. Reviewed with the patient and remain unchanged.    PAST MEDICAL HISTORY:  she  has a past medical history of Anxiety, CAD (coronary artery disease), CHF (congestive heart failure) (CMS/HCC), Diabetes mellitus (CMS/HCC), GERD (gastroesophageal reflux disease), HLD (hyperlipidemia), Hypertension, Muscle ache, Neuroendocrine carcinoma (CMS/HCC), Neuropathy, and Spinal stenosis.  MEDICATIONS:   Current Outpatient Medications:   •  amitriptyline (ELAVIL) 25 MG tablet, Take 25 mg by mouth every night at bedtime., Disp: , Rfl:   •  baclofen (LIORESAL) 20 MG  tablet, Take 40 mg by mouth Daily., Disp: , Rfl:   •  budesonide-formoterol (SYMBICORT) 80-4.5 MCG/ACT inhaler, Inhale 2 puffs 2 (Two) Times a Day., Disp: 6.9 g, Rfl: 12  •  clopidogrel (PLAVIX) 75 MG tablet, Take 75 mg by mouth Daily., Disp: , Rfl:   •  glipizide (GLUCOTROL) 5 MG tablet, Take 5 mg by mouth Daily., Disp: , Rfl:   •  JANUMET XR  MG tablet, Take 1 tablet by mouth Daily., Disp: , Rfl:   •  lisinopril-hydrochlorothiazide (PRINZIDE,ZESTORETIC) 10-12.5 MG per tablet, Take 1 tablet by mouth Daily., Disp: , Rfl:   •  meloxicam (MOBIC) 7.5 MG tablet, Take 7.5 mg by mouth Daily., Disp: , Rfl:   •  oxyCODONE-acetaminophen (PERCOCET)  MG per tablet, Take 1 tablet by mouth Every 4 (Four) Hours As Needed., Disp: , Rfl:   •  rosuvastatin (CRESTOR) 10 MG tablet, Take 10 mg by mouth Daily., Disp: , Rfl:   •  sucralfate (CARAFATE) 1 g tablet, Take 1 g by mouth 4 (Four) Times a Day., Disp: , Rfl:   •  TRAZODONE HCL PO, Take 100 mg by mouth Every Night., Disp: , Rfl:   •  VENTOLIN  (90 Base) MCG/ACT inhaler, Inhale 2 puffs Every 4 (Four) Hours As Needed., Disp: , Rfl:   •  potassium chloride (K-DUR,KLOR-CON) 20 MEQ CR tablet, Take 20 mEq by mouth Daily., Disp: , Rfl:   ALLERGIES:   Allergies   Allergen Reactions   • Ambien [Zolpidem] Mental Status Change     PAST SURGICAL HISTORY: she has a past surgical history that includes Coronary stent placement; Cholecystectomy; Cardiac catheterization; Cardiac catheterization (N/A, 11/12/2020); Cardiac catheterization (N/A, 11/12/2020); and Cardiac catheterization (N/A, 11/12/2020).  PREVIOUS RADIOTHERAPY OR CHEMOTHERAPY: yes  FAMILY HISTORY: her family history includes Lung cancer in her mother and sister.  SOCIAL HISTORY: she  reports that she has never smoked. She has never used smokeless tobacco. She reports that she does not drink alcohol or use drugs.  PAIN AND PAIN MANAGEMENT: Christiane BRADFORD Maria R reports a pain score of 7.  Given her pain assessment as  "noted, treatment options were discussed and the following options were decided upon as a follow-up plan to address the patient's pain: continuation of current treatment plan for pain. Stressed compliance with lantreotide schedule with patient.    Vitals:    12/08/20 1012   BP: 106/72   Pulse: 99   Temp: 97.3 °F (36.3 °C)   TempSrc: Infrared   SpO2: 92%   Weight: 85.7 kg (189 lb)   Height: 154.9 cm (61\")   PainSc:   7   PainLoc: Chest  Comment: and legs. chest hurts when she cougha     NUTRITIONAL STATUS:    Otherwise no issues  KPS: 60-70  ECOG: (2) Ambulatory and capable of self care, unable to carry out work activity, up and about > 50% or waking hours       Review of Systems:   Review of Systems     Otherwise negative as below.     General: No fevers, chills, weight change, or drenching night sweats. Skin: No rashes or jaundice.  HEENT: No change in vision or hearing, no headaches.  Neck: No dysphagia or masses.  Heme/Lymph: No easy bruising or bleeding.  Respiratory System: As noted above. Cardiovascular: No chest pain, palpitations, or dyspnea on exertion.  - Pacemaker. GI: No nausea, vomiting, diarrhea, melena, or hematochezia.  : No dysuria or hematuria.  Endocrine: No heat or cold intolerance. Musculoskeletal: as noted above.  Neuro: No weakness, numbness, syncope, or seizures. Psych: No mood changes or depression. Ext: Denies swelling.        Objective     Vitals:  Vitals:    12/08/20 1012   BP: 106/72   Pulse: 99   Temp: 97.3 °F (36.3 °C)   TempSrc: Infrared   SpO2: 92%   Weight: 85.7 kg (189 lb)   Height: 154.9 cm (61\")   PainSc:   7   PainLoc: Chest  Comment: and legs. chest hurts when she cougha       PHYSICAL EXAM:  GENERAL: in no apparent distress, sitting comfortably in room.    HEENT: normocephalic, atraumatic. Pupils are equal, round, reactive to light. Sclera anicteric. Conjunctiva not injected. Oropharynx without erythema, ulcerations or thrush.   NECK: Supple with no masses.  LYMPHATIC: no " cervical, supraclavicular or axillary adenopathy appreciated bilaterally.   CARDIOVASCULAR: S1 & S2 detected; no murmurs, rubs or gallops.  CHEST: clear to auscultation bilaterally; no wheezes, crackles or rubs. Work of breathing normal.  ABDOMEN: bowel sounds present. Abdomen is soft, nontender, nondistended.   MUSCULOSKELETAL: no tenderness to palpation along the spine or scapulae. Normal range of motion.  EXTREMITIES: no clubbing, cyanosis, edema.  SKIN: no erythema, rashes, ulcerations noted.   NEUROLOGIC: cranial nerves II-XII grossly intact bilaterally. No focal neurologic deficits.  PSYCHIATRIC:  alert, aware, and appropriate.      PERTINENT IMAGING/PATHOLOGY/LABS (Medical Decision Making):     COORDINATION OF CARE: A copy of this note is sent to the referring provider.    PATHOLOGY (Reviewed):     IMAGING (Reviewed): 1. CT chest 10/14/2020:  FINDINGS:  Hilum and Mediastinum: There are enlarged lymph nodes in the hilum and  mediastinum. Large low paratracheal lymph node measures about 11 mm in  short axis. There is a large mass in the subcarinal location extending  to the surrounding the left mainstem bronchus with mass effect on the  left atrium. This measures 3.4 x 5.9 cm on image #61. When compared to  the previous study it measured approximately 4.1 x 6.2 cm only be  slightly smaller. There is no pericardial effusion.  Pleural lung parenchyma: There is a small left pleural effusion. There  is a mass like consolidation in the lingula posteriorly and inferiorly  measuring 3.8 x 3.2 cm on image #70. Previously this measured about 5.2  x 3.9 cm. Other rounded nodules are present in the left lower lobe. One  measures 2.4 cm x 2.3 cm on image #71. Previously this measured about  2.4 x 2.2 cm. In other more medially in the left lower lobe  Measures 1.6 x 1.6 cm on image 76. Previously this measured about 1.8 x  1.7 cm. There are smaller nodular densities in the left lower lobe there  are some groundglass opacity  in the lingula.  There is a nodule in the superior segment of the right lower lobe  measuring about 8 mm on image #43. This is slightly decreased in size as  measured 9 mm on previous study. There is a nodule in the right upper  lobe centrally on image #38 measures 1.5 cm and is unchanged in size.  There is a right upper lobe nodule measuring 6 mm on image #54 which is  unchanged. Right lower lobe pulmonary nodule on image #79 measures 8 mm.  This is unchanged.  Upper Abdomen: Please see CT the abdomen pelvis performed same date for  findings below the diaphragm.  Soft tissues: Unremarkable.  Osseous structures: No aggressive focal lytic or sclerotic osseous  lesions. Osteopenia.  IMPRESSION:  1.Slight improvement with decreased size of subcarinal mass and  decreased size of masslike consolidations in the lingula. Other nodular  densities in the lungs are unchanged. Findings are consistent with  metastatic disease.  2.Please see CT abdomen pelvis performed same date for findings below  the diaphragm.    CT abd/pelvis on 10/14/2020:  FINDINGS:  Lower Thorax: Please see CT chest performed same date for findings in  the lung bases. Masslike consolidations most pronounced in lingula and  left lower lobe.  Peritoneum: No free air or free fluid.   Appendix: Appendix is well seen and is normal.  Kidneys, ureters, and urinary bladder: No hydronephrosis.  No  nephroureterolithiasis. No focal renal lesions.  Normal appearance of  urinary bladder given the amount of distention.  Liver, gallbladder, and bile ducts: No focal hepatic lesions. Status  post cholecystectomy. Mild bile duct dilatation. Mild hepatic steatosis.  Spleen: Spleen is normal size.  No focal splenic lesions.  Adrenal glands: Unremarkable.  Pancreas: No focal masses.  No pancreatic duct dilation.  Abdominal aorta and Vascular Structures: No aneurysmal dilation. Mild  atherosclerotic disease.  Stomach and Bowel: No abnormally dilated loops of bowel.  No  significant  hiatal hernia.  No significant bowel wall thickening.  Ligament of  Treitz has normal anatomic position. Diverticulosis without  diverticulitis.  Reproductive Organs: Fluid density structure in the right pelvis  measuring 2.5 x 5.7 cm. This is unchanged and may be related to  postoperative seroma. Adnexal cysts or perinephric process less likely  but not excluded. This was not reported is hypermetabolic on the  previous PET/CT.  Lymph nodes: No pathologically enlarged lymph nodes.  Soft tissues: Unremarkable.  Osseous structures: No aggressive focal lytic or sclerotic osseous  lesions. Osteopenia.  IMPRESSION:  1.No imaging findings of metastatic disease in the abdomen or pelvis.  2.Mild fatty infiltration liver with mild bile duct dilatation is  similar to previous study.  3.Fluid density lesion in the right pelvis may be associated with the  adnexa. It is stable since 01/31/2020    LABS (Reviewed):  Hematology WBC   Date Value Ref Range Status   11/10/2020 9.62 3.40 - 10.80 10*3/mm3 Final   04/15/2020 7.6 3.4 - 10.8 x10E3/uL Final     RBC   Date Value Ref Range Status   11/10/2020 4.61 3.77 - 5.28 10*6/mm3 Final   04/15/2020 4.91 3.77 - 5.28 x10E6/uL Final     Hemoglobin   Date Value Ref Range Status   11/10/2020 13.1 12.0 - 15.9 g/dL Final     Hematocrit   Date Value Ref Range Status   11/10/2020 39.4 34.0 - 46.6 % Final     Platelets   Date Value Ref Range Status   11/10/2020 196 140 - 450 10*3/mm3 Final      Chemistry   Lab Results   Component Value Date    GLUCOSE 148 (H) 11/10/2020    BUN 18 11/10/2020    CREATININE 0.85 11/10/2020    EGFRIFNONA 66 11/10/2020    BCR 21.2 11/10/2020    K 3.9 11/10/2020    CO2 27.3 11/10/2020    CALCIUM 10.6 (H) 11/10/2020    ALBUMIN 4.10 08/03/2020    AST 25 08/03/2020    ALT 20 08/03/2020         Assessment/Plan     ASSESSMENT AND PLAN:    1. 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 (CMS/HCC)       -Metastatic Neuroendocrine Carcinoma, Symptomatic L  Lung/mediastinal disease with cardiac abutment  -Improved with XRT.  -Has followed closely with Cardiology and work-up reviewed.  -Needs infusion/lantreotide today.  Discussed with med/onc and they can restart today.  We are assisting the patient.  Will need every 4 weeks as symptomatic without.  CT imaging does show improvement since the XRT.    FU here in 6 months.      This assessment comes from my review of the imaging, pathology, physician notes and other pertinent information as mentioned.  TIME SPENT WITH PATIENT:   I spent greater than 15 minutes in face-to-face time with the patient and greater than 60% of those minutes  were spent in counseling and coordination of care (getting her lantretotide set-up), including review of imaging and pathology; indications, goals, logistics, alternatives and risks - both common and rare - for my recommendations as well as surveillance and potential outcomes.    CC: MD Gloria Colvin APRN Surender K. Sandella, MD John A Cox, MD  12/8/2020  10:29 AM EST

## 2021-01-01 ENCOUNTER — HOSPITAL ENCOUNTER (OUTPATIENT)
Dept: ONCOLOGY | Facility: HOSPITAL | Age: 71
Setting detail: INFUSION SERIES
Discharge: HOME | End: 2021-01-11

## 2021-01-01 ENCOUNTER — APPOINTMENT (OUTPATIENT)
Dept: OTHER | Facility: HOSPITAL | Age: 71
End: 2021-01-01

## 2021-01-01 ENCOUNTER — APPOINTMENT (OUTPATIENT)
Dept: LAB | Facility: HOSPITAL | Age: 71
End: 2021-01-01

## 2021-01-01 ENCOUNTER — APPOINTMENT (OUTPATIENT)
Dept: ULTRASOUND IMAGING | Facility: HOSPITAL | Age: 71
End: 2021-01-01

## 2021-01-01 ENCOUNTER — TELEPHONE (OUTPATIENT)
Dept: ONCOLOGY | Facility: HOSPITAL | Age: 71
End: 2021-01-01

## 2021-01-01 ENCOUNTER — APPOINTMENT (OUTPATIENT)
Dept: GENERAL RADIOLOGY | Facility: HOSPITAL | Age: 71
End: 2021-01-01

## 2021-01-01 ENCOUNTER — HOSPITAL ENCOUNTER (INPATIENT)
Facility: HOSPITAL | Age: 71
LOS: 2 days | End: 2021-02-05
Attending: INTERNAL MEDICINE | Admitting: INTERNAL MEDICINE

## 2021-01-01 ENCOUNTER — OFFICE VISIT (OUTPATIENT)
Dept: ONCOLOGY | Facility: CLINIC | Age: 71
End: 2021-01-01

## 2021-01-01 ENCOUNTER — APPOINTMENT (OUTPATIENT)
Dept: CARDIOLOGY | Facility: HOSPITAL | Age: 71
End: 2021-01-01

## 2021-01-01 VITALS
HEIGHT: 61 IN | BODY MASS INDEX: 40.22 KG/M2 | OXYGEN SATURATION: 95 % | SYSTOLIC BLOOD PRESSURE: 118 MMHG | TEMPERATURE: 97.2 F | DIASTOLIC BLOOD PRESSURE: 77 MMHG | RESPIRATION RATE: 32 BRPM | WEIGHT: 213 LBS | HEART RATE: 90 BPM

## 2021-01-01 VITALS
HEIGHT: 61 IN | BODY MASS INDEX: 35.87 KG/M2 | HEART RATE: 80 BPM | TEMPERATURE: 97.7 F | RESPIRATION RATE: 16 BRPM | WEIGHT: 190 LBS | SYSTOLIC BLOOD PRESSURE: 137 MMHG | DIASTOLIC BLOOD PRESSURE: 85 MMHG

## 2021-01-01 VITALS
TEMPERATURE: 97.7 F | DIASTOLIC BLOOD PRESSURE: 85 MMHG | RESPIRATION RATE: 18 BRPM | SYSTOLIC BLOOD PRESSURE: 137 MMHG | BODY MASS INDEX: 35.95 KG/M2 | HEIGHT: 61 IN | HEART RATE: 80 BPM | WEIGHT: 190.4 LBS

## 2021-01-01 DIAGNOSIS — C7A.8 NEUROENDOCRINE CARCINOMA METASTATIC TO MULTIPLE SITES (HCC): Primary | ICD-10-CM

## 2021-01-01 DIAGNOSIS — C7B.8 NEUROENDOCRINE CARCINOMA METASTATIC TO MULTIPLE SITES (HCC): Primary | ICD-10-CM

## 2021-01-01 DIAGNOSIS — Z53.21 PROCEDURE AND TREATMENT NOT CARRIED OUT DUE TO PATIENT LEAVING PRIOR TO BEING SEEN BY HEALTH CARE PROVIDER: ICD-10-CM

## 2021-01-01 LAB
ALBUMIN SERPL-MCNC: 2.3 G/DL (ref 3.5–5.2)
ALBUMIN SERPL-MCNC: 2.8 G/DL (ref 3.5–5.2)
ALBUMIN SERPL-MCNC: 2.8 G/DL (ref 3.5–5.2)
ALBUMIN SERPL-MCNC: 3.7 G/DL (ref 3.5–5.2)
ALBUMIN/GLOB SERPL: 0.8 G/DL
ALBUMIN/GLOB SERPL: 0.8 G/DL
ALBUMIN/GLOB SERPL: 0.9 G/DL
ALBUMIN/GLOB SERPL: 1.6 G/DL
ALP SERPL-CCNC: 136 U/L (ref 39–117)
ALP SERPL-CCNC: 141 U/L (ref 39–117)
ALP SERPL-CCNC: 144 U/L (ref 39–117)
ALP SERPL-CCNC: 60 U/L (ref 39–117)
ALT SERPL W P-5'-P-CCNC: 1169 U/L (ref 1–33)
ALT SERPL W P-5'-P-CCNC: 549 U/L (ref 1–33)
ALT SERPL W P-5'-P-CCNC: 9 U/L (ref 1–33)
ALT SERPL W P-5'-P-CCNC: 976 U/L (ref 1–33)
AMORPH URATE CRY URNS QL MICRO: ABNORMAL /HPF
ANION GAP SERPL CALCULATED.3IONS-SCNC: 17 MMOL/L (ref 5–15)
ANION GAP SERPL CALCULATED.3IONS-SCNC: 17 MMOL/L (ref 5–15)
ANION GAP SERPL CALCULATED.3IONS-SCNC: 18 MMOL/L (ref 5–15)
ANION GAP SERPL CALCULATED.3IONS-SCNC: 19 MMOL/L (ref 5–15)
ANION GAP SERPL CALCULATED.3IONS-SCNC: 19 MMOL/L (ref 5–15)
ANION GAP SERPL CALCULATED.3IONS-SCNC: 20 MMOL/L (ref 5–15)
ANION GAP SERPL CALCULATED.3IONS-SCNC: 9 MMOL/L (ref 5–15)
ANISOCYTOSIS BLD QL: ABNORMAL
APTT PPP: 23.2 SECONDS (ref 24–31)
ARTERIAL PATENCY WRIST A: POSITIVE
AST SERPL-CCNC: 16 U/L (ref 1–32)
AST SERPL-CCNC: 2875 U/L (ref 1–32)
AST SERPL-CCNC: 3783 U/L (ref 1–32)
AST SERPL-CCNC: 415 U/L (ref 1–32)
ATMOSPHERIC PRESS: ABNORMAL MM[HG]
BACTERIA SPEC AEROBE CULT: NO GROWTH
BACTERIA UR QL AUTO: ABNORMAL /HPF
BACTERIA UR QL AUTO: ABNORMAL /HPF
BASE EXCESS BLDA CALC-SCNC: -4.4 MMOL/L (ref 0–3)
BASE EXCESS BLDA CALC-SCNC: -8.3 MMOL/L (ref 0–3)
BASE EXCESS BLDA CALC-SCNC: -8.4 MMOL/L (ref 0–3)
BASE EXCESS BLDA CALC-SCNC: -8.8 MMOL/L (ref 0–3)
BASOPHILS # BLD AUTO: 0 10*3/MM3 (ref 0–0.2)
BASOPHILS # BLD AUTO: 0 10*3/MM3 (ref 0–0.2)
BASOPHILS # BLD AUTO: 0.03 10*3/MM3 (ref 0–0.2)
BASOPHILS NFR BLD AUTO: 0.1 % (ref 0–1.5)
BASOPHILS NFR BLD AUTO: 0.1 % (ref 0–1.5)
BASOPHILS NFR BLD AUTO: 0.5 % (ref 0–1.5)
BDY SITE: ABNORMAL
BILIRUB SERPL-MCNC: 0.5 MG/DL (ref 0–1.2)
BILIRUB SERPL-MCNC: 0.5 MG/DL (ref 0–1.2)
BILIRUB SERPL-MCNC: 1.1 MG/DL (ref 0–1.2)
BILIRUB SERPL-MCNC: 1.1 MG/DL (ref 0–1.2)
BILIRUB UR QL STRIP: ABNORMAL
BILIRUB UR QL STRIP: NEGATIVE
BUN SERPL-MCNC: 31 MG/DL (ref 8–23)
BUN SERPL-MCNC: 40 MG/DL (ref 8–23)
BUN SERPL-MCNC: 51 MG/DL (ref 8–23)
BUN SERPL-MCNC: 55 MG/DL (ref 8–23)
BUN SERPL-MCNC: 59 MG/DL (ref 8–23)
BUN SERPL-MCNC: 67 MG/DL (ref 8–23)
BUN SERPL-MCNC: 9 MG/DL (ref 8–23)
BUN/CREAT SERPL: 12.9 (ref 7–25)
BUN/CREAT SERPL: 14 (ref 7–25)
BUN/CREAT SERPL: 14.3 (ref 7–25)
BUN/CREAT SERPL: 14.6 (ref 7–25)
BUN/CREAT SERPL: 14.6 (ref 7–25)
BUN/CREAT SERPL: 14.9 (ref 7–25)
BUN/CREAT SERPL: 16.8 (ref 7–25)
BURR CELLS BLD QL SMEAR: ABNORMAL
CA-I SERPL ISE-MCNC: 1.15 MMOL/L (ref 1.2–1.3)
CALCIUM SPEC-SCNC: 8.4 MG/DL (ref 8.6–10.5)
CALCIUM SPEC-SCNC: 8.5 MG/DL (ref 8.6–10.5)
CALCIUM SPEC-SCNC: 8.5 MG/DL (ref 8.6–10.5)
CALCIUM SPEC-SCNC: 8.6 MG/DL (ref 8.6–10.5)
CALCIUM SPEC-SCNC: 8.6 MG/DL (ref 8.6–10.5)
CALCIUM SPEC-SCNC: 8.7 MG/DL (ref 8.6–10.5)
CALCIUM SPEC-SCNC: 9.8 MG/DL (ref 8.6–10.5)
CHLORIDE SERPL-SCNC: 101 MMOL/L (ref 98–107)
CHLORIDE SERPL-SCNC: 102 MMOL/L (ref 98–107)
CHLORIDE SERPL-SCNC: 104 MMOL/L (ref 98–107)
CHLORIDE SERPL-SCNC: 106 MMOL/L (ref 98–107)
CHLORIDE SERPL-SCNC: 107 MMOL/L (ref 98–107)
CHLORIDE SERPL-SCNC: 109 MMOL/L (ref 98–107)
CHLORIDE SERPL-SCNC: 96 MMOL/L (ref 98–107)
CHLORIDE UR-SCNC: 44 MMOL/L
CHOLEST SERPL-MCNC: 52 MG/DL (ref 0–200)
CK SERPL-CCNC: 5494 U/L (ref 20–180)
CK SERPL-CCNC: 877 U/L (ref 20–180)
CLARITY UR: ABNORMAL
CLARITY UR: ABNORMAL
CO2 BLDA-SCNC: 18.1 MMOL/L (ref 22–29)
CO2 BLDA-SCNC: 18.5 MMOL/L (ref 22–29)
CO2 BLDA-SCNC: 20 MMOL/L (ref 22–29)
CO2 BLDA-SCNC: 20.5 MMOL/L (ref 22–29)
CO2 SERPL-SCNC: 15 MMOL/L (ref 22–29)
CO2 SERPL-SCNC: 16 MMOL/L (ref 22–29)
CO2 SERPL-SCNC: 18 MMOL/L (ref 22–29)
CO2 SERPL-SCNC: 18 MMOL/L (ref 22–29)
CO2 SERPL-SCNC: 19 MMOL/L (ref 22–29)
CO2 SERPL-SCNC: 21 MMOL/L (ref 22–29)
CO2 SERPL-SCNC: 25 MMOL/L (ref 22–29)
COLOR UR: ABNORMAL
COLOR UR: YELLOW
CORTIS SERPL-MCNC: 23.08 MCG/DL
CREAT SERPL-MCNC: 0.7 MG/DL (ref 0.57–1)
CREAT SERPL-MCNC: 2.13 MG/DL (ref 0.57–1)
CREAT SERPL-MCNC: 2.8 MG/DL (ref 0.57–1)
CREAT SERPL-MCNC: 3.43 MG/DL (ref 0.57–1)
CREAT SERPL-MCNC: 3.77 MG/DL (ref 0.57–1)
CREAT SERPL-MCNC: 3.99 MG/DL (ref 0.57–1)
CREAT SERPL-MCNC: 4.2 MG/DL (ref 0.57–1)
CREAT UR-MCNC: 47.9 MG/DL
CRP SERPL-MCNC: 18.66 MG/DL (ref 0–0.5)
CRP SERPL-MCNC: 20.15 MG/DL (ref 0–0.5)
CRP SERPL-MCNC: 21.89 MG/DL (ref 0–0.5)
D DIMER PPP FEU-MCNC: >35.2 MG/L (FEU) (ref 0–0.59)
D DIMER PPP FEU-MCNC: >35.2 MG/L (FEU) (ref 0–0.59)
D-LACTATE SERPL-SCNC: 3.8 MMOL/L (ref 0.5–2)
D-LACTATE SERPL-SCNC: 4.4 MMOL/L (ref 0.5–2)
D-LACTATE SERPL-SCNC: 4.5 MMOL/L (ref 0.5–2)
D-LACTATE SERPL-SCNC: 5.1 MMOL/L (ref 0.5–2)
D-LACTATE SERPL-SCNC: 5.3 MMOL/L (ref 0.5–2)
D-LACTATE SERPL-SCNC: 6.8 MMOL/L (ref 0.5–2)
D-LACTATE SERPL-SCNC: 7.5 MMOL/L (ref 0.5–2)
DEPRECATED RDW RBC AUTO: 47 FL (ref 37–54)
DEPRECATED RDW RBC AUTO: 48.6 FL (ref 37–54)
DEPRECATED RDW RBC AUTO: 51.2 FL (ref 37–54)
DEPRECATED RDW RBC AUTO: 51.6 FL (ref 37–54)
EOSINOPHIL # BLD AUTO: 0 10*3/MM3 (ref 0–0.4)
EOSINOPHIL # BLD AUTO: 0 10*3/MM3 (ref 0–0.4)
EOSINOPHIL # BLD AUTO: 0.72 10*3/MM3 (ref 0–0.4)
EOSINOPHIL NFR BLD AUTO: 0 % (ref 0.3–6.2)
EOSINOPHIL NFR BLD AUTO: 0.1 % (ref 0.3–6.2)
EOSINOPHIL NFR BLD AUTO: 11.4 % (ref 0.3–6.2)
ERYTHROCYTE [DISTWIDTH] IN BLOOD BY AUTOMATED COUNT: 14.9 % (ref 12.3–15.4)
ERYTHROCYTE [DISTWIDTH] IN BLOOD BY AUTOMATED COUNT: 16.8 % (ref 12.3–15.4)
ERYTHROCYTE [DISTWIDTH] IN BLOOD BY AUTOMATED COUNT: 16.8 % (ref 12.3–15.4)
ERYTHROCYTE [DISTWIDTH] IN BLOOD BY AUTOMATED COUNT: 17.4 % (ref 12.3–15.4)
FERRITIN SERPL-MCNC: 5413 NG/ML (ref 13–150)
FERRITIN SERPL-MCNC: ABNORMAL NG/ML (ref 13–150)
FIBRINOGEN PPP-MCNC: 599 MG/DL (ref 210–450)
GFR SERPL CREATININE-BSD FRML MDRD: 10 ML/MIN/1.73
GFR SERPL CREATININE-BSD FRML MDRD: 11 ML/MIN/1.73
GFR SERPL CREATININE-BSD FRML MDRD: 12 ML/MIN/1.73
GFR SERPL CREATININE-BSD FRML MDRD: 13 ML/MIN/1.73
GFR SERPL CREATININE-BSD FRML MDRD: 17 ML/MIN/1.73
GFR SERPL CREATININE-BSD FRML MDRD: 23 ML/MIN/1.73
GFR SERPL CREATININE-BSD FRML MDRD: 83 ML/MIN/1.73
GFR SERPL CREATININE-BSD FRML MDRD: ABNORMAL ML/MIN/{1.73_M2}
GIANT PLATELETS: ABNORMAL
GLOBULIN UR ELPH-MCNC: 2.3 GM/DL
GLOBULIN UR ELPH-MCNC: 3 GM/DL
GLOBULIN UR ELPH-MCNC: 3 GM/DL
GLOBULIN UR ELPH-MCNC: 3.4 GM/DL
GLUCOSE BLDC GLUCOMTR-MCNC: 177 MG/DL (ref 70–105)
GLUCOSE BLDC GLUCOMTR-MCNC: 192 MG/DL (ref 70–105)
GLUCOSE BLDC GLUCOMTR-MCNC: 215 MG/DL (ref 70–105)
GLUCOSE BLDC GLUCOMTR-MCNC: 217 MG/DL (ref 70–105)
GLUCOSE BLDC GLUCOMTR-MCNC: 283 MG/DL (ref 70–105)
GLUCOSE BLDC GLUCOMTR-MCNC: 292 MG/DL (ref 70–105)
GLUCOSE BLDC GLUCOMTR-MCNC: 301 MG/DL (ref 70–105)
GLUCOSE BLDC GLUCOMTR-MCNC: 304 MG/DL (ref 70–105)
GLUCOSE BLDC GLUCOMTR-MCNC: 321 MG/DL (ref 70–105)
GLUCOSE SERPL-MCNC: 117 MG/DL (ref 65–99)
GLUCOSE SERPL-MCNC: 212 MG/DL (ref 65–99)
GLUCOSE SERPL-MCNC: 251 MG/DL (ref 65–99)
GLUCOSE SERPL-MCNC: 307 MG/DL (ref 65–99)
GLUCOSE SERPL-MCNC: 314 MG/DL (ref 65–99)
GLUCOSE SERPL-MCNC: 339 MG/DL (ref 65–99)
GLUCOSE SERPL-MCNC: 375 MG/DL (ref 65–99)
GLUCOSE UR STRIP-MCNC: ABNORMAL MG/DL
GLUCOSE UR STRIP-MCNC: ABNORMAL MG/DL
HCO3 BLDA-SCNC: 17 MMOL/L (ref 21–28)
HCO3 BLDA-SCNC: 17.4 MMOL/L (ref 21–28)
HCO3 BLDA-SCNC: 19.1 MMOL/L (ref 21–28)
HCO3 BLDA-SCNC: 19.1 MMOL/L (ref 21–28)
HCT VFR BLD AUTO: 37.5 % (ref 34–46.6)
HCT VFR BLD AUTO: 39 % (ref 34–46.6)
HCT VFR BLD AUTO: 42.2 % (ref 34–46.6)
HCT VFR BLD AUTO: 44.2 % (ref 34–46.6)
HDLC SERPL-MCNC: 25 MG/DL (ref 40–60)
HEMODILUTION: NO
HGB BLD-MCNC: 12.3 G/DL (ref 12–15.9)
HGB BLD-MCNC: 12.6 G/DL (ref 12–15.9)
HGB BLD-MCNC: 13.9 G/DL (ref 12–15.9)
HGB BLD-MCNC: 14.3 G/DL (ref 12–15.9)
HGB UR QL STRIP.AUTO: ABNORMAL
HGB UR QL STRIP.AUTO: ABNORMAL
HYALINE CASTS UR QL AUTO: ABNORMAL /LPF
HYALINE CASTS UR QL AUTO: ABNORMAL /LPF
INHALED O2 CONCENTRATION: 55 %
INHALED O2 CONCENTRATION: 80 %
INHALED O2 CONCENTRATION: 90 %
INHALED O2 CONCENTRATION: 90 %
INR PPP: 1.23 (ref 0.93–1.1)
INR PPP: 1.29 (ref 0.93–1.1)
KETONES UR QL STRIP: ABNORMAL
KETONES UR QL STRIP: NEGATIVE
L PNEUMO1 AG UR QL IA: NEGATIVE
LACTATE HOLD SPECIMEN: NORMAL
LARGE PLATELETS: ABNORMAL
LDH SERPL-CCNC: >2500 U/L (ref 135–214)
LDLC SERPL CALC-MCNC: <5 MG/DL (ref 0–100)
LDLC/HDLC SERPL: 0.04 {RATIO}
LEUKOCYTE ESTERASE UR QL STRIP.AUTO: ABNORMAL
LEUKOCYTE ESTERASE UR QL STRIP.AUTO: NEGATIVE
LV EF 2D ECHO EST: 55 %
LYMPHOCYTES # BLD AUTO: 0.4 10*3/MM3 (ref 0.7–3.1)
LYMPHOCYTES # BLD AUTO: 0.4 10*3/MM3 (ref 0.7–3.1)
LYMPHOCYTES # BLD AUTO: 0.9 10*3/MM3 (ref 0.7–3.1)
LYMPHOCYTES # BLD MANUAL: 0.23 10*3/MM3 (ref 0.7–3.1)
LYMPHOCYTES NFR BLD AUTO: 14.3 % (ref 19.6–45.3)
LYMPHOCYTES NFR BLD AUTO: 3.1 % (ref 19.6–45.3)
LYMPHOCYTES NFR BLD AUTO: 3.3 % (ref 19.6–45.3)
LYMPHOCYTES NFR BLD MANUAL: 2 % (ref 19.6–45.3)
LYMPHOCYTES NFR BLD MANUAL: 2 % (ref 5–12)
MAGNESIUM SERPL-MCNC: 1.8 MG/DL (ref 1.6–2.4)
MAGNESIUM SERPL-MCNC: 2 MG/DL (ref 1.6–2.4)
MAGNESIUM SERPL-MCNC: 2 MG/DL (ref 1.6–2.4)
MCH RBC QN AUTO: 27.3 PG (ref 26.6–33)
MCH RBC QN AUTO: 27.3 PG (ref 26.6–33)
MCH RBC QN AUTO: 27.5 PG (ref 26.6–33)
MCH RBC QN AUTO: 27.5 PG (ref 26.6–33)
MCHC RBC AUTO-ENTMCNC: 31.5 G/DL (ref 31.5–35.7)
MCHC RBC AUTO-ENTMCNC: 32.4 G/DL (ref 31.5–35.7)
MCHC RBC AUTO-ENTMCNC: 33 G/DL (ref 31.5–35.7)
MCHC RBC AUTO-ENTMCNC: 33.5 G/DL (ref 31.5–35.7)
MCV RBC AUTO: 81.7 FL (ref 79–97)
MCV RBC AUTO: 82.8 FL (ref 79–97)
MCV RBC AUTO: 84.9 FL (ref 79–97)
MCV RBC AUTO: 87.1 FL (ref 79–97)
METAMYELOCYTES NFR BLD MANUAL: 1 % (ref 0–0)
MICROCYTES BLD QL: ABNORMAL
MODALITY: ABNORMAL
MONOCYTES # BLD AUTO: 0.23 10*3/MM3 (ref 0.1–0.9)
MONOCYTES # BLD AUTO: 0.5 10*3/MM3 (ref 0.1–0.9)
MONOCYTES # BLD AUTO: 0.5 10*3/MM3 (ref 0.1–0.9)
MONOCYTES # BLD AUTO: 0.77 10*3/MM3 (ref 0.1–0.9)
MONOCYTES NFR BLD AUTO: 12.2 % (ref 5–12)
MONOCYTES NFR BLD AUTO: 3.3 % (ref 5–12)
MONOCYTES NFR BLD AUTO: 3.7 % (ref 5–12)
MRSA DNA SPEC QL NAA+PROBE: NORMAL
NEUTROPHILS # BLD AUTO: 10.74 10*3/MM3 (ref 1.7–7)
NEUTROPHILS NFR BLD AUTO: 11.6 10*3/MM3 (ref 1.7–7)
NEUTROPHILS NFR BLD AUTO: 12.8 10*3/MM3 (ref 1.7–7)
NEUTROPHILS NFR BLD AUTO: 3.89 10*3/MM3 (ref 1.7–7)
NEUTROPHILS NFR BLD AUTO: 61.6 % (ref 42.7–76)
NEUTROPHILS NFR BLD AUTO: 92.8 % (ref 42.7–76)
NEUTROPHILS NFR BLD AUTO: 93.5 % (ref 42.7–76)
NEUTROPHILS NFR BLD MANUAL: 66 % (ref 42.7–76)
NEUTS BAND NFR BLD MANUAL: 29 % (ref 0–5)
NITRITE UR QL STRIP: NEGATIVE
NITRITE UR QL STRIP: NEGATIVE
NRBC BLD AUTO-RTO: 0.2 /100 WBC (ref 0–0.2)
NRBC BLD AUTO-RTO: 0.6 /100 WBC (ref 0–0.2)
NT-PROBNP SERPL-MCNC: ABNORMAL PG/ML (ref 0–900)
PCO2 BLDA: 29.7 MM HG (ref 35–48)
PCO2 BLDA: 35.8 MM HG (ref 35–48)
PCO2 BLDA: 35.8 MM HG (ref 35–48)
PCO2 BLDA: 45.5 MM HG (ref 35–48)
PEEP RESPIRATORY: 10 CM[H2O]
PEEP RESPIRATORY: 12 CM[H2O]
PH BLDA: 7.23 PH UNITS (ref 7.35–7.45)
PH BLDA: 7.29 PH UNITS (ref 7.35–7.45)
PH BLDA: 7.29 PH UNITS (ref 7.35–7.45)
PH BLDA: 7.42 PH UNITS (ref 7.35–7.45)
PH UR STRIP.AUTO: 5.5 [PH] (ref 5–8)
PH UR STRIP.AUTO: <=5 [PH] (ref 5–8)
PHOSPHATE SERPL-MCNC: 2.9 MG/DL (ref 2.5–4.5)
PHOSPHATE SERPL-MCNC: 4.3 MG/DL (ref 2.5–4.5)
PHOSPHATE SERPL-MCNC: 4.9 MG/DL (ref 2.5–4.5)
PLAT MORPH BLD: NORMAL
PLATELET # BLD AUTO: 113 10*3/MM3 (ref 140–450)
PLATELET # BLD AUTO: 122 10*3/MM3 (ref 140–450)
PLATELET # BLD AUTO: 131 10*3/MM3 (ref 140–450)
PLATELET # BLD AUTO: 92 10*3/MM3 (ref 140–450)
PMV BLD AUTO: 10.1 FL (ref 6–12)
PMV BLD AUTO: 10.3 FL (ref 6–12)
PMV BLD AUTO: 8.6 FL (ref 6–12)
PMV BLD AUTO: 9.4 FL (ref 6–12)
PO2 BLDA: 52.4 MM HG (ref 83–108)
PO2 BLDA: 59.9 MM HG (ref 83–108)
PO2 BLDA: 67.3 MM HG (ref 83–108)
PO2 BLDA: 91 MM HG (ref 83–108)
POIKILOCYTOSIS BLD QL SMEAR: ABNORMAL
POTASSIUM SERPL-SCNC: 3 MMOL/L (ref 3.5–5.2)
POTASSIUM SERPL-SCNC: 3.3 MMOL/L (ref 3.5–5.2)
POTASSIUM SERPL-SCNC: 3.4 MMOL/L (ref 3.5–5.2)
POTASSIUM SERPL-SCNC: 3.5 MMOL/L (ref 3.5–5.2)
POTASSIUM SERPL-SCNC: 3.5 MMOL/L (ref 3.5–5.2)
POTASSIUM SERPL-SCNC: 3.7 MMOL/L (ref 3.5–5.2)
POTASSIUM SERPL-SCNC: 3.8 MMOL/L (ref 3.5–5.2)
POTASSIUM SERPL-SCNC: 4 MMOL/L (ref 3.5–5.2)
PROCALCITONIN SERPL-MCNC: 14.49 NG/ML (ref 0–0.25)
PROT SERPL-MCNC: 5.3 G/DL (ref 6–8.5)
PROT SERPL-MCNC: 5.8 G/DL (ref 6–8.5)
PROT SERPL-MCNC: 6 G/DL (ref 6–8.5)
PROT SERPL-MCNC: 6.2 G/DL (ref 6–8.5)
PROT UR QL STRIP: ABNORMAL
PROT UR QL STRIP: ABNORMAL
PROTHROMBIN TIME: 13.4 SECONDS (ref 9.6–11.7)
PROTHROMBIN TIME: 14 SECONDS (ref 9.6–11.7)
QT INTERVAL: 367 MS
RBC # BLD AUTO: 4.48 10*6/MM3 (ref 3.77–5.28)
RBC # BLD AUTO: 4.59 10*6/MM3 (ref 3.77–5.28)
RBC # BLD AUTO: 5.09 10*6/MM3 (ref 3.77–5.28)
RBC # BLD AUTO: 5.21 10*6/MM3 (ref 3.77–5.28)
RBC # UR: ABNORMAL /HPF
RBC # UR: ABNORMAL /HPF
RBC MORPH BLD: NORMAL
REF LAB TEST METHOD: ABNORMAL
REF LAB TEST METHOD: ABNORMAL
RESPIRATORY RATE: 24
RESPIRATORY RATE: 28
S PNEUM AG SPEC QL LA: NEGATIVE
SAO2 % BLDCOA: 80 % (ref 94–98)
SAO2 % BLDCOA: 90.9 % (ref 94–98)
SAO2 % BLDCOA: 91.4 % (ref 94–98)
SAO2 % BLDCOA: 96.1 % (ref 94–98)
SCAN SLIDE: NORMAL
SMALL PLATELETS BLD QL SMEAR: ABNORMAL
SODIUM SERPL-SCNC: 136 MMOL/L (ref 136–145)
SODIUM SERPL-SCNC: 136 MMOL/L (ref 136–145)
SODIUM SERPL-SCNC: 138 MMOL/L (ref 136–145)
SODIUM SERPL-SCNC: 138 MMOL/L (ref 136–145)
SODIUM SERPL-SCNC: 142 MMOL/L (ref 136–145)
SODIUM SERPL-SCNC: 143 MMOL/L (ref 136–145)
SODIUM SERPL-SCNC: 143 MMOL/L (ref 136–145)
SODIUM UR-SCNC: 51 MMOL/L
SP GR UR STRIP: 1.02 (ref 1–1.03)
SP GR UR STRIP: 1.04 (ref 1–1.03)
SQUAMOUS #/AREA URNS HPF: ABNORMAL /HPF
SQUAMOUS #/AREA URNS HPF: ABNORMAL /HPF
T4 FREE SERPL-MCNC: 1.82 NG/DL (ref 0.93–1.7)
TRIGL SERPL-MCNC: 130 MG/DL (ref 0–150)
TROPONIN T SERPL-MCNC: 0.42 NG/ML (ref 0–0.03)
TROPONIN T SERPL-MCNC: 0.46 NG/ML (ref 0–0.03)
TROPONIN T SERPL-MCNC: 0.6 NG/ML (ref 0–0.03)
TSH SERPL DL<=0.05 MIU/L-ACNC: 0.02 UIU/ML (ref 0.27–4.2)
TSH SERPL DL<=0.05 MIU/L-ACNC: 0.03 UIU/ML (ref 0.27–4.2)
UROBILINOGEN UR QL STRIP: ABNORMAL
UROBILINOGEN UR QL STRIP: ABNORMAL
VENTILATOR MODE: ABNORMAL
VLDLC SERPL-MCNC: ABNORMAL MG/DL
VT ON VENT VENT: 500 ML
VT ON VENT VENT: 520 ML
WBC # BLD AUTO: 11.3 10*3/MM3 (ref 3.4–10.8)
WBC # BLD AUTO: 12.5 10*3/MM3 (ref 3.4–10.8)
WBC # BLD AUTO: 13.7 10*3/MM3 (ref 3.4–10.8)
WBC # BLD AUTO: 6.31 10*3/MM3 (ref 3.4–10.8)
WBC MORPH BLD: NORMAL
WBC MORPH BLD: NORMAL
WBC UR QL AUTO: ABNORMAL /HPF
WBC UR QL AUTO: ABNORMAL /HPF

## 2021-01-01 PROCEDURE — 94799 UNLISTED PULMONARY SVC/PX: CPT

## 2021-01-01 PROCEDURE — 25010000002 MIDAZOLAM PER 1 MG: Performed by: NURSE PRACTITIONER

## 2021-01-01 PROCEDURE — 83605 ASSAY OF LACTIC ACID: CPT | Performed by: NURSE PRACTITIONER

## 2021-01-01 PROCEDURE — 84484 ASSAY OF TROPONIN QUANT: CPT | Performed by: NURSE PRACTITIONER

## 2021-01-01 PROCEDURE — 25010000002 DEXAMETHASONE PER 1 MG: Performed by: INTERNAL MEDICINE

## 2021-01-01 PROCEDURE — 85025 COMPLETE CBC W/AUTO DIFF WBC: CPT | Performed by: NURSE PRACTITIONER

## 2021-01-01 PROCEDURE — 82962 GLUCOSE BLOOD TEST: CPT

## 2021-01-01 PROCEDURE — 25010000003 POTASSIUM CHLORIDE 10 MEQ/100ML SOLUTION: Performed by: NURSE PRACTITIONER

## 2021-01-01 PROCEDURE — 25010000002 FENTANYL CITRATE (PF) 2500 MCG/50ML SOLUTION: Performed by: NURSE PRACTITIONER

## 2021-01-01 PROCEDURE — 25010000002 PIPERACILLIN SOD-TAZOBACTAM PER 1 G: Performed by: INTERNAL MEDICINE

## 2021-01-01 PROCEDURE — 87899 AGENT NOS ASSAY W/OPTIC: CPT | Performed by: NURSE PRACTITIONER

## 2021-01-01 PROCEDURE — 25010000002 MORPHINE PER 10 MG: Performed by: NURSE PRACTITIONER

## 2021-01-01 PROCEDURE — 83615 LACTATE (LD) (LDH) ENZYME: CPT | Performed by: NURSE PRACTITIONER

## 2021-01-01 PROCEDURE — 82533 TOTAL CORTISOL: CPT | Performed by: NURSE PRACTITIONER

## 2021-01-01 PROCEDURE — 84100 ASSAY OF PHOSPHORUS: CPT | Performed by: NURSE PRACTITIONER

## 2021-01-01 PROCEDURE — 81001 URINALYSIS AUTO W/SCOPE: CPT | Performed by: INTERNAL MEDICINE

## 2021-01-01 PROCEDURE — 99222 1ST HOSP IP/OBS MODERATE 55: CPT | Performed by: INTERNAL MEDICINE

## 2021-01-01 PROCEDURE — 25010000002 HEPARIN (PORCINE) PER 1000 UNITS: Performed by: NURSE PRACTITIONER

## 2021-01-01 PROCEDURE — 84443 ASSAY THYROID STIM HORMONE: CPT | Performed by: NURSE PRACTITIONER

## 2021-01-01 PROCEDURE — 25010000002 FUROSEMIDE PER 20 MG: Performed by: INTERNAL MEDICINE

## 2021-01-01 PROCEDURE — 71045 X-RAY EXAM CHEST 1 VIEW: CPT

## 2021-01-01 PROCEDURE — 94003 VENT MGMT INPAT SUBQ DAY: CPT

## 2021-01-01 PROCEDURE — 36600 WITHDRAWAL OF ARTERIAL BLOOD: CPT

## 2021-01-01 PROCEDURE — 93010 ELECTROCARDIOGRAM REPORT: CPT | Performed by: INTERNAL MEDICINE

## 2021-01-01 PROCEDURE — 82803 BLOOD GASES ANY COMBINATION: CPT

## 2021-01-01 PROCEDURE — 84300 ASSAY OF URINE SODIUM: CPT | Performed by: INTERNAL MEDICINE

## 2021-01-01 PROCEDURE — 82728 ASSAY OF FERRITIN: CPT | Performed by: NURSE PRACTITIONER

## 2021-01-01 PROCEDURE — 86140 C-REACTIVE PROTEIN: CPT | Performed by: NURSE PRACTITIONER

## 2021-01-01 PROCEDURE — 82436 ASSAY OF URINE CHLORIDE: CPT | Performed by: INTERNAL MEDICINE

## 2021-01-01 PROCEDURE — 82570 ASSAY OF URINE CREATININE: CPT | Performed by: INTERNAL MEDICINE

## 2021-01-01 PROCEDURE — 82330 ASSAY OF CALCIUM: CPT | Performed by: NURSE PRACTITIONER

## 2021-01-01 PROCEDURE — 5A1945Z RESPIRATORY VENTILATION, 24-96 CONSECUTIVE HOURS: ICD-10-PCS | Performed by: INTERNAL MEDICINE

## 2021-01-01 PROCEDURE — 82550 ASSAY OF CK (CPK): CPT | Performed by: NURSE PRACTITIONER

## 2021-01-01 PROCEDURE — 96372 THER/PROPH/DIAG INJ SC/IM: CPT

## 2021-01-01 PROCEDURE — 25010000002 PIPERACILLIN SOD-TAZOBACTAM PER 1 G: Performed by: NURSE PRACTITIONER

## 2021-01-01 PROCEDURE — 25010000002 FENTANYL CITRATE (PF) 1000 MCG/20ML SOLUTION: Performed by: NURSE PRACTITIONER

## 2021-01-01 PROCEDURE — 99215 OFFICE O/P EST HI 40 MIN: CPT | Performed by: INTERNAL MEDICINE

## 2021-01-01 PROCEDURE — 93005 ELECTROCARDIOGRAM TRACING: CPT | Performed by: INTERNAL MEDICINE

## 2021-01-01 PROCEDURE — 85610 PROTHROMBIN TIME: CPT | Performed by: NURSE PRACTITIONER

## 2021-01-01 PROCEDURE — 80053 COMPREHEN METABOLIC PANEL: CPT | Performed by: NURSE PRACTITIONER

## 2021-01-01 PROCEDURE — 87205 SMEAR GRAM STAIN: CPT | Performed by: NURSE PRACTITIONER

## 2021-01-01 PROCEDURE — 93325 DOPPLER ECHO COLOR FLOW MAPG: CPT

## 2021-01-01 PROCEDURE — 80053 COMPREHEN METABOLIC PANEL: CPT | Performed by: INTERNAL MEDICINE

## 2021-01-01 PROCEDURE — 85730 THROMBOPLASTIN TIME PARTIAL: CPT | Performed by: NURSE PRACTITIONER

## 2021-01-01 PROCEDURE — 94640 AIRWAY INHALATION TREATMENT: CPT

## 2021-01-01 PROCEDURE — 85007 BL SMEAR W/DIFF WBC COUNT: CPT | Performed by: NURSE PRACTITIONER

## 2021-01-01 PROCEDURE — 93308 TTE F-UP OR LMTD: CPT | Performed by: INTERNAL MEDICINE

## 2021-01-01 PROCEDURE — 25010000002 DEXAMETHASONE PER 1 MG: Performed by: NURSE PRACTITIONER

## 2021-01-01 PROCEDURE — 81001 URINALYSIS AUTO W/SCOPE: CPT | Performed by: NURSE PRACTITIONER

## 2021-01-01 PROCEDURE — 80061 LIPID PANEL: CPT | Performed by: NURSE PRACTITIONER

## 2021-01-01 PROCEDURE — 99233 SBSQ HOSP IP/OBS HIGH 50: CPT | Performed by: INTERNAL MEDICINE

## 2021-01-01 PROCEDURE — 84132 ASSAY OF SERUM POTASSIUM: CPT | Performed by: INTERNAL MEDICINE

## 2021-01-01 PROCEDURE — 93325 DOPPLER ECHO COLOR FLOW MAPG: CPT | Performed by: INTERNAL MEDICINE

## 2021-01-01 PROCEDURE — 93308 TTE F-UP OR LMTD: CPT

## 2021-01-01 PROCEDURE — 83735 ASSAY OF MAGNESIUM: CPT | Performed by: NURSE PRACTITIONER

## 2021-01-01 PROCEDURE — 25010000002 METOCLOPRAMIDE PER 10 MG: Performed by: INTERNAL MEDICINE

## 2021-01-01 PROCEDURE — 99223 1ST HOSP IP/OBS HIGH 75: CPT | Performed by: PSYCHIATRY & NEUROLOGY

## 2021-01-01 PROCEDURE — 83880 ASSAY OF NATRIURETIC PEPTIDE: CPT | Performed by: NURSE PRACTITIONER

## 2021-01-01 PROCEDURE — 63710000001 INSULIN GLARGINE PER 5 UNITS: Performed by: INTERNAL MEDICINE

## 2021-01-01 PROCEDURE — 63710000001 INSULIN LISPRO (HUMAN) PER 5 UNITS: Performed by: INTERNAL MEDICINE

## 2021-01-01 PROCEDURE — 85379 FIBRIN DEGRADATION QUANT: CPT | Performed by: NURSE PRACTITIONER

## 2021-01-01 PROCEDURE — 85025 COMPLETE CBC W/AUTO DIFF WBC: CPT | Performed by: INTERNAL MEDICINE

## 2021-01-01 PROCEDURE — 74018 RADEX ABDOMEN 1 VIEW: CPT

## 2021-01-01 PROCEDURE — 96401 CHEMO ANTI-NEOPL SQ/IM: CPT

## 2021-01-01 PROCEDURE — 85379 FIBRIN DEGRADATION QUANT: CPT | Performed by: INTERNAL MEDICINE

## 2021-01-01 PROCEDURE — 87070 CULTURE OTHR SPECIMN AEROBIC: CPT | Performed by: NURSE PRACTITIONER

## 2021-01-01 PROCEDURE — 63710000001 INSULIN LISPRO (HUMAN) PER 5 UNITS: Performed by: NURSE PRACTITIONER

## 2021-01-01 PROCEDURE — 25010000002 LANREOTIDE ACETATE 120 MG/0.5ML SOLUTION: Performed by: INTERNAL MEDICINE

## 2021-01-01 PROCEDURE — 85384 FIBRINOGEN ACTIVITY: CPT | Performed by: NURSE PRACTITIONER

## 2021-01-01 PROCEDURE — 94002 VENT MGMT INPAT INIT DAY: CPT

## 2021-01-01 PROCEDURE — 25010000002 LORAZEPAM PER 2 MG: Performed by: NURSE PRACTITIONER

## 2021-01-01 PROCEDURE — 84439 ASSAY OF FREE THYROXINE: CPT | Performed by: NURSE PRACTITIONER

## 2021-01-01 PROCEDURE — 76775 US EXAM ABDO BACK WALL LIM: CPT

## 2021-01-01 PROCEDURE — 36415 COLL VENOUS BLD VENIPUNCTURE: CPT

## 2021-01-01 PROCEDURE — 25010000003 POTASSIUM CHLORIDE 10 MEQ/100ML SOLUTION: Performed by: INTERNAL MEDICINE

## 2021-01-01 PROCEDURE — 87040 BLOOD CULTURE FOR BACTERIA: CPT | Performed by: NURSE PRACTITIONER

## 2021-01-01 PROCEDURE — 87641 MR-STAPH DNA AMP PROBE: CPT | Performed by: NURSE PRACTITIONER

## 2021-01-01 PROCEDURE — 87086 URINE CULTURE/COLONY COUNT: CPT | Performed by: NURSE PRACTITIONER

## 2021-01-01 PROCEDURE — 25010000002 MAGNESIUM SULFATE IN D5W 1G/100ML (PREMIX) 1-5 GM/100ML-% SOLUTION: Performed by: NURSE PRACTITIONER

## 2021-01-01 PROCEDURE — 84145 PROCALCITONIN (PCT): CPT | Performed by: NURSE PRACTITIONER

## 2021-01-01 RX ORDER — LANREOTIDE ACETATE 120 MG/.5ML
120 INJECTION SUBCUTANEOUS ONCE
Status: COMPLETED | OUTPATIENT
Start: 2021-01-01 | End: 2021-01-01

## 2021-01-01 RX ORDER — ONDANSETRON 4 MG/1
4 TABLET, FILM COATED ORAL EVERY 6 HOURS PRN
Status: DISCONTINUED | OUTPATIENT
Start: 2021-01-01 | End: 2021-01-01

## 2021-01-01 RX ORDER — DIPHENOXYLATE HYDROCHLORIDE AND ATROPINE SULFATE 2.5; .025 MG/1; MG/1
1 TABLET ORAL
Status: DISCONTINUED | OUTPATIENT
Start: 2021-01-01 | End: 2021-01-01 | Stop reason: HOSPADM

## 2021-01-01 RX ORDER — HEPARIN SODIUM 5000 [USP'U]/ML
5000 INJECTION, SOLUTION INTRAVENOUS; SUBCUTANEOUS EVERY 12 HOURS SCHEDULED
Status: DISCONTINUED | OUTPATIENT
Start: 2021-01-01 | End: 2021-01-01

## 2021-01-01 RX ORDER — GABAPENTIN 300 MG/1
300 CAPSULE ORAL 2 TIMES DAILY
Qty: 60 CAPSULE | Refills: 2 | Status: SHIPPED | OUTPATIENT
Start: 2021-01-01

## 2021-01-01 RX ORDER — ALUMINA, MAGNESIA, AND SIMETHICONE 2400; 2400; 240 MG/30ML; MG/30ML; MG/30ML
15 SUSPENSION ORAL EVERY 6 HOURS PRN
Status: DISCONTINUED | OUTPATIENT
Start: 2021-01-01 | End: 2021-01-01

## 2021-01-01 RX ORDER — BUDESONIDE AND FORMOTEROL FUMARATE DIHYDRATE 160; 4.5 UG/1; UG/1
2 AEROSOL RESPIRATORY (INHALATION)
Status: DISCONTINUED | OUTPATIENT
Start: 2021-01-01 | End: 2021-01-01

## 2021-01-01 RX ORDER — ACETAMINOPHEN 650 MG/1
650 SUPPOSITORY RECTAL EVERY 4 HOURS PRN
Status: DISCONTINUED | OUTPATIENT
Start: 2021-01-01 | End: 2021-01-01 | Stop reason: HOSPADM

## 2021-01-01 RX ORDER — SODIUM CHLORIDE 0.9 % (FLUSH) 0.9 %
10 SYRINGE (ML) INJECTION EVERY 12 HOURS SCHEDULED
Status: DISCONTINUED | OUTPATIENT
Start: 2021-01-01 | End: 2021-01-01

## 2021-01-01 RX ORDER — IPRATROPIUM BROMIDE AND ALBUTEROL SULFATE 2.5; .5 MG/3ML; MG/3ML
3 SOLUTION RESPIRATORY (INHALATION)
Status: DISCONTINUED | OUTPATIENT
Start: 2021-01-01 | End: 2021-01-01

## 2021-01-01 RX ORDER — MORPHINE SULFATE 4 MG/ML
4 INJECTION, SOLUTION INTRAMUSCULAR; INTRAVENOUS
Status: DISCONTINUED | OUTPATIENT
Start: 2021-01-01 | End: 2021-01-01 | Stop reason: HOSPADM

## 2021-01-01 RX ORDER — ASCORBIC ACID 500 MG
500 TABLET ORAL DAILY
Status: DISCONTINUED | OUTPATIENT
Start: 2021-01-01 | End: 2021-01-01

## 2021-01-01 RX ORDER — LORAZEPAM 2 MG/ML
1 INJECTION INTRAMUSCULAR
Status: DISCONTINUED | OUTPATIENT
Start: 2021-01-01 | End: 2021-01-01 | Stop reason: HOSPADM

## 2021-01-01 RX ORDER — INSULIN LISPRO 100 [IU]/ML
0-7 INJECTION, SOLUTION INTRAVENOUS; SUBCUTANEOUS AS NEEDED
Status: DISCONTINUED | OUTPATIENT
Start: 2021-01-01 | End: 2021-01-01

## 2021-01-01 RX ORDER — INSULIN LISPRO 100 [IU]/ML
0-7 INJECTION, SOLUTION INTRAVENOUS; SUBCUTANEOUS EVERY 6 HOURS SCHEDULED
Status: DISCONTINUED | OUTPATIENT
Start: 2021-01-01 | End: 2021-01-01

## 2021-01-01 RX ORDER — PANTOPRAZOLE SODIUM 40 MG/10ML
40 INJECTION, POWDER, LYOPHILIZED, FOR SOLUTION INTRAVENOUS EVERY 12 HOURS SCHEDULED
Status: DISCONTINUED | OUTPATIENT
Start: 2021-01-01 | End: 2021-01-01

## 2021-01-01 RX ORDER — ACETAMINOPHEN 160 MG/5ML
650 SOLUTION ORAL EVERY 4 HOURS PRN
Status: DISCONTINUED | OUTPATIENT
Start: 2021-01-01 | End: 2021-01-01 | Stop reason: HOSPADM

## 2021-01-01 RX ORDER — SODIUM BICARBONATE 650 MG/1
650 TABLET ORAL 3 TIMES DAILY
Status: DISCONTINUED | OUTPATIENT
Start: 2021-01-01 | End: 2021-01-01

## 2021-01-01 RX ORDER — ISOSORBIDE MONONITRATE 30 MG/1
30 TABLET, EXTENDED RELEASE ORAL DAILY
COMMUNITY
Start: 2021-01-01

## 2021-01-01 RX ORDER — INSULIN LISPRO 100 [IU]/ML
0-24 INJECTION, SOLUTION INTRAVENOUS; SUBCUTANEOUS EVERY 6 HOURS SCHEDULED
Status: DISCONTINUED | OUTPATIENT
Start: 2021-01-01 | End: 2021-01-01

## 2021-01-01 RX ORDER — SODIUM CHLORIDE 0.9 % (FLUSH) 0.9 %
10 SYRINGE (ML) INJECTION AS NEEDED
Status: DISCONTINUED | OUTPATIENT
Start: 2021-01-01 | End: 2021-01-01 | Stop reason: HOSPADM

## 2021-01-01 RX ORDER — POTASSIUM CHLORIDE 7.45 MG/ML
10 INJECTION INTRAVENOUS
Status: COMPLETED | OUTPATIENT
Start: 2021-01-01 | End: 2021-01-01

## 2021-01-01 RX ORDER — SODIUM CHLORIDE 9 MG/ML
75 INJECTION, SOLUTION INTRAVENOUS CONTINUOUS
Status: DISCONTINUED | OUTPATIENT
Start: 2021-01-01 | End: 2021-01-01

## 2021-01-01 RX ORDER — NITROGLYCERIN 0.4 MG/1
0.4 TABLET SUBLINGUAL
Status: DISCONTINUED | OUTPATIENT
Start: 2021-01-01 | End: 2021-01-01

## 2021-01-01 RX ORDER — INSULIN LISPRO 100 [IU]/ML
0-9 INJECTION, SOLUTION INTRAVENOUS; SUBCUTANEOUS EVERY 6 HOURS SCHEDULED
Status: DISCONTINUED | OUTPATIENT
Start: 2021-01-01 | End: 2021-01-01

## 2021-01-01 RX ORDER — ALBUTEROL SULFATE 90 UG/1
6 AEROSOL, METERED RESPIRATORY (INHALATION)
Status: DISCONTINUED | OUTPATIENT
Start: 2021-01-01 | End: 2021-01-01

## 2021-01-01 RX ORDER — DEXAMETHASONE SODIUM PHOSPHATE 4 MG/ML
6 INJECTION, SOLUTION INTRA-ARTICULAR; INTRALESIONAL; INTRAMUSCULAR; INTRAVENOUS; SOFT TISSUE DAILY
Status: DISCONTINUED | OUTPATIENT
Start: 2021-01-01 | End: 2021-01-01

## 2021-01-01 RX ORDER — MELATONIN
3000 DAILY
Status: DISCONTINUED | OUTPATIENT
Start: 2021-01-01 | End: 2021-01-01

## 2021-01-01 RX ORDER — ACETAMINOPHEN 325 MG/1
650 TABLET ORAL EVERY 4 HOURS PRN
Status: DISCONTINUED | OUTPATIENT
Start: 2021-01-01 | End: 2021-01-01 | Stop reason: HOSPADM

## 2021-01-01 RX ORDER — ACETAMINOPHEN 650 MG/1
650 SUPPOSITORY RECTAL EVERY 4 HOURS PRN
Status: DISCONTINUED | OUTPATIENT
Start: 2021-01-01 | End: 2021-01-01

## 2021-01-01 RX ORDER — DEXAMETHASONE SODIUM PHOSPHATE 4 MG/ML
6 INJECTION, SOLUTION INTRA-ARTICULAR; INTRALESIONAL; INTRAMUSCULAR; INTRAVENOUS; SOFT TISSUE EVERY 12 HOURS
Status: DISCONTINUED | OUTPATIENT
Start: 2021-01-01 | End: 2021-01-01

## 2021-01-01 RX ORDER — MIDAZOLAM HYDROCHLORIDE 1 MG/ML
2 INJECTION INTRAMUSCULAR; INTRAVENOUS EVERY 4 HOURS PRN
Status: DISCONTINUED | OUTPATIENT
Start: 2021-01-01 | End: 2021-01-01

## 2021-01-01 RX ORDER — SCOLOPAMINE TRANSDERMAL SYSTEM 1 MG/1
1 PATCH, EXTENDED RELEASE TRANSDERMAL
Status: DISCONTINUED | OUTPATIENT
Start: 2021-01-01 | End: 2021-01-01 | Stop reason: HOSPADM

## 2021-01-01 RX ORDER — MIDAZOLAM HYDROCHLORIDE 1 MG/ML
2 INJECTION INTRAMUSCULAR; INTRAVENOUS
Status: DISCONTINUED | OUTPATIENT
Start: 2021-01-01 | End: 2021-01-01

## 2021-01-01 RX ORDER — ATROPINE SULFATE 10 MG/ML
2 SOLUTION/ DROPS OPHTHALMIC 2 TIMES DAILY PRN
Status: DISCONTINUED | OUTPATIENT
Start: 2021-01-01 | End: 2021-01-01 | Stop reason: HOSPADM

## 2021-01-01 RX ORDER — DEXTROSE MONOHYDRATE 25 G/50ML
25 INJECTION, SOLUTION INTRAVENOUS
Status: DISCONTINUED | OUTPATIENT
Start: 2021-01-01 | End: 2021-01-01

## 2021-01-01 RX ORDER — INSULIN LISPRO 100 [IU]/ML
0-24 INJECTION, SOLUTION INTRAVENOUS; SUBCUTANEOUS AS NEEDED
Status: DISCONTINUED | OUTPATIENT
Start: 2021-01-01 | End: 2021-01-01

## 2021-01-01 RX ORDER — FUROSEMIDE 10 MG/ML
80 INJECTION INTRAMUSCULAR; INTRAVENOUS ONCE
Status: COMPLETED | OUTPATIENT
Start: 2021-01-01 | End: 2021-01-01

## 2021-01-01 RX ORDER — FENTANYL CITRATE-0.9 % NACL/PF 10 MCG/ML
50-300 PLASTIC BAG, INJECTION (ML) INTRAVENOUS
Status: DISCONTINUED | OUTPATIENT
Start: 2021-01-01 | End: 2021-01-01

## 2021-01-01 RX ORDER — CLOPIDOGREL BISULFATE 75 MG/1
75 TABLET ORAL DAILY
Status: DISCONTINUED | OUTPATIENT
Start: 2021-01-01 | End: 2021-01-01

## 2021-01-01 RX ORDER — LANREOTIDE ACETATE 120 MG/.5ML
120 INJECTION SUBCUTANEOUS ONCE
Status: CANCELLED | OUTPATIENT
Start: 2021-01-01

## 2021-01-01 RX ORDER — CHLORHEXIDINE GLUCONATE 0.12 MG/ML
15 RINSE ORAL EVERY 12 HOURS SCHEDULED
Status: DISCONTINUED | OUTPATIENT
Start: 2021-01-01 | End: 2021-01-01

## 2021-01-01 RX ORDER — ACETAMINOPHEN 325 MG/1
650 TABLET ORAL EVERY 4 HOURS PRN
Status: DISCONTINUED | OUTPATIENT
Start: 2021-01-01 | End: 2021-01-01

## 2021-01-01 RX ORDER — ZINC SULFATE 50(220)MG
220 CAPSULE ORAL DAILY
Status: DISCONTINUED | OUTPATIENT
Start: 2021-01-01 | End: 2021-01-01

## 2021-01-01 RX ORDER — NICOTINE POLACRILEX 4 MG
15 LOZENGE BUCCAL
Status: DISCONTINUED | OUTPATIENT
Start: 2021-01-01 | End: 2021-01-01

## 2021-01-01 RX ORDER — ASPIRIN 300 MG/1
300 SUPPOSITORY RECTAL DAILY
Status: DISCONTINUED | OUTPATIENT
Start: 2021-01-01 | End: 2021-01-01

## 2021-01-01 RX ORDER — SODIUM CHLORIDE 9 MG/ML
100 INJECTION, SOLUTION INTRAVENOUS CONTINUOUS
Status: DISCONTINUED | OUTPATIENT
Start: 2021-01-01 | End: 2021-01-01

## 2021-01-01 RX ORDER — ONDANSETRON 2 MG/ML
4 INJECTION INTRAMUSCULAR; INTRAVENOUS EVERY 6 HOURS PRN
Status: DISCONTINUED | OUTPATIENT
Start: 2021-01-01 | End: 2021-01-01

## 2021-01-01 RX ORDER — INSULIN LISPRO 100 [IU]/ML
0-9 INJECTION, SOLUTION INTRAVENOUS; SUBCUTANEOUS AS NEEDED
Status: DISCONTINUED | OUTPATIENT
Start: 2021-01-01 | End: 2021-01-01

## 2021-01-01 RX ORDER — METOCLOPRAMIDE HYDROCHLORIDE 5 MG/ML
5 INJECTION INTRAMUSCULAR; INTRAVENOUS EVERY 8 HOURS SCHEDULED
Status: DISCONTINUED | OUTPATIENT
Start: 2021-01-01 | End: 2021-01-01

## 2021-01-01 RX ORDER — INSULIN GLARGINE 100 [IU]/ML
15 INJECTION, SOLUTION SUBCUTANEOUS EVERY 12 HOURS SCHEDULED
Status: DISCONTINUED | OUTPATIENT
Start: 2021-01-01 | End: 2021-01-01

## 2021-01-01 RX ORDER — CARBOXYMETHYLCELLULOSE SODIUM 10 MG/ML
1 GEL OPHTHALMIC
Status: DISCONTINUED | OUTPATIENT
Start: 2021-01-01 | End: 2021-01-01 | Stop reason: HOSPADM

## 2021-01-01 RX ORDER — MAGNESIUM SULFATE 1 G/100ML
1 INJECTION INTRAVENOUS ONCE
Status: COMPLETED | OUTPATIENT
Start: 2021-01-01 | End: 2021-01-01

## 2021-01-01 RX ORDER — ASPIRIN 81 MG/1
324 TABLET, CHEWABLE ORAL DAILY
Status: DISCONTINUED | OUTPATIENT
Start: 2021-01-01 | End: 2021-01-01

## 2021-01-01 RX ADMIN — CLOPIDOGREL BISULFATE 75 MG: 75 TABLET ORAL at 09:02

## 2021-01-01 RX ADMIN — PANTOPRAZOLE SODIUM 40 MG: 40 INJECTION, POWDER, FOR SOLUTION INTRAVENOUS at 20:42

## 2021-01-01 RX ADMIN — Medication 3000 UNITS: at 08:24

## 2021-01-01 RX ADMIN — BUDESONIDE AND FORMOTEROL FUMARATE DIHYDRATE 2 PUFF: 160; 4.5 AEROSOL RESPIRATORY (INHALATION) at 07:21

## 2021-01-01 RX ADMIN — ALBUTEROL SULFATE 6 PUFF: 108 AEROSOL, METERED RESPIRATORY (INHALATION) at 07:10

## 2021-01-01 RX ADMIN — SODIUM BICARBONATE 100 MEQ: 84 INJECTION, SOLUTION INTRAVENOUS at 00:50

## 2021-01-01 RX ADMIN — SODIUM BICARBONATE 650 MG TABLET 650 MG: at 16:31

## 2021-01-01 RX ADMIN — INSULIN LISPRO 7 UNITS: 100 INJECTION, SOLUTION INTRAVENOUS; SUBCUTANEOUS at 11:28

## 2021-01-01 RX ADMIN — CHLORHEXIDINE GLUCONATE 0.12% ORAL RINSE 15 ML: 1.2 LIQUID ORAL at 09:02

## 2021-01-01 RX ADMIN — SODIUM BICARBONATE 100 MEQ: 84 INJECTION, SOLUTION INTRAVENOUS at 14:13

## 2021-01-01 RX ADMIN — INSULIN LISPRO 12 UNITS: 100 INJECTION, SOLUTION INTRAVENOUS; SUBCUTANEOUS at 06:16

## 2021-01-01 RX ADMIN — ALBUTEROL SULFATE 6 PUFF: 108 AEROSOL, METERED RESPIRATORY (INHALATION) at 03:19

## 2021-01-01 RX ADMIN — GUAIFENESIN 400 MG: 100 SOLUTION ORAL at 11:26

## 2021-01-01 RX ADMIN — GUAIFENESIN 400 MG: 100 SOLUTION ORAL at 05:54

## 2021-01-01 RX ADMIN — SODIUM BICARBONATE 650 MG TABLET 650 MG: at 11:26

## 2021-01-01 RX ADMIN — PANTOPRAZOLE SODIUM 40 MG: 40 INJECTION, POWDER, FOR SOLUTION INTRAVENOUS at 08:23

## 2021-01-01 RX ADMIN — ASPIRIN 324 MG: 81 TABLET, CHEWABLE ORAL at 08:26

## 2021-01-01 RX ADMIN — FENTANYL CITRATE 100 MCG/HR: 0.05 INJECTION, SOLUTION INTRAMUSCULAR; INTRAVENOUS at 01:49

## 2021-01-01 RX ADMIN — GUAIFENESIN 400 MG: 100 SOLUTION ORAL at 02:21

## 2021-01-01 RX ADMIN — POTASSIUM CHLORIDE 10 MEQ: 7.46 INJECTION, SOLUTION INTRAVENOUS at 11:26

## 2021-01-01 RX ADMIN — BUDESONIDE AND FORMOTEROL FUMARATE DIHYDRATE 2 PUFF: 160; 4.5 AEROSOL RESPIRATORY (INHALATION) at 07:10

## 2021-01-01 RX ADMIN — CHLORHEXIDINE GLUCONATE 0.12% ORAL RINSE 15 ML: 1.2 LIQUID ORAL at 20:42

## 2021-01-01 RX ADMIN — ALBUTEROL SULFATE 6 PUFF: 108 AEROSOL, METERED RESPIRATORY (INHALATION) at 11:10

## 2021-01-01 RX ADMIN — HEPARIN SODIUM 5000 UNITS: 5000 INJECTION INTRAVENOUS; SUBCUTANEOUS at 09:02

## 2021-01-01 RX ADMIN — OXYCODONE HYDROCHLORIDE AND ACETAMINOPHEN 500 MG: 500 TABLET ORAL at 08:26

## 2021-01-01 RX ADMIN — FENTANYL CITRATE 50 MCG/HR: 0.05 INJECTION, SOLUTION INTRAMUSCULAR; INTRAVENOUS at 17:29

## 2021-01-01 RX ADMIN — PIPERACILLIN AND TAZOBACTAM 3.38 G: 3; .375 INJECTION, POWDER, LYOPHILIZED, FOR SOLUTION INTRAVENOUS at 17:57

## 2021-01-01 RX ADMIN — GUAIFENESIN 400 MG: 100 SOLUTION ORAL at 01:33

## 2021-01-01 RX ADMIN — DEXAMETHASONE SODIUM PHOSPHATE 6 MG: 4 INJECTION, SOLUTION INTRAMUSCULAR; INTRAVENOUS at 11:27

## 2021-01-01 RX ADMIN — ALBUTEROL SULFATE 6 PUFF: 108 AEROSOL, METERED RESPIRATORY (INHALATION) at 14:55

## 2021-01-01 RX ADMIN — CHLORHEXIDINE GLUCONATE 0.12% ORAL RINSE 15 ML: 1.2 LIQUID ORAL at 20:16

## 2021-01-01 RX ADMIN — DEXAMETHASONE SODIUM PHOSPHATE 6 MG: 4 INJECTION, SOLUTION INTRAMUSCULAR; INTRAVENOUS at 22:43

## 2021-01-01 RX ADMIN — Medication 3000 UNITS: at 09:01

## 2021-01-01 RX ADMIN — PIPERACILLIN AND TAZOBACTAM 3.38 G: 3; .375 INJECTION, POWDER, LYOPHILIZED, FOR SOLUTION INTRAVENOUS at 20:22

## 2021-01-01 RX ADMIN — GUAIFENESIN 400 MG: 100 SOLUTION ORAL at 17:11

## 2021-01-01 RX ADMIN — OXYCODONE HYDROCHLORIDE AND ACETAMINOPHEN 500 MG: 500 TABLET ORAL at 09:02

## 2021-01-01 RX ADMIN — FENTANYL CITRATE 250 MCG/HR: 0.05 INJECTION, SOLUTION INTRAMUSCULAR; INTRAVENOUS at 10:14

## 2021-01-01 RX ADMIN — BUDESONIDE AND FORMOTEROL FUMARATE DIHYDRATE 2 PUFF: 160; 4.5 AEROSOL RESPIRATORY (INHALATION) at 23:19

## 2021-01-01 RX ADMIN — INSULIN LISPRO 12 UNITS: 100 INJECTION, SOLUTION INTRAVENOUS; SUBCUTANEOUS at 11:27

## 2021-01-01 RX ADMIN — GUAIFENESIN 400 MG: 100 SOLUTION ORAL at 17:01

## 2021-01-01 RX ADMIN — SODIUM BICARBONATE 100 MEQ: 84 INJECTION, SOLUTION INTRAVENOUS at 02:58

## 2021-01-01 RX ADMIN — FUROSEMIDE 80 MG: 10 INJECTION, SOLUTION INTRAMUSCULAR; INTRAVENOUS at 11:27

## 2021-01-01 RX ADMIN — ALBUTEROL SULFATE 6 PUFF: 108 AEROSOL, METERED RESPIRATORY (INHALATION) at 23:19

## 2021-01-01 RX ADMIN — ZINC SULFATE 220 MG (50 MG) CAPSULE 220 MG: CAPSULE at 08:24

## 2021-01-01 RX ADMIN — POTASSIUM CHLORIDE 10 MEQ: 7.46 INJECTION, SOLUTION INTRAVENOUS at 12:34

## 2021-01-01 RX ADMIN — SODIUM CHLORIDE 75 ML/HR: 9 INJECTION, SOLUTION INTRAVENOUS at 17:29

## 2021-01-01 RX ADMIN — CHLORHEXIDINE GLUCONATE 0.12% ORAL RINSE 15 ML: 1.2 LIQUID ORAL at 08:23

## 2021-01-01 RX ADMIN — GUAIFENESIN 400 MG: 100 SOLUTION ORAL at 06:13

## 2021-01-01 RX ADMIN — INSULIN LISPRO 4 UNITS: 100 INJECTION, SOLUTION INTRAVENOUS; SUBCUTANEOUS at 06:06

## 2021-01-01 RX ADMIN — INSULIN LISPRO 16 UNITS: 100 INJECTION, SOLUTION INTRAVENOUS; SUBCUTANEOUS at 01:39

## 2021-01-01 RX ADMIN — POTASSIUM CHLORIDE 10 MEQ: 7.46 INJECTION, SOLUTION INTRAVENOUS at 10:08

## 2021-01-01 RX ADMIN — FENTANYL CITRATE 100 MCG/HR: 0.05 INJECTION, SOLUTION INTRAMUSCULAR; INTRAVENOUS at 14:13

## 2021-01-01 RX ADMIN — ALBUTEROL SULFATE 6 PUFF: 108 AEROSOL, METERED RESPIRATORY (INHALATION) at 07:21

## 2021-01-01 RX ADMIN — ALBUTEROL SULFATE 6 PUFF: 108 AEROSOL, METERED RESPIRATORY (INHALATION) at 14:44

## 2021-01-01 RX ADMIN — POTASSIUM CHLORIDE 10 MEQ: 7.46 INJECTION, SOLUTION INTRAVENOUS at 14:03

## 2021-01-01 RX ADMIN — MAGNESIUM SULFATE HEPTAHYDRATE 1 G: 1 INJECTION, SOLUTION INTRAVENOUS at 22:51

## 2021-01-01 RX ADMIN — Medication 10 ML: at 20:17

## 2021-01-01 RX ADMIN — GUAIFENESIN 400 MG: 100 SOLUTION ORAL at 11:27

## 2021-01-01 RX ADMIN — FENTANYL CITRATE 100 MCG/HR: 0.05 INJECTION, SOLUTION INTRAMUSCULAR; INTRAVENOUS at 04:30

## 2021-01-01 RX ADMIN — HEPARIN SODIUM 5000 UNITS: 5000 INJECTION INTRAVENOUS; SUBCUTANEOUS at 23:04

## 2021-01-01 RX ADMIN — PIPERACILLIN AND TAZOBACTAM 3.38 G: 3; .375 INJECTION, POWDER, LYOPHILIZED, FOR SOLUTION INTRAVENOUS at 00:15

## 2021-01-01 RX ADMIN — PIPERACILLIN AND TAZOBACTAM 3.38 G: 3; .375 INJECTION, POWDER, LYOPHILIZED, FOR SOLUTION INTRAVENOUS at 09:02

## 2021-01-01 RX ADMIN — LORAZEPAM 1 MG: 2 INJECTION INTRAMUSCULAR; INTRAVENOUS at 18:45

## 2021-01-01 RX ADMIN — ALBUTEROL SULFATE 6 PUFF: 108 AEROSOL, METERED RESPIRATORY (INHALATION) at 11:14

## 2021-01-01 RX ADMIN — HEPARIN SODIUM 5000 UNITS: 5000 INJECTION INTRAVENOUS; SUBCUTANEOUS at 08:24

## 2021-01-01 RX ADMIN — INSULIN LISPRO 4 UNITS: 100 INJECTION, SOLUTION INTRAVENOUS; SUBCUTANEOUS at 17:02

## 2021-01-01 RX ADMIN — METOCLOPRAMIDE HYDROCHLORIDE 5 MG: 5 INJECTION INTRAMUSCULAR; INTRAVENOUS at 13:12

## 2021-01-01 RX ADMIN — Medication 600 MG: at 08:26

## 2021-01-01 RX ADMIN — MORPHINE SULFATE 4 MG: 4 INJECTION INTRAVENOUS at 18:44

## 2021-01-01 RX ADMIN — Medication 10 ML: at 20:45

## 2021-01-01 RX ADMIN — FENTANYL CITRATE 250 MCG/HR: 0.05 INJECTION, SOLUTION INTRAMUSCULAR; INTRAVENOUS at 14:05

## 2021-01-01 RX ADMIN — POTASSIUM CHLORIDE 10 MEQ: 7.46 INJECTION, SOLUTION INTRAVENOUS at 02:18

## 2021-01-01 RX ADMIN — MIDAZOLAM 2 MG: 1 INJECTION INTRAMUSCULAR; INTRAVENOUS at 09:22

## 2021-01-01 RX ADMIN — Medication 10 ML: at 09:02

## 2021-01-01 RX ADMIN — Medication 600 MG: at 20:16

## 2021-01-01 RX ADMIN — GLYCOPYRROLATE 0.4 MG: 0.2 INJECTION, SOLUTION INTRAMUSCULAR; INTRAVITREAL at 18:44

## 2021-01-01 RX ADMIN — DEXAMETHASONE SODIUM PHOSPHATE 6 MG: 4 INJECTION, SOLUTION INTRAMUSCULAR; INTRAVENOUS at 23:19

## 2021-01-01 RX ADMIN — ALBUTEROL SULFATE 6 PUFF: 108 AEROSOL, METERED RESPIRATORY (INHALATION) at 20:20

## 2021-01-01 RX ADMIN — Medication 600 MG: at 02:21

## 2021-01-01 RX ADMIN — ASPIRIN 324 MG: 81 TABLET, CHEWABLE ORAL at 09:02

## 2021-01-01 RX ADMIN — INSULIN LISPRO 3 UNITS: 100 INJECTION, SOLUTION INTRAVENOUS; SUBCUTANEOUS at 17:46

## 2021-01-01 RX ADMIN — POTASSIUM CHLORIDE 10 MEQ: 7.46 INJECTION, SOLUTION INTRAVENOUS at 00:03

## 2021-01-01 RX ADMIN — ALBUTEROL SULFATE 6 PUFF: 108 AEROSOL, METERED RESPIRATORY (INHALATION) at 03:23

## 2021-01-01 RX ADMIN — POTASSIUM CHLORIDE 10 MEQ: 7.46 INJECTION, SOLUTION INTRAVENOUS at 22:55

## 2021-01-01 RX ADMIN — Medication 600 MG: at 09:01

## 2021-01-01 RX ADMIN — PIPERACILLIN AND TAZOBACTAM 3.38 G: 3; .375 INJECTION, POWDER, LYOPHILIZED, FOR SOLUTION INTRAVENOUS at 08:23

## 2021-01-01 RX ADMIN — LANREOTIDE ACETATE 120 MG: 120 INJECTION SUBCUTANEOUS at 11:49

## 2021-01-01 RX ADMIN — ALBUTEROL SULFATE 6 PUFF: 108 AEROSOL, METERED RESPIRATORY (INHALATION) at 00:01

## 2021-01-01 RX ADMIN — PANTOPRAZOLE SODIUM 40 MG: 40 INJECTION, POWDER, FOR SOLUTION INTRAVENOUS at 20:16

## 2021-01-01 RX ADMIN — INSULIN GLARGINE 15 UNITS: 100 INJECTION, SOLUTION SUBCUTANEOUS at 11:28

## 2021-01-01 RX ADMIN — SODIUM CHLORIDE 100 ML/HR: 9 INJECTION, SOLUTION INTRAVENOUS at 11:26

## 2021-01-01 RX ADMIN — HEPARIN SODIUM 5000 UNITS: 5000 INJECTION INTRAVENOUS; SUBCUTANEOUS at 20:16

## 2021-01-01 RX ADMIN — BUDESONIDE AND FORMOTEROL FUMARATE DIHYDRATE 2 PUFF: 160; 4.5 AEROSOL RESPIRATORY (INHALATION) at 20:23

## 2021-01-01 RX ADMIN — CLOPIDOGREL BISULFATE 75 MG: 75 TABLET ORAL at 08:26

## 2021-01-01 RX ADMIN — MIDAZOLAM 2 MG: 1 INJECTION INTRAMUSCULAR; INTRAVENOUS at 00:34

## 2021-01-01 RX ADMIN — INSULIN LISPRO 16 UNITS: 100 INJECTION, SOLUTION INTRAVENOUS; SUBCUTANEOUS at 17:11

## 2021-01-01 RX ADMIN — PANTOPRAZOLE SODIUM 40 MG: 40 INJECTION, POWDER, FOR SOLUTION INTRAVENOUS at 09:02

## 2021-01-01 RX ADMIN — ZINC SULFATE 220 MG (50 MG) CAPSULE 220 MG: CAPSULE at 09:02

## 2021-01-01 RX ADMIN — Medication 10 ML: at 08:26

## 2021-01-05 NOTE — PROGRESS NOTES
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 female who was diagnosed with pulmonary nodules on the left side in 2015.  December 2017 PET/CT showed a dominant left sided lingular mass.  It was biopsied 1/2018 and positive for synaptophysin and focally positive for chromogranin.  On 2/15/2018, through  [facility] she has a PET/CT DOTA-ZAMBRANO Ga-68 which showed uptake within the mid body of the pancreas and bilateral pulmonary nodules, including a 3.5 cm left lower lobe mass consistent with metastatic disease.  She was seen by Dr. vIis Manrique on 02/20/2018  And started on Lanreotide monthly.  Per med/onc charting, she has been non-adherent with her appointments secondary to travel/social issues and was transferred care to [facility] 03/19/2020.  She did have PET/CT 1/31/2020 at [facility]: 1.2 x 1.5 cm right pulmonary nodule, SUV 1.97; multiple tiny and small metastatic lesions scattered elsewhere throughout the right lung, non-cavitary pulmonary nodules identified in the lingula and left lower lobe with SUV of 6, mass size 4.2 x 3.5 x 4 cm. Enlarged 1.7x 1.7 x 1.5 cm metabolic adenpathy in the left side of the mediastinum, SUV 4.42. Pulmonary ligament 5.7 x 7 cm mass, stable metastatic adenopathy in the subccarinal distribution 3.25 x 2.5 cm, SUV 3.29.  She was reinitiated on Lanreotide 120 mg on 04/08/2020.     She began having chest pain and repeat CT CAP was ordered on 06/10/2020 and It noted increase in size of the masses in the lungs with compression of pulmonary vasculature and cardiac abutment.      · Ms. Heck was diagnosed with pulmonary nodules dominant on the left side in 2015.  ·   · December 2017 patient underwent PET CT scan which showed dominant  lingular mass that was seen on the prior chest CT on January 8, 2018 which was hypermetabolic consistent with malignancy.  · January 2018 left-sided lung nodule was biopsied and was positive for synaptophysin and focally positive for chromogranin.  · 2/15/2018 PET/CT DOTA-ZAMBRANO revealed focal gallium-68 dotatate uptake within the mid body of the pancreas and bilateral pulmonary nodules including a 3.5 cm left lower lobe more consistent with metastatic disease.  · 2/20/2018 patient was initiated on lanreotide monthly at the [facility] under the care of Dr. Ivis Manrique  · Patient has been noncompliant with her appointments secondary to travel issues and social issues and patient was sent to the [facility] as a transfer from [facility] and seen initially on 3/19/2020.  · 1/31/2020 patient underwent PET CT scan for follow-up of CT scan of the chest that was done at [facility].  PET/CT was done at the [facility] which revealed an nodule in the thyroid gland in the left lobe with an SUV of 2.3 of 2 x 1.5 x 2.5 cm.  In the chest and the right upper lobe pulmonary nodule 1.2 x 1 x 1.5 cm with activity of 1.97.  There are multiple additional tiny and small pulmonary metastatic lesions scattered elsewhere throughout the right lung.  There are areas of non-cavitary pulmonary nodularity identified in the lingula and the left lower lobe are also mild and moderately hypermetabolic with a maximum SUV of 6 which corresponds to the 4.25 x 3.5 x 4 cm pulmonary mass.  There are other tiny non-cavitary lesions in the left upper lobe.  Enlarged mildly metabolic adenopathy left side of the mediastinum 1.75 x 1.75 x 1.5 cm with SUV of 4.42.  Metastatic lymph node at the left.  Pulmonary ligament within a severe 5.83 and size of 5.75 x 4.5 x 7 cm.  Stable metastatic adenopathy in the subcarinal distribution 3.25 x 2.25 x 2.5 cm with an SUV of 3.29.  Abdomen and pelvis scan was  benign.  · 4/8/20/2020-patient reinitiated on lanreotide 120 mg to be given every 4 weeks  · 4/15/2020: Urine 5 HIAA 24-hour 3.5 normal.  24-hour.  · 6/10/2020 CT soft tissue neck chest abdomen and pelvis with contrast were obtained, most of the disease was stable with mild increase in size of the masses in the lungs.  · 6/3/2020: CEA 4.8, WBC 8.4, hemoglobin 13.1, MCV 88.4, platelets 151, creatinine 0.84, LFTs normal, NSE 4.9 normal,  · 7/1/2020-7/15/2020: Patient received total of 3000 cGy, 300 cGy per fraction.  · 8/3/2020: WBC 5.7, hemoglobin 12.7, platelet count 124 low, MCV 87.5, LFTs normal  · 8/3/2020: Patient received Somatuline- lanreotide 120 mg.  · 10/14/2020: CT chest abdomen pelvis: Slight improvement with decrease size of subcarinal mass and decrease size of masslike consolidations in the lingula.  Other nodular densities in the lungs are unchanged.  Findings consistent with metastatic disease.  No evidence of any metastatic disease in the abdomen or pelvis.  Mild fatty infiltration of the liver.  · 10/14/2020: Patient care transferred to Dr. Farooq    SUBJECTIVE:      Patient is my office for follow-up of her neuroendocrine tumor following CT scans.  Discussed the CT scan results which show improvement.  This is the first time I am seeing her.  Reports shortness of air.  Also continues to report chest pain which I feel appears to be musculoskeletal.  Patient does not want to take pain medication secondary to her responsibilities at home. .  She continues to have significant shortness of air.  Patient does have significant social issues which makes it very hard for her to travel to the appointments.   Also continues to have symptoms of chest pain on and off and does not see any cardiologist currently.      Past Medical History:   Diagnosis Date   • Anxiety    • CAD (coronary artery disease)    • CHF (congestive heart failure) (CMS/HCC)    • Diabetes mellitus (CMS/HCC)    • GERD (gastroesophageal  reflux disease)    • HLD (hyperlipidemia)    • Hypertension    • Muscle ache     Muscle ache/cramps   • Neuroendocrine carcinoma (CMS/HCC)    • Neuropathy    • Spinal stenosis     Chronic back pain       Past Surgical History:   Procedure Laterality Date   • CARDIAC CATHETERIZATION     • CARDIAC CATHETERIZATION N/A 11/12/2020    Procedure: Left Heart Cath;  Surgeon: Brynn Gutierrez MD;  Location: [facility] CATH INVASIVE LOCATION;  Service: Cardiovascular;  Laterality: N/A;   • CARDIAC CATHETERIZATION N/A 11/12/2020    Procedure: Coronary angiography;  Surgeon: Brynn Gutierrez MD;  Location: [facility] CATH INVASIVE LOCATION;  Service: Cardiovascular;  Laterality: N/A;   • CARDIAC CATHETERIZATION N/A 11/12/2020    Procedure: Left ventriculography;  Surgeon: Brynn Gutierrez MD;  Location: [facility] CATH INVASIVE LOCATION;  Service: Cardiovascular;  Laterality: N/A;   • CHOLECYSTECTOMY     • CORONARY STENT PLACEMENT           Current Outpatient Medications:   •  amitriptyline (ELAVIL) 25 MG tablet, Take 25 mg by mouth every night at bedtime., Disp: , Rfl:   •  baclofen (LIORESAL) 20 MG tablet, Take 40 mg by mouth Daily., Disp: , Rfl:   •  budesonide-formoterol (SYMBICORT) 80-4.5 MCG/ACT inhaler, Inhale 2 puffs 2 (Two) Times a Day., Disp: 6.9 g, Rfl: 12  •  clopidogrel (PLAVIX) 75 MG tablet, Take 75 mg by mouth Daily., Disp: , Rfl:   •  glipizide (GLUCOTROL) 5 MG tablet, Take 5 mg by mouth Daily., Disp: , Rfl:   •  JANUMET XR  MG tablet, Take 1 tablet by mouth Daily., Disp: , Rfl:   •  lisinopril-hydrochlorothiazide (PRINZIDE,ZESTORETIC) 10-12.5 MG per tablet, Take 1 tablet by mouth Daily., Disp: , Rfl:   •  meloxicam (MOBIC) 7.5 MG tablet, Take 7.5 mg by mouth Daily., Disp: , Rfl:   •  oxyCODONE-acetaminophen (PERCOCET)  MG per tablet, Take 1 tablet by mouth Every 4 (Four) Hours As Needed., Disp: , Rfl:   •  potassium chloride (K-DUR,KLOR-CON) 20 MEQ CR tablet, Take 20 mEq by mouth Daily.,  Disp: , Rfl:   •  rosuvastatin (CRESTOR) 10 MG tablet, Take 10 mg by mouth Daily., Disp: , Rfl:   •  sucralfate (CARAFATE) 1 g tablet, Take 1 g by mouth 4 (Four) Times a Day., Disp: , Rfl:   •  TRAZODONE HCL PO, Take 100 mg by mouth Every Night., Disp: , Rfl:   •  VENTOLIN  (90 Base) MCG/ACT inhaler, Inhale 2 puffs Every 4 (Four) Hours As Needed., Disp: , Rfl:     Allergies   Allergen Reactions   • Ambien [Zolpidem] Mental Status Change       Family History   Problem Relation Age of Onset   • Lung cancer Mother    • Lung cancer Sister        Cancer-related family history includes Lung cancer in her mother and sister.    Social History     Tobacco Use   • Smoking status: Never Smoker   • Smokeless tobacco: Never Used   Substance Use Topics   • Alcohol use: Never     Frequency: Never   • Drug use: Never       I have reviewed the history of present illness, past medical history, family history, social history, lab results, all notes and other records since the patient was last seen at the cancer care center.          Objective:     LMP  (LMP Unknown)     There were no vitals filed for this visit.  Temperature 97.3, pulse is 89, respirations 18 blood pressures 129/76    PHYSICAL EXAM:      Physical Exam   Constitutional: She is oriented to person, place, and time. No distress.   Moderately built morbidly obese   HENT:   Head: Normocephalic and atraumatic.   Eyes: Conjunctivae are normal. Right eye exhibits no discharge. Left eye exhibits no discharge. No scleral icterus.   Neck: Normal range of motion. Neck supple. No thyromegaly present.   Cardiovascular: Normal rate, regular rhythm, normal heart sounds and normal pulses. Exam reveals no gallop and no friction rub.   Pulmonary/Chest: Effort normal. No stridor. No respiratory distress. She has no wheezes.   Distant breath sounds bilaterally.  Tenderness on palpation of the midsternal region..  Has chest pain.   Abdominal: Soft. Bowel sounds are normal. She  exhibits no mass. There is no abdominal tenderness. There is no rebound and no guarding.   Musculoskeletal: Normal range of motion. No tenderness.      Comments: 1+ lower extremity edema   Lymphadenopathy:     She has no cervical adenopathy.   Neurological: She is alert and oriented to person, place, and time. She exhibits normal muscle tone.   Skin: Skin is warm. No rash noted. She is not diaphoretic. No erythema.   Psychiatric: Her behavior is normal.   Nursing note and vitals reviewed.     Physical exam done by MD.    RECENT LABS:       Lab Results - Last 18 Months   Lab Units 12/08/20  1102 11/10/20  1014 08/03/20  1351   WBC 10*3/mm3 7.60 9.62 5.72   HEMOGLOBIN g/dL 13.9 13.1 12.7   HEMATOCRIT % 43.4 39.4 37.9   PLATELETS 10*3/mm3 155 196 124*   MCV fL 85.8 85.5 87.5     Lab Results - Last 18 Months   Lab Units 12/08/20  1102 11/10/20  1014 10/14/20  1355 08/03/20  1351 06/30/20  1516   SODIUM mmol/L 141 139  --  140 141   POTASSIUM mmol/L 3.6 3.9  --  4.1 3.9   CHLORIDE mmol/L 106 103  --  103 103   CO2 mmol/L 24.0 27.3  --  23.0 23.0   BUN mg/dL 17 18  --  19 17   CREATININE mg/dL 0.77 0.85 0.60 0.79 0.79   CALCIUM mg/dL 10.4 10.6*  --  10.4 9.9   BILIRUBIN mg/dL 0.5  --   --  0.7 0.5   ALK PHOS U/L 63  --   --  53 63   ALT (SGPT) U/L 12  --   --  20 23   AST (SGOT) U/L 20  --   --  25 27   GLUCOSE mg/dL 77 148*  --  177* 165*       Lab Results   Component Value Date    GLUCOSE 77 12/08/2020    BUN 17 12/08/2020    CREATININE 0.77 12/08/2020    EGFRIFNONA 74 12/08/2020    BCR 22.1 12/08/2020    K 3.6 12/08/2020    CO2 24.0 12/08/2020    CALCIUM 10.4 12/08/2020    ALBUMIN 3.80 12/08/2020    AST 20 12/08/2020    ALT 12 12/08/2020     No results found for: IRON, TIBC, FERRITIN  No results found for: FOLATE  No results found for: OCCULTBLD  No results found for: RETICCTPCT  No results found for: GMNTJRAW27, TSH  No results found for: SPEP, UPEP  No results found for: LDH, URICACID  No results found for: PEARL, RF,  SEDRATE  No results found for: FIBRINOGEN, HAPTOGLOBIN, DDIMER  No results found for: DDIMER  Lab Results   Component Value Date    INR 1.02 11/10/2020     No results found for:   Lab Results   Component Value Date    CEA 4.84 06/03/2020     No results found for:   No results found for: PSA  No results found for: [id number]    Assessment/Plan      ASSESSMENT:     1. Metastatic pancreatic neuroendocrine tumor: CT scan chest abdomen pelvis 10/14/2020 shows improvement with a decrease in the size of the subcarinal mass and decrease size of masslike consolidations in the lingula.  2. Lung metastatic disease: Lesion had cardiac abutment.  Status post radiation therapy.  30 Gy in 10 fractions.  Improvement after radiation.  3. Chest pain/tenderness: Patient continues to have symptoms, despite radiation therapy.  Chest pain is aggravated by movement.  She has very intense tenderness on palpation of the midsternal region.  I wonder if this is costochondritis.  4. Thyroid nodule  5. Coronary artery disease and CHF  6. Type 2 diabetes  7. Medical noncompliance: Due to her social issues.  8. Dyspnea: Could be COPD  9. Insomnia  10. Left jaw pain  11. ECOG 2  12. Psychosocial: Patient has several challenges at her home.  Her daughter has substance abuse.  Patient is taking care of her 3 grandchildren.    PLAN:      Patient seen for the first time.  Entire chart reviewed.    13. She is missing her lanreotide injections.    Doses 120 mcg that will be administered every 4 weeks.  Advised patient compliance with the treatment.  We will schedule this.  14. Status post radiation therapy to the left symptomatic lung lesion.  CT scan shows response.  15. For symptoms of persistent chest pain, cardiology consultation with Dr. Gutierrez.  She would benefit from cardiac testing to rule out angina/ ischemic heart disease.  I discussed with the patient that her chest pain could be somatic in nature.  Dr. Ma will see her at [place]  office which is close to her.  16. medical noncompliance mainly related to transportation.    Our  is on board.  17. Persistent shortness of air: She has signs of bronchospasm.  Will prescribe Symbicort.  18. We will follow-up with her in 6 weeks    I have reviewed labs results, imaging, vitals, and medications with the patient today.   Patient verbalized understanding and is in agreement of the above plan.    I have reviewed and confirmed the accuracy of the patient's history: Chief complaint, HPI, ROS and Subjective as entered by the MA/LPN/RN. Amanda Bowens MA 01/05/21         Electronically signed by Mason Farooq MD, 10/18/20, 4:14 PM EDT.

## 2021-01-11 NOTE — PROGRESS NOTES
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 female who was diagnosed with pulmonary nodules on the left side in 2015.  December 2017 PET/CT showed a dominant left sided lingular mass.  It was biopsied 1/2018 and positive for synaptophysin and focally positive for chromogranin.  On 2/15/2018, through  [facility] she has a PET/CT DOTA-ZAMBRANO Ga-68 which showed uptake within the mid body of the pancreas and bilateral pulmonary nodules, including a 3.5 cm left lower lobe mass consistent with metastatic disease.  She was seen by Dr. Ivis Manrique on 02/20/2018  And started on Lanreotide monthly.  Per med/onc charting, she has been non-adherent with her appointments secondary to travel/social issues and was transferred care to [facility] 03/19/2020.  She did have PET/CT 1/31/2020 at [facility]: 1.2 x 1.5 cm right pulmonary nodule, SUV 1.97; multiple tiny and small metastatic lesions scattered elsewhere throughout the right lung, non-cavitary pulmonary nodules identified in the lingula and left lower lobe with SUV of 6, mass size 4.2 x 3.5 x 4 cm. Enlarged 1.7x 1.7 x 1.5 cm metabolic adenpathy in the left side of the mediastinum, SUV 4.42. Pulmonary ligament 5.7 x 7 cm mass, stable metastatic adenopathy in the subccarinal distribution 3.25 x 2.5 cm, SUV 3.29.  She was reinitiated on Lanreotide 120 mg on 04/08/2020.     She began having chest pain and repeat CT CAP was ordered on 06/10/2020 and It noted increase in size of the masses in the lungs with compression of pulmonary vasculature and cardiac abutment.      · Ms. Heck was diagnosed with pulmonary nodules dominant on the left side in 2015.  · December 2017 patient underwent PET CT scan which showed dominant lingular  mass that was seen on the prior chest CT on January 8, 2018 which was hypermetabolic consistent with malignancy.  · January 2018 left-sided lung nodule was biopsied and was positive for synaptophysin and focally positive for chromogranin.  · 2/15/2018 PET/CT DOTA-ZAMBRANO revealed focal gallium-68 dotatate uptake within the mid body of the pancreas and bilateral pulmonary nodules including a 3.5 cm left lower lobe more consistent with metastatic disease.  · 2/20/2018 patient was initiated on lanreotide monthly at the [email or web address]] under the care of Dr. Ivis Manrique  · Patient has been noncompliant with her appointments secondary to travel issues and social issues and patient was sent to the [facility] as a transfer from [facility] [[email or web address] and seen initially on 3/19/2020.  · 1/31/2020 patient underwent PET CT scan for follow-up of CT scan of the chest that was done at [facility].  PET/CT was done at the [facility] [[email or web address]] which revealed an nodule in the thyroid gland in the left lobe with an SUV of 2.3 of 2 x 1.5 x 2.5 cm.  In the chest and the right upper lobe pulmonary nodule 1.2 x 1 x 1.5 cm with activity of 1.97.  There are multiple additional tiny and small pulmonary metastatic lesions scattered elsewhere throughout the right lung.  There are areas of non-cavitary pulmonary nodularity identified in the lingula and the left lower lobe are also mild and moderately hypermetabolic with a maximum SUV of 6 which corresponds to the 4.25 x 3.5 x 4 cm pulmonary mass.  There are other tiny non-cavitary lesions in the left upper lobe.  Enlarged mildly metabolic adenopathy left side of the mediastinum 1.75 x 1.75 x 1.5 cm with SUV of 4.42.  Metastatic lymph node at the left.  Pulmonary ligament within a severe 5.83 and size of 5.75 x 4.5 x 7 cm.  Stable metastatic adenopathy in the subcarinal distribution 3.25 x 2.25 x 2.5 cm with an SUV of 3.29.  Abdomen and pelvis scan was  benign.  · 4/8/20/2020-patient reinitiated on lanreotide 120 mg to be given every 4 weeks  · 4/15/2020: Urine 5 HIAA 24-hour 3.5 normal.  24-hour.  · 6/10/2020 CT soft tissue neck chest abdomen and pelvis with contrast were obtained, most of the disease was stable with mild increase in size of the masses in the lungs.  · 6/3/2020: CEA 4.8, WBC 8.4, hemoglobin 13.1, MCV 88.4, platelets 151, creatinine 0.84, LFTs normal, NSE 4.9 normal,  · 7/1/2020-7/15/2020: Patient received total of 3000 cGy, 300 cGy per fraction.  · 8/3/2020: WBC 5.7, hemoglobin 12.7, platelet count 124 low, MCV 87.5, LFTs normal  · 8/3/2020: Patient received Somatuline- lanreotide 120 mg.  · 10/14/2020: CT chest abdomen pelvis: Slight improvement with decrease size of subcarinal mass and decrease size of masslike consolidations in the lingula.  Other nodular densities in the lungs are unchanged.  Findings consistent with metastatic disease.  No evidence of any metastatic disease in the abdomen or pelvis.  Mild fatty infiltration of the liver.  · 10/14/2020: Patient care transferred to Dr. Fraooq  · 12/8/2020: Patient received lanreotide 120 mg, WBC 7.6, hemoglobin 13.9, platelets 155, creatinine 0.7  · 1/11/2021: Lanreotide 120 mg given.  WBC 6.3, hemoglobin 12.3, platelets 122    SUBJECTIVE:      Patient is my office for follow-up of her neuroendocrine tumor following CT scans.  Patient received her lanreotide injection today.  Last CT scans were in October which showed improvement.    Reports pain in both her lower extremities mostly in the right which she thinks is from arthritis.  She is on a wheelchair.  She does not want to see orthopedics.  Also continues to report chest pain which I feel appears to be musculoskeletal.  .  Patient does have significant social issues which makes it very hard for her to travel to the appointments.   Also continues to have symptoms of chest pain on and off and does not see any cardiologist currently.      Past  Medical History:   Diagnosis Date   • Anxiety    • CAD (coronary artery disease)    • CHF (congestive heart failure) (CMS/[facility])    • Diabetes mellitus (CMS/[facility])    • GERD (gastroesophageal reflux disease)    • HLD (hyperlipidemia)    • Hypertension    • Muscle ache     Muscle ache/cramps   • Neuroendocrine carcinoma (CMS/[facility])    • Neuropathy    • Spinal stenosis     Chronic back pain       Past Surgical History:   Procedure Laterality Date   • CARDIAC CATHETERIZATION     • CARDIAC CATHETERIZATION N/A 11/12/2020    Procedure: Left Heart Cath;  Surgeon: Brynn Gutierrez MD;  Location: [facility] CATH INVASIVE LOCATION;  Service: Cardiovascular;  Laterality: N/A;   • CARDIAC CATHETERIZATION N/A 11/12/2020    Procedure: Coronary angiography;  Surgeon: Brynn Gutierrez MD;  Location:  [facility] CATH INVASIVE LOCATION;  Service: Cardiovascular;  Laterality: N/A;   • CARDIAC CATHETERIZATION N/A 11/12/2020    Procedure: Left ventriculography;  Surgeon: Brynn Gutierrez MD;  Location: [facility] CATH INVASIVE LOCATION;  Service: Cardiovascular;  Laterality: N/A;   • CHOLECYSTECTOMY     • CORONARY STENT PLACEMENT           Current Outpatient Medications:   •  amitriptyline (ELAVIL) 25 MG tablet, Take 25 mg by mouth every night at bedtime., Disp: , Rfl:   •  baclofen (LIORESAL) 20 MG tablet, Take 40 mg by mouth Daily., Disp: , Rfl:   •  budesonide-formoterol (SYMBICORT) 80-4.5 MCG/ACT inhaler, Inhale 2 puffs 2 (Two) Times a Day., Disp: 6.9 g, Rfl: 12  •  clopidogrel (PLAVIX) 75 MG tablet, Take 75 mg by mouth Daily., Disp: , Rfl:   •  glipizide (GLUCOTROL) 5 MG tablet, Take 5 mg by mouth Daily., Disp: , Rfl:   •  isosorbide mononitrate (IMDUR) 30 MG 24 hr tablet, Take 30 mg by mouth Daily., Disp: , Rfl:   •  JANUMET XR  MG tablet, Take 1 tablet by mouth Daily., Disp: , Rfl:   •  lisinopril-hydrochlorothiazide (PRINZIDE,ZESTORETIC) 10-12.5 MG per tablet, Take 1 tablet by mouth Daily., Disp: , Rfl:   •  meloxicam  (MOBIC) 7.5 MG tablet, Take 7.5 mg by mouth Daily., Disp: , Rfl:   •  oxyCODONE-acetaminophen (PERCOCET)  MG per tablet, Take 1 tablet by mouth Every 4 (Four) Hours As Needed., Disp: , Rfl:   •  potassium chloride (K-DUR,KLOR-CON) 20 MEQ CR tablet, Take 20 mEq by mouth Daily., Disp: , Rfl:   •  rosuvastatin (CRESTOR) 10 MG tablet, Take 10 mg by mouth Daily., Disp: , Rfl:   •  sucralfate (CARAFATE) 1 g tablet, Take 1 g by mouth 4 (Four) Times a Day., Disp: , Rfl:   •  TRAZODONE HCL PO, Take 100 mg by mouth Every Night., Disp: , Rfl:   •  VENTOLIN  (90 Base) MCG/ACT inhaler, Inhale 2 puffs Every 4 (Four) Hours As Needed., Disp: , Rfl:   No current facility-administered medications for this visit.     Allergies   Allergen Reactions   • Ambien [Zolpidem] Mental Status Change       Family History   Problem Relation Age of Onset   • Lung cancer Mother    • Lung cancer Sister        Cancer-related family history includes Lung cancer in her mother and sister.    Social History     Tobacco Use   • Smoking status: Never Smoker   • Smokeless tobacco: Never Used   Substance Use Topics   • Alcohol use: Never     Frequency: Never   • Drug use: Never       I have reviewed the history of present illness, past medical history, family history, social history, lab results, all notes and other records since the patient was last seen at the cancer care center.        ROS:      Review of Systems   Constitutional: Positive for fatigue. Negative for activity change, appetite change and fever.   HENT: Negative for nosebleeds and trouble swallowing.    Eyes: Negative for visual disturbance.   Respiratory: Positive for shortness of breath . Negative for cough.    Cardiovascular: Positive for chest pain.  Tenderness.       Midsternal.  It appears positional, with tenderness.   Gastrointestinal: Negative for abdominal pain and blood in stool.   Endocrine: Negative for cold intolerance.   Genitourinary: Negative for dysuria and  "hematuria.   Musculoskeletal: Negative for joint swelling.   Skin: Negative for rash.   Allergic/Immunologic: Negative for environmental allergies.   Neurological: Negative for seizures.   Hematological: Does not bruise/bleed easily.   Psychiatric/Behavioral: Positive for dysphoric mood. The patient is nervous/anxious (.bhesig).      MD performed ROS and are negative except as mentioned in Subjective.    Objective:     /85   Pulse 80   Temp 97.7 °F (36.5 °C)   Resp 16   Ht 154.9 cm (61\")   Wt 86.2 kg (190 lb)   LMP  (LMP Unknown)   Breastfeeding No   BMI 35.90 kg/m²     Vitals:    01/11/21 1043   BP: 137/85   Pulse: 80   Resp: 16   Temp: 97.7 °F (36.5 °C)   Weight: 86.2 kg (190 lb)   Height: 154.9 cm (61\")         PHYSICAL EXAM:      Physical Exam   Constitutional: She is oriented to person, place, and time. No distress.   Moderately built morbidly obese   HENT:   Head: Normocephalic and atraumatic.   Cardiovascular: Normal rate, regular rhythm, normal heart sounds and normal pulses. Exam reveals no gallop and no friction rub.   Pulmonary/Chest: Effort normal. No stridor. No respiratory distress. She has no wheezes.   Distant breath sounds bilaterally.  Tenderness on palpation of the midsternal region..  Has chest pain.   Abdominal: Soft. Bowel sounds are normal. She exhibits no mass. There is no abdominal tenderness. There is no rebound and no guarding.   Musculoskeletal: Normal range of motion. No tenderness.      Comments: 1+ lower extremity edema   Lymphadenopathy:     She has no cervical adenopathy.   Neurological: She is alert and oriented to person, place, and time. She exhibits normal muscle tone.   Skin: Skin is warm. No rash noted. She is not diaphoretic. No erythema.   Psychiatric: Her behavior is normal.       RECENT LABS:       Lab Results - Last 18 Months   Lab Units 01/11/21  1048 12/08/20  1102 11/10/20  1014   WBC 10*3/mm3 6.31 7.60 9.62   HEMOGLOBIN g/dL 12.3 13.9 13.1   HEMATOCRIT % " 39.0 43.4 39.4   PLATELETS 10*3/mm3 122* 155 196   MCV fL 87.1 85.8 85.5     Lab Results - Last 18 Months   Lab Units 12/08/20  1102 11/10/20  1014 10/14/20  1355 08/03/20  1351 06/30/20  1516   SODIUM mmol/L 141 139  --  140 141   POTASSIUM mmol/L 3.6 3.9  --  4.1 3.9   CHLORIDE mmol/L 106 103  --  103 103   CO2 mmol/L 24.0 27.3  --  23.0 23.0   BUN mg/dL 17 18  --  19 17   CREATININE mg/dL 0.77 0.85 0.60 0.79 0.79   CALCIUM mg/dL 10.4 10.6*  --  10.4 9.9   BILIRUBIN mg/dL 0.5  --   --  0.7 0.5   ALK PHOS U/L 63  --   --  53 63   ALT (SGPT) U/L 12  --   --  20 23   AST (SGOT) U/L 20  --   --  25 27   GLUCOSE mg/dL 77 148*  --  177* 165*       Lab Results   Component Value Date    GLUCOSE 77 12/08/2020    BUN 17 12/08/2020    CREATININE 0.77 12/08/2020    EGFRIFNONA 74 12/08/2020    BCR 22.1 12/08/2020    K 3.6 12/08/2020    CO2 24.0 12/08/2020    CALCIUM 10.4 12/08/2020    ALBUMIN 3.80 12/08/2020    AST 20 12/08/2020    ALT 12 12/08/2020     No results found for: IRON, TIBC, FERRITIN  No results found for: FOLATE  No results found for: OCCULTBLD  No results found for: RETICCTPCT  No results found for: EDJORDWY30, TSH  No results found for: SPEP, UPEP  No results found for: LDH, URICACID  No results found for: PEARL, RF, SEDRATE  No results found for: FIBRINOGEN, HAPTOGLOBIN, DDIMER  No results found for: DDIMER  Lab Results   Component Value Date    INR 1.02 11/10/2020     No results found for:   Lab Results   Component Value Date    CEA 4.84 06/03/2020     No results found for:   No results found for: PSA  No results found for: [id number]    Assessment/Plan      ASSESSMENT:     1. Metastatic pancreatic neuroendocrine tumor: CT scan chest abdomen pelvis 10/14/2020 shows improvement with a decrease in the size of the subcarinal mass and decrease size of masslike consolidations in the lingula.  2. Lung metastatic disease: Lesion had cardiac abutment.  Status post radiation therapy.  30 Gy in 10 fractions.   Improvement after radiation.  3. History of coronary artery disease:Moderate obstructive disease involving the mid LAD plan to manage medically at this time. Patent stent in the mid right coronary artery with mild in-stent restenosis. Normal LV systolic function/Normal wall motion.  Status post evaluation by cardiology and they feel her chest pain is related to musculoskeletal pain.  4. Chest pain/tenderness: Suspect costochondritis.  Status post evaluation by cardiology who feels pain is  musculoskeletal.  Patient continues to have symptoms, despite radiation therapy.  Chest pain is aggravated by movement.  She has very intense tenderness on palpation of the midsternal region.  I  5. Thyroid nodule  6. Coronary artery disease and CHF  7. Type 2 diabetes  8. History of medical noncompliance: Due to her social issues.  9. Dyspnea: Could be COPD  10. Insomnia  11. Bilateral knee pain right greater than left.  12. ECOG 2  13. Psychosocial: Patient has several challenges at her home.  Her daughter has substance abuse.  Patient is taking care of her 3 grandchildren.    PLAN:          14. Reviewed office notes from cardiology.  And also Dr. Serna.  15. Continue lanreotide injections.  120 mcg every 4 weeks.  Encourage compliance.    16. We will start gabapentin 300 p.o. twice daily for her musculoskeletal pain.    17. Discontinue amitriptyline as she is not taking it.  18. Advised her to take her meloxicam as needed for her knee pains as well as her sternal pain.    19. Continue follow-up with cardiology.    20. Continue Symbicort.  21. Repeat CT scan of the chest with contrast in about 3 months and follow-up  22. Follow-up in 3 months    I have reviewed labs results, imaging, vitals, and medications with the patient today.   Patient verbalized understanding and is in agreement of the above plan.    I have reviewed and confirmed the accuracy of the patient's history: Chief complaint, HPI, ROS and Subjective as entered by  the MA/LPN/RN. Mason Farooq MD 01/11/21     I have spent greater than 40 minutes on patient encounter and more than 50% of the time was spent in counseling and coordination of care.  Significant amount of time during encounter was spent on data review including review of labs, imaging, pathology and also formulating treatment/management/surveillance plan.  Discontinue    Electronically signed by Mason Farooq MD, 01/11/21, 1:06 PM EST.

## 2021-01-21 NOTE — TELEPHONE ENCOUNTER
I returned call to pt to let her know when next appointment with our office is, pt verbalized understanding of date/time. When talking to pt she was having difficulty breathing and moaning. I asked her if she was ok an she stated no that she has been having chest pain that radiates under her arm into ribs along with difficulty breathing. I advised her that it would be best for her to go to ER to be evaluated. Pt states that she doesn't want to go to ER for fear of catching COVID. I again advised her that her difficulty breathing/pain out weighs that risk. She also states that she is raising her 3 grandchildren that need her home. I than offered for her to come into office today to be evaluated, she states she doesn't have a car an relies on medical transport for rides. Earliest that would be is Monday per pt. Pt also states that she has a hard time getting around her house an has to crawl up and down her stairs. I spoke to her about the safety risk of this. I asked her if anyone was home with her now, she states her grand daughter Juliet. I also spoke with Juliet the importance of calling 911 if her grandmothers breathing worsens. Juliet states that she is going to call pt's PCP to see if they can do a video visit today. I also offered to call an ambulance to come to pt's home to evaluate her with refusal from pt. Christiane promised she would call me back after they called PCP to let me know if she gets an telephone visit today.     I will speak with Cathy Schwartz  in regards to the safety risks pt is dealing with daily.

## 2021-02-03 PROBLEM — K27.9 PUD (PEPTIC ULCER DISEASE): Status: ACTIVE | Noted: 2021-01-01

## 2021-02-03 PROBLEM — J44.1 COPD WITH ACUTE EXACERBATION (HCC): Status: ACTIVE | Noted: 2021-01-01

## 2021-02-03 PROBLEM — M19.90 CHRONIC OSTEOARTHRITIS: Status: ACTIVE | Noted: 2021-01-01

## 2021-02-03 PROBLEM — E87.6 HYPOKALEMIA: Status: ACTIVE | Noted: 2021-01-01

## 2021-02-03 PROBLEM — S22.22XA CLOSED FRACTURE OF BODY OF STERNUM: Status: ACTIVE | Noted: 2021-01-01

## 2021-02-03 PROBLEM — Z72.0 TOBACCO ABUSE: Status: ACTIVE | Noted: 2021-01-01

## 2021-02-03 PROBLEM — K92.1 MELENA: Status: ACTIVE | Noted: 2021-01-01

## 2021-02-03 PROBLEM — G89.29 BACK PAIN, CHRONIC: Status: ACTIVE | Noted: 2021-01-01

## 2021-02-03 PROBLEM — E11.9 TYPE 2 DIABETES MELLITUS, WITHOUT LONG-TERM CURRENT USE OF INSULIN (HCC): Status: ACTIVE | Noted: 2021-01-01

## 2021-02-03 PROBLEM — D68.9 COAGULOPATHY (HCC): Status: ACTIVE | Noted: 2021-01-01

## 2021-02-03 PROBLEM — I25.10 ARTERIOSCLEROSIS OF CORONARY ARTERY: Status: ACTIVE | Noted: 2021-01-01

## 2021-02-03 PROBLEM — R05.8 PRODUCTIVE COUGH: Status: ACTIVE | Noted: 2021-01-01

## 2021-02-03 PROBLEM — J96.01 ACUTE RESPIRATORY FAILURE WITH HYPOXIA (HCC): Status: ACTIVE | Noted: 2021-01-01

## 2021-02-03 PROBLEM — E66.9 OBESITY (BMI 30-39.9): Status: ACTIVE | Noted: 2018-02-26

## 2021-02-03 PROBLEM — M54.9 BACK PAIN, CHRONIC: Status: ACTIVE | Noted: 2021-01-01

## 2021-02-03 PROBLEM — F32.A DEPRESSIVE DISORDER: Status: ACTIVE | Noted: 2021-01-01

## 2021-02-03 PROBLEM — N17.9 ACUTE KIDNEY INJURY (HCC): Status: ACTIVE | Noted: 2021-01-01

## 2021-02-03 PROBLEM — I50.9 CONGESTIVE HEART FAILURE (HCC): Status: ACTIVE | Noted: 2021-01-01

## 2021-02-03 PROBLEM — R06.02 SHORTNESS OF BREATH: Status: RESOLVED | Noted: 2020-01-01 | Resolved: 2021-01-01

## 2021-02-03 PROBLEM — N39.0 ACUTE URINARY TRACT INFECTION: Status: ACTIVE | Noted: 2021-01-01

## 2021-02-03 PROBLEM — S22.43XA CLOSED FRACTURE OF MULTIPLE RIBS OF BOTH SIDES: Status: ACTIVE | Noted: 2021-01-01

## 2021-02-03 PROBLEM — R77.8 ELEVATED TROPONIN: Status: ACTIVE | Noted: 2021-01-01

## 2021-02-03 PROBLEM — E78.5 HYPERLIPIDEMIA: Status: ACTIVE | Noted: 2021-01-01

## 2021-02-03 PROBLEM — J44.1 COPD EXACERBATION (HCC): Status: ACTIVE | Noted: 2021-01-01

## 2021-02-03 PROBLEM — E83.42 HYPOMAGNESEMIA: Status: ACTIVE | Noted: 2021-01-01

## 2021-02-03 PROBLEM — E04.1 THYROID NODULE: Status: ACTIVE | Noted: 2021-01-01

## 2021-02-03 PROBLEM — E78.5 HYPERLIPIDEMIA: Chronic | Status: ACTIVE | Noted: 2021-01-01

## 2021-02-03 PROBLEM — R91.8 MULTIPLE PULMONARY NODULES: Status: ACTIVE | Noted: 2021-01-01

## 2021-02-03 PROBLEM — R06.02 SHORTNESS OF BREATH: Status: ACTIVE | Noted: 2020-01-01

## 2021-02-03 PROBLEM — K72.00 SHOCK LIVER: Status: ACTIVE | Noted: 2021-01-01

## 2021-02-03 PROBLEM — G93.40 ACUTE ENCEPHALOPATHY: Status: ACTIVE | Noted: 2021-01-01

## 2021-02-03 PROBLEM — R79.89 LOW SERUM THYROID STIMULATING HORMONE (TSH): Status: ACTIVE | Noted: 2021-01-01

## 2021-02-03 PROBLEM — R07.9 CHEST PAIN: Status: ACTIVE | Noted: 2021-01-01

## 2021-02-03 PROBLEM — R05.8 PRODUCTIVE COUGH: Status: RESOLVED | Noted: 2021-01-01 | Resolved: 2021-01-01

## 2021-02-03 PROBLEM — Z79.01 LONG TERM CURRENT USE OF ANTICOAGULANT: Status: ACTIVE | Noted: 2021-01-01

## 2021-02-03 PROBLEM — R65.21 SEPTIC SHOCK (HCC): Status: ACTIVE | Noted: 2021-01-01

## 2021-02-03 PROBLEM — I46.9 CARDIOPULMONARY ARREST WITH SUCCESSFUL RESUSCITATION (HCC): Status: ACTIVE | Noted: 2021-01-01

## 2021-02-03 PROBLEM — E87.20 LACTIC ACIDOSIS: Status: ACTIVE | Noted: 2021-01-01

## 2021-02-03 PROBLEM — A41.9 SEPTIC SHOCK (HCC): Status: ACTIVE | Noted: 2021-01-01

## 2021-02-03 PROBLEM — C34.90 MALIGNANT NEOPLASM OF LUNG (HCC): Status: ACTIVE | Noted: 2021-01-01

## 2021-02-03 PROBLEM — J18.9 CAP (COMMUNITY ACQUIRED PNEUMONIA): Status: ACTIVE | Noted: 2021-01-01

## 2021-02-03 PROBLEM — U07.1 COVID-19 VIRUS DETECTED: Status: ACTIVE | Noted: 2021-01-01

## 2021-02-03 NOTE — H&P
PULMONARY/CRITICAL CARE HISTORY & PHYSICAL       PATIENT NAME:     Christiane Heck  :     1950  MRN:     [id number]  ROOM:     [facility] ICU /     PRIMARY CARE PHYSICIAN:  Gloria Barron APRN    SUBJECTIVE     CHIEF COMPLAINT:   Cardiopulmonary arrest at [facility]    HISTORY OF PRESENT ILLNESS:  Christiane Heck is a 71 y.o. 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  The following has been culminated from review of [facility] facility documentation, as patient presents to this facility intubated, sedated with no family currently present.  Additional past medical history includes stage IV lung cancer.  Patient initially presented to [facility] via EMS with a 1 day history of headache, progressive shortness of breath, coughing, vomiting.  EMS upon their arrival stated that patient's oxygen saturation was 50%.  Patient was immediately started on BiPAP with 100% FiO2 with an improvement in oxygen saturation to the 70s.  At that time, patient was alert and responsive.  In route, patient became bradycardic with IV atropine administered.  Patient then followed with unresponsiveness and asystole on monitor.  2 rounds of IV epinephrine was administered with CPR.  Upon arrival to [facility]  "facility, patient continued in asystole, in which CPR continued.  Patient initially had a supraglottic airway upon presentation.  Additional IV epinephrine was administered along with aggressive IV fluid bolusing.  Initial heart rhythm was asystole, and upon pulse check, patient was in PEA.  Patient was then intubated.  Patient had a total of 3 additional rounds of epinephrine, and then ROSC was obtained.  Patient remained unresponsive, with fixed and dilated pupils.  Initially patient's oxygen saturation was 75, but after patient was placed on the ventilator, oxygen saturation improved to 96% with a FiO2 of 100%.  Patient was discussed with patient's family, in which patient is a full code.  It is reported that patient lives with her 15-year-old, 13-year-old, and 11-year-old grandchildren, who were the ones that called EMS.    In the ED, labs were obtained with labs as follows: WBC 21.95, hemoglobin 14.6, hematocrit 49.9, platelets 154, alkaline phosphatase 113, , , sodium 140, potassium 3.2, chloride 97, CO2 14.7, BUN 24, creatinine 2.13, glucose 517, troponin 1.3, lactate 8.7.  Patient had a urinalysis completed which revealed 3+ bacteria and urine culture was reflexed.  Patient also was given 2 L of IV fluids, Rocephin 2 g, Lasix 20 mg, sodium bicarbonate x1 amp.  Chest x-ray reveals \"diffuse bilateral lung opacities with some partial scarring of the upper left lung.  Lung infiltrates are increased from previous exam of earlier today.  Probable left pleural effusion.\"  CT PE protocol was also obtained with the following findings: \"No central pulmonary embolism, aortic aneurysm or dissection.  Minimally displaced fracture of the inferior sternal body with multiple bilateral anterior rib fractures.  Multiple pulmonary nodules with a left inferior hilar mass and mediastinal adenopathy similar to slightly increased in size from comparison exam.  Superimposed airspace opacity seen in the left upper lobe " "anteriorly and left lower lobe suspicious for superimposed atelectasis and infiltrate.  Correlate clinically and interval follow-up can document resolution.  Mosaic attenuation seen bilaterally with relative toxicity of vasculature in the lucent regions favoring air trapping and diffuse small airway disease.\"  CT of the head was obtained with the following impression: \"Study is limited by motion and resultant streak artifact.  Within these limitations, there is no CT evidence for acute intracranial hemorrhage or acute territorial infarction.  Initially, [facility] was contacted for transfer to ICU for further treatment and evaluation of patient condition.  Initial plans were for potential TTM, as patient did remain unresponsive, but unfortunately there were delays in transfer and outlying facility did not initiate hypothermia measures, and patient's ROSC was greater than 6 hours outside the window for current evidence-based recommendations.    Pulmonary/Intensivist service was contacted for admission to ICU and further evaluation and treatment.      REVIEW OF SYSTEMS:  Review of systems could not be obtained due to   patient unresponsive. patient intubated.      ASSESSMENT & PLAN     Principal Problem:    Cardiopulmonary arrest with successful resuscitation (CMS/HCC)  Active Problems:    Obesity (BMI 30-39.9)    CAD (coronary artery disease)    Chronic congestive heart failure, with preserved ejection fraction    Malignant neoplasm of lung, stage IV    Shock liver    Coagulopathy, secondary to shock liver    Acute kidney injury (CMS/HCC)    Hypokalemia    Hypomagnesemia    Closed fracture of multiple ribs of both sides, secondary to CPR    Closed, minimally displaced, fracture of body of sternum, secondary to CPR    Acute encephalopathy, concern for anoxic injury    Low serum thyroid stimulating hormone (TSH)    Lactic acidosis    Elevated troponin, possible NSTEMI    COVID-19 virus detected    Type 2 " diabetes mellitus, without long-term current use of insulin (CMS/[facility])    Septic shock (CMS/[facility])    Acute urinary tract infection    CAP (community acquired pneumonia)    Acute respiratory failure with hypoxia (CMS/[facility])    COPD with acute exacerbation (CMS/[facility])     PLAN:  Cardiopulmonary arrest with successful resuscitation  CAD, S/P Cardiac stent  Elevated troponin, possible NSTEMI  -Etiology of cardiac arrest unknown, but suspicious for hypoxia related  -CT PE protocol was obtained and negative  -Monitor serial troponins  -ASA daily, Plavix; unsure if patient is currently taking Plavix, but was recently on Plavix, will resume for now  -On cardiac monitoring  -Cardiology consulted  -TTM considered, but was awaiting CT head and CT PE protocol, which was to be completed prior to transfer, however, patient was unable to be transferred to this facility until outside the window of current evidence-based recommendations, eliminating patient is a candidate for TTM    Septic shock  Acute urinary tract infection  CAP (community acquired pneumonia)  COVID-19 virus detected  Lactic acidosis  -2 L of IV fluids given at [facility]  -Continue IVFs as ordered  -initially on Samuel-Synephrine at [facility], currently normotensive  -monitor serial lactic acid levels until normalized  -blood cultures-pending  -UA-3+ bacteria at [facility], culture was reflexed, follow [facility] culture results  -COVID-19 positive at [facility], new diagnosis on 2/30/2021  -CRP 18.66, procalcitonin 14.49 on admission  -Rocephin given at [facility], will expand to Zosyn for better coverage of probable pneumonia  -Concern for possible aspiration given patient's prolonged resuscitation  -Sputum culture-pending  -Repeat UA at this facility-pending  -Urine antigens-pending  -Follow Covid 19 progression labs as ordered  -Due to elevated liver function tests, patient not a candidate for Ivermectin or Remdesivir;  monitor LFTs to see if improvement will allow for med administration  -Continue Vitamin A, vitamin D, zinc, acetylcysteine, and vitamin C  -Avoiding colchicine due to elevated LFTs.  -Decadron, titrate based on patient's clinical status  -Infectious disease consulted     Acute respiratory failure with hypoxia  COPD with acute exacerbation  -Likely secondary to COVID-19 infection, complicated by hypoxia  -Initial presentation upon EMS arrival, oxygen saturation in the 50s  -Ventilator management, wean FiO2 and PEEP as tolerated  -Monitor ABGs and chest x-rays  -Likely multifactorial, related to cardiopulmonary arrest, COVID-19 infection, and pneumonia  -IV steroids as above  -Albuterol HFA, Symbicort scheduled and as needed  [facility]  -Ventilator mismatch, abdominal breathing, started on fentanyl for sedation    Shock liver  Coagulopathy, secondary to shock liver  -Etiology likely secondary to cardiopulmonary arrest  -On admission, alkaline phosphatase 136, ALT 1169, AST 3783  -Monitor and trend labs  -Consider GI consultation    Primary malignant neuroendocrine tumor  Secondary malignant neoplasm of lung, stage IV  -Does not appear to have had any further treatment since 4/1/2020, was being seen at [facility]  -Consider hematology/oncology consultation    Peptic ulcer disease  Melena, on admission  -Check Hemoccult stool  -Hemoglobin stable  -Protonix twice daily, for now  -Patient is high risk for VTE given COVID-19 diagnosis, will start patient on heparin subcu every 12 hours  -Monitor patient's hemoglobin, will discontinue antiplatelets and anticoagulation if evidence of bleeding, currently benefit is greater than risk    Acute kidney injury  -BUN 31, creatinine 2.13 on admission  -Monitor and trend labs  -Avoid hypotension, NSAIDs, and nephrotoxic medications  -Nephrology consulted    Hypokalemia  -Replacement ordered  -Monitor and trend  labs    Hypomagnesemia  -Replacement ordered  -Monitor and trend labs    Closed fracture of multiple ribs of both sides, secondary to CPR  Closed, minimally displaced, fracture of body of sternum, secondary to CPR  -Findings on outlying facility CT  -Further recommendations as indicated, currently stable    Acute encephalopathy, concern for anoxic injury  -CT of the head was negative for intracranial abnormalities  -Patient continues unresponsive, and was not on sedation prior to arrival to this facility  -Likely very poor prognosis  -Consider neurology consultation    Low serum thyroid stimulating hormone (TSH)  -Will recheck thyroid studies in AM    Type 2 diabetes mellitus, without long-term current use of insulin  -strict glycemic control recommended  -Accuchecks AC/HS or Q6H with sliding scale insulin, based on diet  -Hold oral diabetic medications until out of ICU.    Palliative care consultation in AM  NG Placed, dietitian consult for tube feed recommendations.    Code Status: CPR, Full Interventions  VTE Prophylaxis: Heparin SC, transition per COVID 19 recommendations  PUD Prophylaxis: Protonix      HOSPITAL MEDICATIONS     SCHEDULED MEDICATIONS:  Acetylcysteine, 600 mg, Per G Tube, BID  [START ON 2/4/2021] ascorbic acid, 500 mg, Oral, Daily  aspirin, 324 mg, Per G Tube, Daily  chlorhexidine, 15 mL, Mouth/Throat, Q12H  [START ON 2/4/2021] cholecalciferol, 3,000 Units, Oral, Daily  dexamethasone, 6 mg, Intravenous, Daily  heparin (porcine), 5,000 Units, Subcutaneous, Q12H  insulin lispro, 0-7 Units, Subcutaneous, Q6H  pantoprazole, 40 mg, Intravenous, Q12H  [START ON 2/4/2021] piperacillin-tazobactam, 3.375 g, Intravenous, Q8H  potassium chloride, 10 mEq, Intravenous, Q1H  sodium chloride, 10 mL, Intravenous, Q12H  [START ON 2/4/2021] zinc sulfate, 220 mg, Oral, Daily         CONTINUOUS INFUSIONS:    fentanyl 10 mcg/mL,  mcg/hr, Last Rate: 50 mcg/hr (02/03/21 1729)  Pharmacy to Dose Zosyn,   sodium  "bicarbonate drip (greater than 75 mEq/bag), 100 mEq         PRN MEDICATIONS:   •  acetaminophen **OR** acetaminophen  •  aluminum-magnesium hydroxide-simethicone  •  dextrose  •  dextrose  •  glucagon (human recombinant)  •  insulin lispro **AND** insulin lispro  •  ipratropium-albuterol  •  nitroglycerin  •  ondansetron **OR** ondansetron  •  Pharmacy to Dose Zosyn  •  sodium chloride       OBJECTIVE     VITAL SIGNS:  BP (!) 30/12   Pulse 99   Temp 96.4 °F (35.8 °C) (Rectal)   Resp (!) 39   Ht 154.9 cm (61\")   Wt 87 kg (191 lb 12.8 oz)   LMP  (LMP Unknown)   SpO2 91%   BMI 36.24 kg/m²     Wt Readings from Last 3 Encounters:   01/11/21 86.4 kg (190 lb 6.4 oz)   01/11/21 86.2 kg (190 lb)   12/08/20 85.7 kg (188 lb 14.4 oz)       INTAKE/OUTPUT:  No intake or output data in the 24 hours ending 02/03/21 1637    PHYSICAL EXAM:   Constitutional:  Well developed, well nourished, no acute distress, non-toxic appearance, chronically ill-appearing  Eyes: Pupils fixed, dilated, nonreactive to light, conjunctiva normal, unable to assess EOM, patient unresponsive  HENT:  Atraumatic, external ears normal, nose normal, oropharynx moist, no pharyngeal exudates. Neck-normal range of motion, no tenderness, supple, trachea midline  Respiratory: Coarse breath sounds throughout, diminished bibasilar breath sounds, scattered wheezes and rhonchi noted, bibasilar Rales, tachypneic but with non-labored respirations without accessory muscle use  Cardiovascular: Elevated rate, slightly irregular rhythm,+PJ, no gallops, no rubs   GI: Obese, soft, nondistended, normal bowel sounds, nontender, no organomegaly, no mass, no rebound, no guarding   :  No costovertebral angle tenderness, Schneider catheter in place  Musculoskeletal: Bilateral lower extremity edema 2+, no tenderness, no deformities  Integument:  Well hydrated, no rash   Lymphatic:  No lymphadenopathy noted   Neurologic: Intubated, unresponsive, will not follow commands, does " not withdraw to pain, GCS 3  Psychiatric: Intubated, unresponsive      HISTORY     HISTORY:  Past Medical History:   Diagnosis Date   • Anxiety    • CAD (coronary artery disease)    • CHF (congestive heart failure) (CMS/[facility])    • Diabetes mellitus (CMS/[facility])    • GERD (gastroesophageal reflux disease)    • HLD (hyperlipidemia)    • Hypertension    • Muscle ache     Muscle ache/cramps   • Neuroendocrine carcinoma (CMS/[facility])    • Neuropathy    • Spinal stenosis     Chronic back pain     Past Surgical History:   Procedure Laterality Date   • CARDIAC CATHETERIZATION     • CARDIAC CATHETERIZATION N/A 11/12/2020    Procedure: Left Heart Cath;  Surgeon: Brynn Gutierrez MD;  Location: [facility] CATH INVASIVE LOCATION;  Service: Cardiovascular;  Laterality: N/A;   • CARDIAC CATHETERIZATION N/A 11/12/2020    Procedure: Coronary angiography;  Surgeon: Brynn Gutierrez MD;  Location: [facility] CATH INVASIVE LOCATION;  Service: Cardiovascular;  Laterality: N/A;   • CARDIAC CATHETERIZATION N/A 11/12/2020    Procedure: Left ventriculography;  Surgeon: Brynn Gutierrez MD;  Location: [facility] CATH INVASIVE LOCATION;  Service: Cardiovascular;  Laterality: N/A;   • CHOLECYSTECTOMY     • CORONARY STENT PLACEMENT       Family History   Problem Relation Age of Onset   • Lung cancer Mother    • Lung cancer Sister      Social History     Socioeconomic History   • Marital status: Single     Spouse name: Not on file   • Number of children: Not on file   • Years of education: Not on file   • Highest education level: Not on file   Tobacco Use   • Smoking status: Never Smoker   • Smokeless tobacco: Never Used   Substance and Sexual Activity   • Alcohol use: Never     Frequency: Never   • Drug use: Never        HOME MEDICATIONS:   Prior to Admission medications    Medication Sig Start Date End Date Taking? Authorizing Provider   amitriptyline (ELAVIL) 25 MG tablet Take 25 mg by mouth every night at bedtime. 2/1/20   Provider,  MD Goran   baclofen (LIORESAL) 20 MG tablet Take 40 mg by mouth Daily. 1/29/20   Goran Powell MD   budesonide-formoterol (SYMBICORT) 80-4.5 MCG/ACT inhaler Inhale 2 puffs 2 (Two) Times a Day. 10/18/20   Mason Farooq MD   clopidogrel (PLAVIX) 75 MG tablet Take 75 mg by mouth Daily. 12/30/19   Goran Powell MD   gabapentin (NEURONTIN) 300 MG capsule Take 1 capsule by mouth 2 (two) times a day. 1/12/21   Mason Farooq MD   glipizide (GLUCOTROL) 5 MG tablet Take 5 mg by mouth Daily. 1/29/20   Goran Powell MD   isosorbide mononitrate (IMDUR) 30 MG 24 hr tablet Take 30 mg by mouth Daily. 1/4/21   Goran Powell MD   JANUMET XR  MG tablet Take 1 tablet by mouth Daily. 1/29/20   Goran Powell MD   lisinopril-hydrochlorothiazide (PRINZIDE,ZESTORETIC) 10-12.5 MG per tablet Take 1 tablet by mouth Daily. 3/16/20   Goran Powell MD   meloxicam (MOBIC) 7.5 MG tablet Take 7.5 mg by mouth Daily. 2/1/20   Goran Powell MD   oxyCODONE-acetaminophen (PERCOCET)  MG per tablet Take 1 tablet by mouth Every 4 (Four) Hours As Needed. 2/28/20   Goran Powell MD   potassium chloride (K-DUR,KLOR-CON) 20 MEQ CR tablet Take 20 mEq by mouth Daily. 9/16/20   Goran Powell MD   rosuvastatin (CRESTOR) 10 MG tablet Take 10 mg by mouth Daily. 3/16/20   Goran Powell MD   sucralfate (CARAFATE) 1 g tablet Take 1 g by mouth 4 (Four) Times a Day. 12/30/19   Goran Powell MD   TRAZODONE HCL PO Take 100 mg by mouth Every Night.    Goran Powell MD   VENTOLIN  (90 Base) MCG/ACT inhaler Inhale 2 puffs Every 4 (Four) Hours As Needed. 4/10/20   Goran Powell MD       ALLERGIES:  Ambien [zolpidem]      RESULTS     LABS:  Lab Results (last 24 hours)     Procedure Component Value Units Date/Time    Blood Gas, Arterial - [[id number]]  (Abnormal) Collected: 02/03/21 1706    Specimen: Arterial Blood Updated: 02/03/21 1728     Site Right  Radial     Jesus's Test Positive     pH, Arterial 7.231 pH units      pCO2, Arterial 45.5 mm Hg      pO2, Arterial 52.4 mm Hg      HCO3, Arterial 19.1 mmol/L      Base Excess, Arterial -8.4 mmol/L      Comment: Serial Number: [id number]:  [id number]        O2 Saturation, Arterial 80.0 %      CO2 Content 20.5 mmol/L      Barometric Pressure for Blood Gas --     Comment: N/A        Modality Adult Vent     FIO2 90 %      Ventilator Mode ;AC     Set Tidal Volume 500     PEEP 10     Hemodilution No     Respiratory Rate 24    MRSA Screen, PCR (Inpatient) - Swab, Nares [[id number]]  (Normal) Collected: 02/03/21 1725    Specimen: Swab from Nares Updated: 02/03/21 2032     MRSA PCR No MRSA Detected    POC Glucose Once [[id number]]  (Abnormal) Collected: 02/03/21 1742    Specimen: Blood Updated: 02/03/21 1809     Glucose 217 mg/dL      Comment: Serial Number: [id number]:  [id number]       Cortisol [[id number]] Collected: 02/03/21 1800    Specimen: Blood Updated: 02/03/21 1841     Cortisol 23.08 mcg/dL     Narrative:      Cortisol Reference Ranges:    Cortisol 6AM - 10AM Range: 6.02-18.40 mcg/dl  Cortisol 4PM - 8PM Range: 2.68-10.50 mcg/dl      Results may be falsely increased if patient taking Biotin.      TSH [[id number]]  (Abnormal) Collected: 02/03/21 1800    Specimen: Blood Updated: 02/03/21 1841     TSH 0.025 uIU/mL     Lactic Acid, Plasma [[id number]]  (Abnormal) Collected: 02/03/21 1800    Specimen: Blood Updated: 02/03/21 1836     Lactate 7.5 mmol/L     CBC & Differential [[id number]]  (Abnormal) Collected: 02/03/21 1800    Specimen: Blood Updated: 02/03/21 1847    Narrative:      The following orders were created for panel order CBC & Differential.  Procedure                               Abnormality         Status                     ---------                               -----------         ------                     Scan Slide[[id number]]                   Normal              Final result               CBC Auto  "Differential[[id number]]        Abnormal            Final result                 Please view results for these tests on the individual orders.    Procalcitonin [[id number]]  (Abnormal) Collected: 02/03/21 1800    Specimen: Blood Updated: 02/03/21 1841     Procalcitonin 14.49 ng/mL     Narrative:      As a Marker for Sepsis (Non-Neonates):   1. <0.5 ng/mL represents a low risk of severe sepsis and/or septic shock.  1. >2 ng/mL represents a high risk of severe sepsis and/or septic shock.    As a Marker for Lower Respiratory Tract Infections that require antibiotic therapy:  PCT on Admission     Antibiotic Therapy             6-12 Hrs later  > 0.5                Strongly Recommended            >0.25 - <0.5         Recommended  0.1 - 0.25           Discouraged                   Remeasure/reassess PCT  <0.1                 Strongly Discouraged          Remeasure/reassess PCT      As 28 day mortality risk marker: \"Change in Procalcitonin Result\" (> 80 % or <=80 %) if Day 0 (or Day 1) and Day 4 values are available. Refer to http://www.[employer]-pct-calculator.com/   Change in PCT <=80 %   A decrease of PCT levels below or equal to 80 % defines a positive change in PCT test result representing a higher risk for 28-day all-cause mortality of patients diagnosed with severe sepsis or septic shock.  Change in PCT > 80 %   A decrease of PCT levels of more than 80 % defines a negative change in PCT result representing a lower risk for 28-day all-cause mortality of patients diagnosed with severe sepsis or septic shock.                Results may be falsely decreased if patient taking Biotin.     C-reactive Protein [[id number]]  (Abnormal) Collected: 02/03/21 1800    Specimen: Blood Updated: 02/03/21 1838     C-Reactive Protein 18.66 mg/dL     Phosphorus [[id number]]  (Normal) Collected: 02/03/21 1800    Specimen: Blood Updated: 02/03/21 1838     Phosphorus 4.3 mg/dL     Protime-INR [[id number]]  (Abnormal) Collected: 02/03/21 1800    " Specimen: Blood Updated: 02/03/21 1835     Protime 14.0 Seconds      INR 1.29    Comprehensive Metabolic Panel [[id number]]  (Abnormal) Collected: 02/03/21 1800    Specimen: Blood Updated: 02/03/21 1852     Glucose 251 mg/dL      BUN 31 mg/dL      Creatinine 2.13 mg/dL      Sodium 143 mmol/L      Potassium 3.3 mmol/L      Comment: Slight hemolysis detected by analyzer. Results may be affected.        Chloride 107 mmol/L      CO2 16.0 mmol/L      Calcium 8.5 mg/dL      Total Protein 6.2 g/dL      Albumin 2.80 g/dL      ALT (SGPT) 1,169 U/L      AST (SGOT) 3,783 U/L      Alkaline Phosphatase 136 U/L      Total Bilirubin 1.1 mg/dL      eGFR Non African Amer 23 mL/min/1.73      Globulin 3.4 gm/dL      A/G Ratio 0.8 g/dL      BUN/Creatinine Ratio 14.6     Anion Gap 20.0 mmol/L     Narrative:      GFR Normal >60  Chronic Kidney Disease <60  Kidney Failure <15      Calcium, Ionized [[id number]]  (Abnormal) Collected: 02/03/21 1800    Specimen: Blood Updated: 02/03/21 1820     Ionized Calcium 1.15 mmol/L     Magnesium [[id number]]  (Normal) Collected: 02/03/21 1800    Specimen: Blood Updated: 02/03/21 1838     Magnesium 1.8 mg/dL     aPTT [[id number]]  (Abnormal) Collected: 02/03/21 1800    Specimen: Blood Updated: 02/03/21 1835     PTT 23.2 seconds     Blood Culture - Blood, Hand, Left [[id number]] Collected: 02/03/21 1800    Specimen: Blood from Hand, Left Updated: 02/03/21 1806    Blood Culture - Blood, Arm, Right [[id number]] Collected: 02/03/21 1800    Specimen: Blood from Arm, Right Updated: 02/03/21 1806    CBC Auto Differential [[id number]]  (Abnormal) Collected: 02/03/21 1800    Specimen: Blood Updated: 02/03/21 1847     WBC 13.70 10*3/mm3      RBC 5.21 10*6/mm3      Hemoglobin 14.3 g/dL      Hematocrit 44.2 %      MCV 84.9 fL      MCH 27.5 pg      MCHC 32.4 g/dL      RDW 16.8 %      RDW-SD 51.2 fl      MPV 8.6 fL      Platelets 113 10*3/mm3      Neutrophil % 93.5 %      Lymphocyte % 3.1 %      Monocyte % 3.3 %       Eosinophil % 0.0 %      Basophil % 0.1 %      Neutrophils, Absolute 12.80 10*3/mm3      Lymphocytes, Absolute 0.40 10*3/mm3      Monocytes, Absolute 0.50 10*3/mm3      Eosinophils, Absolute 0.00 10*3/mm3      Basophils, Absolute 0.00 10*3/mm3      nRBC 0.6 /100 WBC     Scan Slide [[id number]]  (Normal) Collected: 02/03/21 1800    Specimen: Blood Updated: 02/03/21 1847     RBC Morphology Normal     WBC Morphology Normal     Platelet Morphology Normal    Narrative:      Slide Reviewed        Lactic Acid, Reflex Timer (This will reflex a repeat order 3-3:15 hours after ordered.) [[id number]] Collected: 02/03/21 1800    Specimen: Blood Updated: 02/03/21 2146     Hold Tube Hold for add-ons.     Comment: Auto resulted.       Troponin [[id number]]  (Abnormal) Collected: 02/03/21 1800    Specimen: Blood Updated: 02/03/21 2217     Troponin T 0.457 ng/mL     Narrative:      Troponin T Reference Range:  <= 0.03 ng/mL-   Negative for AMI  >0.03 ng/mL-     Abnormal for myocardial necrosis.  Clinicians would have to utilize clinical acumen, EKG, Troponin and serial changes to determine if it is an Acute Myocardial Infarction or myocardial injury due to an underlying chronic condition.       Results may be falsely decreased if patient taking Biotin.      Lactic Acid, Reflex [[id number]]  (Abnormal) Collected: 02/03/21 2211    Specimen: Blood Updated: 02/03/21 2253     Lactate 6.8 mmol/L     Blood Gas, Arterial - [[id number]]  (Abnormal) Collected: 02/03/21 2228    Specimen: Arterial Blood Updated: 02/03/21 2230     Site Right Brachial     Jesus's Test Positive     pH, Arterial 7.285 pH units      pCO2, Arterial 35.8 mm Hg      pO2, Arterial 67.3 mm Hg      HCO3, Arterial 17.0 mmol/L      Base Excess, Arterial -8.8 mmol/L      Comment: Serial Number: [id number]  [id number]        O2 Saturation, Arterial 90.9 %      CO2 Content 18.1 mmol/L      Barometric Pressure for Blood Gas --     Comment: N/A        Modality Adult Vent     FIO2 90 %       Ventilator Mode ;AC     Set Tidal Volume 520     PEEP 12     Hemodilution No     Respiratory Rate 28            MICRO:  Microbiology Results (last 10 days)     Procedure Component Value - Date/Time    MRSA Screen, PCR (Inpatient) - Swab, Nares [[id number]]  (Normal) Collected: 02/03/21 1725    Lab Status: Final result Specimen: Swab from Nares Updated: 02/03/21 2032     MRSA PCR No MRSA Detected            RADIOLOGY STUDIES:  Imaging Results (Last 72 Hours)     Procedure Component Value Units Date/Time    XR Abdomen KUB [[id number]] Collected: 02/03/21 1829     Updated: 02/03/21 1837    Narrative:      DATE OF EXAM:  2/3/2021 6:18 PM     PROCEDURE:  XR ABDOMEN KUB-     INDICATIONS:  ng placement     COMPARISON:  No Comparisons Available     TECHNIQUE:   Single radiographic view of the abdomen was obtained.     FINDINGS:  There is a nasogastric tube in the mildly dilated stomach. There are  scattered pockets of large and small bowel gas in a nonspecific but  nonobstructive pattern.       Impression:      Nasogastric tube is in the stomach.     Electronically Signed By-Scooter Nation MD On:2/3/2021 6:30 PM  This report was finalized on [id number] by  Scooter Nation MD.    XR Chest 1 View [[id number]] Collected: 02/03/21 1721     Updated: 02/03/21 1724    Narrative:      Examination: XR CHEST 1 VW-     Date of Exam: 2/3/2021 4:35 PM     Indication: Confirm ET Tube Placement.     Comparison: 11/10/2020     Technique: 1 view of the chest      Findings:  There is an endotracheal tube in the midthoracic trachea. The heart size  and pulmonary vascular markings are normal. There are diffuse bilateral  confluent alveolar airspace opacities consistent with multifocal  pneumonia. There is more dense consolidation in the left lung base.       Impression:      Diffuse bilateral alveolar airspace disease consistent with multifocal  pneumonia. ET tube appears properly positioned.     Electronically Signed By-Scooter Nation MD  On:2/3/2021 5:22 PM  This report was finalized on [id number] by  Scooter Nation MD.                   ECHOCARDIOGRAM:          I reviewed the patient's new clinical results.    I discussed the patient's findings and my recommendations with nursing staff.  I have discussed plan with on-call attending physician, who agrees with this plan of care.    Appropriate PPE worn during assessment of patient per established guidelines.      Electronically signed by DWIGHT Baez, 02/03/21, 4:37 PM EST.       I personally have examined  and interviewed the patient. I have reviewed the history, data, problems, assessment and plan with our NP.  Critical care time in direct medical management (   ) minutes   Pamela Cope MD, D,ABSM  2/3/2021

## 2021-02-04 NOTE — NURSING NOTE
2330 Attempted to call pts son regarding possible need for line (oldest child), no answer. Left voicemail.     [id number] Spoke with pts son, Speedy Heck, updated of patients current status and verified plan of care. Darío Heck verbalized that he wants patient's code status to be DNR. Intensivist notified.

## 2021-02-04 NOTE — CONSULTS
RENAL/KCC:    Referring Provider: Dr. Cope  Reason for Consultation: AMBER    Subjective     Chief complaint: S/P arrest    History of present illness:  Patient is a 72 yo WF with h/o CAD, CHF, DM presents from [facility] after cardiac arrest.  She is seen in the ICU on the [facility].  Her baseline Cr runs around 0.7.  It was 2.1 yesterday and has risen to 2.8.  She has a hein in place with minimal, dark urine.  CK elevated to 5400.  Bicarb was 16 on admission.  ROS is unobtainable.  Discussed with nurse.    History  Past Medical History:   Diagnosis Date   • Anxiety    • Arteriosclerosis of coronary artery 2/3/2021   • Back pain, chronic 2/3/2021   • CAD (coronary artery disease)    • CHF (congestive heart failure) (CMS/HCC)    • Chronic osteoarthritis 2/3/2021   • Congestive heart failure (CMS/HCC) 2/3/2021   • Depressive disorder 2/3/2021   • Diabetes mellitus (CMS/HCC)    • Former smoker, stopped smoking in distant past    • GERD (gastroesophageal reflux disease)    • HLD (hyperlipidemia)    • Hypertension    • Malignant neoplasm of lung (CMS/HCC) 2/3/2021    pt unaware of this dx   • Muscle ache     Muscle ache/cramps   • Neuroendocrine carcinoma (CMS/HCC)    • 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   • Neuropathy    • PUD (peptic ulcer disease) 2/3/2021   • Spinal stenosis     Chronic back pain   • Stented coronary artery 6/4/2016    nancy to [facility]   • Tobacco abuse 2/3/2021    2-26-18 Pt states quit smoking 50 years ago per MAS/dt   ,   Past Surgical History:   Procedure Laterality Date   • CARDIAC CATHETERIZATION     • CARDIAC CATHETERIZATION N/A 11/12/2020    Procedure: Left Heart Cath;  Surgeon: Brynn Gutierrez MD;  Location: [facility] CATH INVASIVE LOCATION;  Service: Cardiovascular;  Laterality: N/A;   • CARDIAC CATHETERIZATION N/A 11/12/2020    Procedure: Coronary angiography;  Surgeon: Brynn Gutierrez MD;  Location: [facility] CATH INVASIVE LOCATION;  Service:  Cardiovascular;  Laterality: N/A;   • CARDIAC CATHETERIZATION N/A 11/12/2020    Procedure: Left ventriculography;  Surgeon: Brynn Gutierrez MD;  Location: [facility] CATH INVASIVE LOCATION;  Service: Cardiovascular;  Laterality: N/A;   • CHOLECYSTECTOMY     • CORONARY STENT PLACEMENT     ,   Family History   Problem Relation Age of Onset   • Lung cancer Mother    • Lung cancer Sister    ,   Social History     Tobacco Use   • Smoking status: Former Smoker   • Smokeless tobacco: Never Used   Substance Use Topics   • Alcohol use: Never     Frequency: Never   • Drug use: Never     E-cigarette/Vaping     E-cigarette/Vaping Substances     E-cigarette/Vaping Devices       ,   Medications Prior to Admission   Medication Sig Dispense Refill Last Dose   • amitriptyline (ELAVIL) 25 MG tablet Take 25 mg by mouth every night at bedtime.      • baclofen (LIORESAL) 20 MG tablet Take 40 mg by mouth Daily.      • budesonide-formoterol (SYMBICORT) 80-4.5 MCG/ACT inhaler Inhale 2 puffs 2 (Two) Times a Day. 6.9 g 12    • clopidogrel (PLAVIX) 75 MG tablet Take 75 mg by mouth Daily.      • gabapentin (NEURONTIN) 300 MG capsule Take 1 capsule by mouth 2 (two) times a day. 60 capsule 2    • glipizide (GLUCOTROL) 5 MG tablet Take 5 mg by mouth Daily.      • isosorbide mononitrate (IMDUR) 30 MG 24 hr tablet Take 30 mg by mouth Daily.      • JANUMET XR  MG tablet Take 1 tablet by mouth Daily.      • lisinopril-hydrochlorothiazide (PRINZIDE,ZESTORETIC) 10-12.5 MG per tablet Take 1 tablet by mouth Daily.      • meloxicam (MOBIC) 7.5 MG tablet Take 7.5 mg by mouth Daily.      • oxyCODONE-acetaminophen (PERCOCET)  MG per tablet Take 1 tablet by mouth Every 4 (Four) Hours As Needed.      • potassium chloride (K-DUR,KLOR-CON) 20 MEQ CR tablet Take 20 mEq by mouth Daily.      • rosuvastatin (CRESTOR) 10 MG tablet Take 10 mg by mouth Daily.      • sucralfate (CARAFATE) 1 g tablet Take 1 g by mouth 4 (Four) Times a Day.      •  TRAZODONE HCL PO Take 100 mg by mouth Every Night.      • VENTOLIN  (90 Base) MCG/ACT inhaler Inhale 2 puffs Every 4 (Four) Hours As Needed.      , Scheduled Meds:  Acetylcysteine, 600 mg, Per G Tube, BID  albuterol sulfate HFA, 6 puff, Inhalation, Q4H - RT  ascorbic acid, 500 mg, Oral, Daily  aspirin, 324 mg, Per G Tube, Daily  budesonide-formoterol, 2 puff, Inhalation, BID - RT  chlorhexidine, 15 mL, Mouth/Throat, Q12H  cholecalciferol, 3,000 Units, Oral, Daily  clopidogrel, 75 mg, Oral, Daily  dexamethasone, 6 mg, Intravenous, Q12H  furosemide, 80 mg, Intravenous, Once  guaiFENesin, 400 mg, Oral, Q6H  heparin (porcine), 5,000 Units, Subcutaneous, Q12H  insulin lispro, 0-9 Units, Subcutaneous, Q6H  pantoprazole, 40 mg, Intravenous, Q12H  piperacillin-tazobactam, 3.375 g, Intravenous, Q8H  sodium chloride, 10 mL, Intravenous, Q12H  zinc sulfate, 220 mg, Oral, Daily    , Continuous Infusions:  fentanyl 10 mcg/mL,  mcg/hr, Last Rate: 100 mcg/hr (02/04/21 0809)  Pharmacy to Dose Zosyn,   sodium bicarbonate drip (greater than 75 mEq/bag), 100 mEq, Last Rate: 100 mEq (02/04/21 0050)    , PRN Meds:  •  acetaminophen **OR** acetaminophen  •  aluminum-magnesium hydroxide-simethicone  •  dextrose  •  dextrose  •  glucagon (human recombinant)  •  insulin lispro **AND** insulin lispro  •  ipratropium-albuterol  •  midazolam  •  nitroglycerin  •  ondansetron **OR** ondansetron  •  Pharmacy to Dose Zosyn  •  sodium chloride and Allergies:  Ambien [zolpidem]    Review of Systems  Pertinent items are noted in HPI    Objective     Vital Signs  Temp:  [96.4 °F (35.8 °C)-97.6 °F (36.4 °C)] 97.6 °F (36.4 °C)  Heart Rate:  [] 109  Resp:  [28-39] 30  BP: ()/(12-97) 93/53  FiO2 (%):  [70 %-100 %] 80 %    No intake/output data recorded.  I/O last 3 completed shifts:  In: 60 [NG/GT:60]  Out: 20 [Urine:20]    Physical Exam:  GEN: Intubated, Sedated  ENT: +ETT, OP clear  NECK: Supple, no JVD  CHEST: CTAB, no  W/R/C  CV: RRR, no M/G/R  ABD: Soft, +BS  SKIN: Warm and Dry  NEURO: Sedated      Results Review:   I reviewed the patient's new clinical results.    WBC WBC   Date Value Ref Range Status   02/04/2021 11.30 (H) 3.40 - 10.80 10*3/mm3 Final   02/03/2021 13.70 (H) 3.40 - 10.80 10*3/mm3 Final      HGB Hemoglobin   Date Value Ref Range Status   02/04/2021 13.9 12.0 - 15.9 g/dL Final   02/03/2021 14.3 12.0 - 15.9 g/dL Final      HCT Hematocrit   Date Value Ref Range Status   02/04/2021 42.2 34.0 - 46.6 % Final   02/03/2021 44.2 34.0 - 46.6 % Final      Platlets No results found for: LABPLAT   MCV MCV   Date Value Ref Range Status   02/04/2021 82.8 79.0 - 97.0 fL Final   02/03/2021 84.9 79.0 - 97.0 fL Final          Sodium Sodium   Date Value Ref Range Status   02/04/2021 142 136 - 145 mmol/L Final   02/03/2021 143 136 - 145 mmol/L Final      Potassium Potassium   Date Value Ref Range Status   02/04/2021 3.8 3.5 - 5.2 mmol/L Final   02/03/2021 3.3 (L) 3.5 - 5.2 mmol/L Final     Comment:     Slight hemolysis detected by analyzer. Results may be affected.      Chloride Chloride   Date Value Ref Range Status   02/04/2021 106 98 - 107 mmol/L Final   02/03/2021 107 98 - 107 mmol/L Final      CO2 CO2   Date Value Ref Range Status   02/04/2021 19.0 (L) 22.0 - 29.0 mmol/L Final   02/03/2021 16.0 (L) 22.0 - 29.0 mmol/L Final      BUN BUN   Date Value Ref Range Status   02/04/2021 40 (H) 8 - 23 mg/dL Final   02/03/2021 31 (H) 8 - 23 mg/dL Final      Creatinine Creatinine   Date Value Ref Range Status   02/04/2021 2.80 (H) 0.57 - 1.00 mg/dL Final   02/03/2021 2.13 (H) 0.57 - 1.00 mg/dL Final      Calcium Calcium   Date Value Ref Range Status   02/04/2021 8.5 (L) 8.6 - 10.5 mg/dL Final   02/03/2021 8.5 (L) 8.6 - 10.5 mg/dL Final      PO4 No results found for: CAPO4   Albumin Albumin   Date Value Ref Range Status   02/04/2021 2.80 (L) 3.50 - 5.20 g/dL Final   02/03/2021 2.80 (L) 3.50 - 5.20 g/dL Final      Magnesium Magnesium   Date  Value Ref Range Status   02/04/2021 2.0 1.6 - 2.4 mg/dL Final   02/03/2021 1.8 1.6 - 2.4 mg/dL Final      Uric Acid No results found for: URICACID         Acetylcysteine, 600 mg, Per G Tube, BID  albuterol sulfate HFA, 6 puff, Inhalation, Q4H - RT  ascorbic acid, 500 mg, Oral, Daily  aspirin, 324 mg, Per G Tube, Daily  budesonide-formoterol, 2 puff, Inhalation, BID - RT  chlorhexidine, 15 mL, Mouth/Throat, Q12H  cholecalciferol, 3,000 Units, Oral, Daily  clopidogrel, 75 mg, Oral, Daily  dexamethasone, 6 mg, Intravenous, Q12H  furosemide, 80 mg, Intravenous, Once  guaiFENesin, 400 mg, Oral, Q6H  heparin (porcine), 5,000 Units, Subcutaneous, Q12H  insulin lispro, 0-9 Units, Subcutaneous, Q6H  pantoprazole, 40 mg, Intravenous, Q12H  piperacillin-tazobactam, 3.375 g, Intravenous, Q8H  sodium chloride, 10 mL, Intravenous, Q12H  zinc sulfate, 220 mg, Oral, Daily      fentanyl 10 mcg/mL,  mcg/hr, Last Rate: 100 mcg/hr (02/04/21 0809)  Pharmacy to Dose Zosyn,   sodium bicarbonate drip (greater than 75 mEq/bag), 100 mEq, Last Rate: 100 mEq (02/04/21 0050)        Assessment/Plan     AMBER/ATN - s/p arrest  Metabolic acidosis  S/P arrest  ?UTI    Cardiopulmonary arrest with successful resuscitation (CMS/[facility])    Obesity (BMI 30-39.9)    CAD (coronary artery disease)    Chronic congestive heart failure, with preserved ejection fraction    Malignant neoplasm of lung, stage IV    Shock liver    Coagulopathy, secondary to shock liver    Acute kidney injury (CMS/[facility])    Hypokalemia    Hypomagnesemia    Closed fracture of multiple ribs of both sides, secondary to CPR    Closed, minimally displaced, fracture of body of sternum, secondary to CPR    Acute encephalopathy, concern for anoxic injury    Low serum thyroid stimulating hormone (TSH)    Lactic acidosis    Elevated troponin, possible NSTEMI    COVID-19 virus detected    Type 2 diabetes mellitus, without long-term current use of insulin (CMS/[facility])    Septic shock (CMS/[facility])     Acute urinary tract infection    CAP (community acquired pneumonia)    Acute respiratory failure with hypoxia (CMS/HCC)    COPD with acute exacerbation (CMS/HCC)    COPD exacerbation (CMS/HCC)    Melena    PLAN: Patient with ATN s/p arrest.  She is oligoanuric.  Acidemia is improved.  K is OK.  Waste products rising but no absolute indication for acute HD as of today.  Continue to monitor serial BMP's.  Maintain stable hemodynamics.  Avoid nephrotoxins and dose all meds for GFR under 20.  Will follow closely.    >35 min CCT spent in eval/management of this critcally ill patient      Ori Pineda MD   [facility]  02/04/21  @NOW

## 2021-02-04 NOTE — CONSULTS
Infectious Diseases Consult Note    Referring Provider: Pamela Cope MD    Reason for Consultation: Severe sepsis    Patient Care Team:  Gloria Barron APRN as PCP - General (Nurse Practitioner)  Ivis Manrique MD as Referring Physician (Internal Medicine)  Niko Vazquez MD as Consulting Physician (Cardiology)    Chief complaint     Status post cardiac arrest    Subjective     History of present illness:      This is 71-year-old white female who was transferred to [facility] on February 3, 2021.  Patient apparently was diagnosed with COVID-19 on arrival to the emergency room.  She collapsed at home and ambulance were called and on her way way to [facility] she had a cardiac arrest and was resuscitated.  The patient was eventually transferred to [facility].  She is currently intubated on the ventilator.  She was found to have shock liver.  The patient has a history of lung cancer and neuroendocrine cancer.  The patient was diagnosed with acute kidney failure.    Review of Systems   Review of Systems   Unable to perform ROS: Intubated       Medications  Medications Prior to Admission   Medication Sig Dispense Refill Last Dose   • amitriptyline (ELAVIL) 25 MG tablet Take 25 mg by mouth every night at bedtime.      • baclofen (LIORESAL) 20 MG tablet Take 40 mg by mouth Daily.      • budesonide-formoterol (SYMBICORT) 80-4.5 MCG/ACT inhaler Inhale 2 puffs 2 (Two) Times a Day. 6.9 g 12    • clopidogrel (PLAVIX) 75 MG tablet Take 75 mg by mouth Daily.      • gabapentin (NEURONTIN) 300 MG capsule Take 1 capsule by mouth 2 (two) times a day. 60 capsule 2    • glipizide (GLUCOTROL) 5 MG tablet Take 5 mg by mouth Daily.      • isosorbide mononitrate (IMDUR) 30 MG 24 hr tablet Take 30 mg by mouth Daily.      • JANUMET XR  MG tablet Take 1 tablet by mouth Daily.      • lisinopril-hydrochlorothiazide (PRINZIDE,ZESTORETIC) 10-12.5 MG per tablet Take 1 tablet by mouth Daily.       • meloxicam (MOBIC) 7.5 MG tablet Take 7.5 mg by mouth Daily.      • oxyCODONE-acetaminophen (PERCOCET)  MG per tablet Take 1 tablet by mouth Every 4 (Four) Hours As Needed.      • potassium chloride (K-DUR,KLOR-CON) 20 MEQ CR tablet Take 20 mEq by mouth Daily.      • rosuvastatin (CRESTOR) 10 MG tablet Take 10 mg by mouth Daily.      • sucralfate (CARAFATE) 1 g tablet Take 1 g by mouth 4 (Four) Times a Day.      • TRAZODONE HCL PO Take 100 mg by mouth Every Night.      • VENTOLIN  (90 Base) MCG/ACT inhaler Inhale 2 puffs Every 4 (Four) Hours As Needed.          History  Past Medical History:   Diagnosis Date   • Anxiety    • Arteriosclerosis of coronary artery 2/3/2021   • Back pain, chronic 2/3/2021   • CAD (coronary artery disease)    • CHF (congestive heart failure) (CMS/HCC)    • Chronic osteoarthritis 2/3/2021   • Congestive heart failure (CMS/HCC) 2/3/2021   • Depressive disorder 2/3/2021   • Diabetes mellitus (CMS/HCC)    • Former smoker, stopped smoking in distant past    • GERD (gastroesophageal reflux disease)    • HLD (hyperlipidemia)    • Hypertension    • Malignant neoplasm of lung (CMS/HCC) 2/3/2021    pt unaware of this dx   • Muscle ache     Muscle ache/cramps   • Neuroendocrine carcinoma (CMS/HCC)    • 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   • Neuropathy    • PUD (peptic ulcer disease) 2/3/2021   • Spinal stenosis     Chronic back pain   • Stented coronary artery 6/4/2016    nancy to [facility]   • Tobacco abuse 2/3/2021    2-26-18 Pt states quit smoking 50 years ago per MAS/dt     Past Surgical History:   Procedure Laterality Date   • CARDIAC CATHETERIZATION     • CARDIAC CATHETERIZATION N/A 11/12/2020    Procedure: Left Heart Cath;  Surgeon: Brynn Gutierrez MD;  Location: [facility] INVASIVE LOCATION;  Service: Cardiovascular;  Laterality: N/A;   • CARDIAC CATHETERIZATION N/A 11/12/2020    Procedure: Coronary angiography;  Surgeon: Matt  Brynn MARIN MD;  Location: [facility] CATH INVASIVE LOCATION;  Service: Cardiovascular;  Laterality: N/A;   • CARDIAC CATHETERIZATION N/A 11/12/2020    Procedure: Left ventriculography;  Surgeon: Brynn Gutierrez MD;  Location: [facility] CATH INVASIVE LOCATION;  Service: Cardiovascular;  Laterality: N/A;   • CHOLECYSTECTOMY     • CORONARY STENT PLACEMENT         Family History  Family History   Problem Relation Age of Onset   • Lung cancer Mother    • Lung cancer Sister        Social History   reports that she has quit smoking. She has never used smokeless tobacco. She reports that she does not drink alcohol or use drugs.    Allergies  Ambien [zolpidem]    Objective     Vital Signs   Vital Signs (last 24 hours)       02/03 0700  -  02/04 0659 02/04 0700  -  02/04 1510   Most Recent    Temp (°F) 96.4 -  97.3    97.6 -  98.9     98.9 (37.2)    Heart Rate 91 -  114    100 -  115     102    Resp 28 -  39      30     30    BP 30/12 -  163/90    93/53 -  142/89     142/89    SpO2 (%) 91 -  99    93 -  97     94          Physical Exam:  Physical Exam  Constitutional:       Comments: Intubated and sedated   HENT:      Mouth/Throat:      Mouth: Mucous membranes are moist.   Eyes:      Pupils: Pupils are equal, round, and reactive to light.   Neck:      Musculoskeletal: Neck supple.   Cardiovascular:      Rate and Rhythm: Normal rate and regular rhythm.   Pulmonary:      Breath sounds: Rales present.   Abdominal:      General: Abdomen is flat. Bowel sounds are normal.      Palpations: Abdomen is soft.   Musculoskeletal:      Right lower leg: Edema present.      Left lower leg: Edema present.         Microbiology  Microbiology Results (last 10 days)     Procedure Component Value - Date/Time    Respiratory Culture - Sputum, ET Suction [[id number]] Collected: 02/04/21 0840    Lab Status: Preliminary result Specimen: Sputum from ET Suction Updated: 02/04/21 1040     Gram Stain Few (2+) WBCs per low power field      Rare (1+)  Yeast    Legionella Antigen, Urine - Urine, Urine, Clean Catch [[id number]]  (Normal) Collected: 02/04/21 0051    Lab Status: Final result Specimen: Urine, Clean Catch Updated: 02/04/21 0138     LEGIONELLA ANTIGEN, URINE Negative    S. Pneumo Ag Urine or CSF - Urine, Urine, Clean Catch [[id number]]  (Normal) Collected: 02/04/21 0051    Lab Status: Final result Specimen: Urine, Clean Catch Updated: 02/04/21 0138     Strep Pneumo Ag Negative    MRSA Screen, PCR (Inpatient) - Swab, Nares [[id number]]  (Normal) Collected: 02/03/21 1725    Lab Status: Final result Specimen: Swab from Nares Updated: 02/03/21 2032     MRSA PCR No MRSA Detected          Laboratory  Results from last 7 days   Lab Units 02/04/21  0106   WBC 10*3/mm3 11.30*   HEMOGLOBIN g/dL 13.9   HEMATOCRIT % 42.2   PLATELETS 10*3/mm3 131*     Results from last 7 days   Lab Units 02/04/21  1351   SODIUM mmol/L 138   POTASSIUM mmol/L 4.0   CHLORIDE mmol/L 104   CO2 mmol/L 15.0*   BUN mg/dL 51*   CREATININE mg/dL 3.43*   GLUCOSE mg/dL 314*   CALCIUM mg/dL 8.4*     Results from last 7 days   Lab Units 02/04/21  1351   SODIUM mmol/L 138   POTASSIUM mmol/L 4.0   CHLORIDE mmol/L 104   CO2 mmol/L 15.0*   BUN mg/dL 51*   CREATININE mg/dL 3.43*   GLUCOSE mg/dL 314*   CALCIUM mg/dL 8.4*     Results from last 7 days   Lab Units 02/04/21  0228   CK TOTAL U/L 5,494*               Radiology  Imaging Results (Last 72 Hours)     Procedure Component Value Units Date/Time    XR Chest 1 View [[id number]] Collected: 02/04/21 0742     Updated: 02/04/21 0745    Narrative:         DATE OF EXAM:   2/4/2021 5:40 AM     PROCEDURE:   XR CHEST 1 VW-     INDICATIONS:   Intubated Patient     COMPARISON:  2/3/2021     TECHNIQUE:   Portable chest radiograph.     FINDINGS:    Placement of an esophagogastric tube courses below the diaphragm.  Endotracheal tube tip 4 cm above the adonay. Stable cardiomegaly.  Multifocal airspace disease throughout the lungs, not significantly  changed. No  pneumothorax. Suspected small left pleural effusion.       Impression:      1. Placement of an esophagogastric tube is below the diaphragm.  2. Stable endotracheal tube.  3. No change in multifocal airspace disease throughout the lungs with  small left pleural effusion.     Electronically Signed By-Demario Simmons MD On:2/4/2021 7:43 AM  This report was finalized on [id number] by  Demario Simmons MD.    XR Outside Films [[id number]] Resulted: 02/04/21 0626     Updated: 02/04/21 0626    Narrative:      This procedure was auto-finalized with no dictation required.    XR Outside Films [[id number]] Resulted: 02/04/21 0559     Updated: 02/04/21 0559    Narrative:      This procedure was auto-finalized with no dictation required.    CT Outside Films [[id number]] Resulted: 02/04/21 0558     Updated: 02/04/21 0558    Narrative:      This procedure was auto-finalized with no dictation required.    CT Outside Films [[id number]] Resulted: 02/04/21 0558     Updated: 02/04/21 0558    Narrative:      This procedure was auto-finalized with no dictation required.    CT Outside Films [[id number]] Resulted: 02/04/21 0557     Updated: 02/04/21 0557    Narrative:      This procedure was auto-finalized with no dictation required.    CT Outside Films [[id number]] Resulted: 02/04/21 0557     Updated: 02/04/21 0557    Narrative:      This procedure was auto-finalized with no dictation required.    XR Abdomen KUB [[id number]] Collected: 02/03/21 1829     Updated: 02/03/21 1837    Narrative:      DATE OF EXAM:  2/3/2021 6:18 PM     PROCEDURE:  XR ABDOMEN KUB-     INDICATIONS:  ng placement     COMPARISON:  No Comparisons Available     TECHNIQUE:   Single radiographic view of the abdomen was obtained.     FINDINGS:  There is a nasogastric tube in the mildly dilated stomach. There are  scattered pockets of large and small bowel gas in a nonspecific but  nonobstructive pattern.       Impression:      Nasogastric tube is in the stomach.      Electronically Signed By-Scooter Nation MD On:2/3/2021 6:30 PM  This report was finalized on [id number] by  Scooter Nation MD.    XR Chest 1 View [[id number]] Collected: 02/03/21 1721     Updated: 02/03/21 1724    Narrative:      Examination: XR CHEST 1 VW-     Date of Exam: 2/3/2021 4:35 PM     Indication: Confirm ET Tube Placement.     Comparison: 11/10/2020     Technique: 1 view of the chest      Findings:  There is an endotracheal tube in the midthoracic trachea. The heart size  and pulmonary vascular markings are normal. There are diffuse bilateral  confluent alveolar airspace opacities consistent with multifocal  pneumonia. There is more dense consolidation in the left lung base.       Impression:      Diffuse bilateral alveolar airspace disease consistent with multifocal  pneumonia. ET tube appears properly positioned.     Electronically Signed By-Scooter Nation MD On:2/3/2021 5:22 PM  This report was finalized on [id number] by  Scooter Nation MD.          Cardiology      Results Review:  I have reviewed all clinical data, test, lab, and imaging results.       Schedule Meds  Acetylcysteine, 600 mg, Per G Tube, BID  albuterol sulfate HFA, 6 puff, Inhalation, Q4H - RT  ascorbic acid, 500 mg, Oral, Daily  aspirin, 324 mg, Per G Tube, Daily  budesonide-formoterol, 2 puff, Inhalation, BID - RT  chlorhexidine, 15 mL, Mouth/Throat, Q12H  cholecalciferol, 3,000 Units, Oral, Daily  clopidogrel, 75 mg, Oral, Daily  dexamethasone, 6 mg, Intravenous, Q12H  guaiFENesin, 400 mg, Oral, Q6H  heparin (porcine), 5,000 Units, Subcutaneous, Q12H  insulin lispro, 0-9 Units, Subcutaneous, Q6H  pantoprazole, 40 mg, Intravenous, Q12H  piperacillin-tazobactam, 3.375 g, Intravenous, Q8H  sodium chloride, 10 mL, Intravenous, Q12H  zinc sulfate, 220 mg, Oral, Daily        Infusion Meds  fentanyl 10 mcg/mL,  mcg/hr, Last Rate: 100 mcg/hr (02/04/21 0809)  Pharmacy to Dose Zosyn,   sodium bicarbonate drip (greater than 75 mEq/bag),  100 mEq, Last Rate: 100 mEq (02/04/21 0050)        PRN Meds  •  acetaminophen **OR** acetaminophen  •  aluminum-magnesium hydroxide-simethicone  •  dextrose  •  dextrose  •  glucagon (human recombinant)  •  insulin lispro **AND** insulin lispro  •  ipratropium-albuterol  •  midazolam  •  nitroglycerin  •  ondansetron **OR** ondansetron  •  Pharmacy to Dose Zosyn  •  sodium chloride      Assessment/Plan       Assessment    S/p cardiac arrest prior to hospital admission.  Patient was resuscitated successfully    Respiratory failure probably secondary to aspiration pneumonia and COVID-19 infection    COVID-19 infection diagnosed on February 3, 2021    Shock liver    Acute kidney failure    Patient has history of lung cancer and neuroendocrine malignancy    Plan    The patient is not a candidate for remdesivir secondary to shock liver and acute kidney failure  Prognosis is very poor  Continue supportive care, patient is currently DNR  Continue IV Zosyn but decrease dose to 3.375 g every 12 hours  Continue enhanced isolation for COVID-19 infection  Labs in a.m. including : CBC with differential, CMP,  d-dimer, ferritin and CRP    Appropriate PPE was placed on before entering the room    Harpal Luke MD  02/04/21  15:10 EST      Note is dictated utilizing voice recognition software/Dragon

## 2021-02-04 NOTE — CONSULTS
Nutrition Services    Patient Name: Christiane Heck  YOB: 1950  MRN: [id number]  Admission date: 2/3/2021    Comments:  Initiate tube feeding of Impact Peptide 1.5 @30mL/hour with 10mL every 2 hours water flush.  RD to follow up tomorrow for possible increase to goal.      PPE Documentation        PPE Worn By Provider N/A, observed from room due to COVID-19+ pt   PPE Worn By Patient  N/A     CLINICAL NUTRITION ASSESSMENT       Reason for Assessment 2/4/21: TF consult, BMI 30-39.9 consult     H&P:  71 y.o. 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  [facility] following [facility], as patient presents to this facility intubated, sedated with no family currently present.  Additional past medical history includes stage IV lung cancer.     Past Medical History:   Diagnosis Date   • Anxiety    • Arteriosclerosis of coronary artery 2/3/2021   • Back pain, chronic 2/3/2021   • CAD (coronary artery disease)    • CHF (congestive heart failure) (CMS/HCC)    • Chronic osteoarthritis 2/3/2021   • Congestive heart failure (CMS/HCC) 2/3/2021   • Depressive disorder 2/3/2021   • Diabetes mellitus (CMS/HCC)    • Former smoker, stopped smoking in  distant past    • GERD (gastroesophageal reflux disease)    • HLD (hyperlipidemia)    • Hypertension    • Malignant neoplasm of lung (CMS/HCC) 2/3/2021    pt unaware of this dx   • Muscle ache     Muscle ache/cramps   • Neuroendocrine carcinoma (CMS/HCC)    • 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   • Neuropathy    • PUD (peptic ulcer disease) 2/3/2021   • Spinal stenosis     Chronic back pain   • Stented coronary artery 6/4/2016    nancy to [facility]   • Tobacco abuse 2/3/2021    2-26-18 Pt states quit smoking 50 years ago per MAS/dt       Past Surgical History:   Procedure Laterality Date   • CARDIAC CATHETERIZATION     • CARDIAC CATHETERIZATION N/A 11/12/2020    Procedure: Left Heart Cath;  Surgeon: Brynn Gutierrez MD;  Location:  [facility];  Service: Cardiovascular;  Laterality: N/A;   • CARDIAC CATHETERIZATION N/A 11/12/2020    Procedure: Coronary angiography;  Surgeon: Brynn Gutierrez MD;  Location:  [facility];  Service: Cardiovascular;  Laterality: N/A;   • CARDIAC CATHETERIZATION N/A 11/12/2020    Procedure: Left ventriculography;  Surgeon: Brynn Gutierrez MD;  Location:  [facility];  Service: Cardiovascular;  Laterality: N/A;   • CHOLECYSTECTOMY     • CORONARY STENT PLACEMENT          Current Problems:   Cardiopulmonary arrest with successful resuscitation  CAD, S/P Cardiac stent  Elevated troponin, possible NSTEMI  -Etiology of cardiac arrest unknown, but suspicious for hypoxia related  -CT PE protocol was obtained and negative  -Monitor serial troponins  -ASA daily, Plavix; unsure if patient is currently taking Plavix, but was recently on Plavix, will resume for now  -On cardiac monitoring  -Cardiology consulted  -TTM considered, but was awaiting CT head and CT PE protocol, which was to be completed prior to transfer, however, patient was unable to be transferred to this facility until outside the  window of current evidence-based recommendations, eliminating patient is a candidate for TTM     Septic shock  Acute urinary tract infection  CAP (community acquired pneumonia)  COVID-19 virus detected  Lactic acidosis  -2 L of IV fluids given at [facility]  -Continue IVFs as ordered  -initially on Samuel-Synephrine at [facility], currently normotensive  -monitor serial lactic acid levels until normalized  -blood cultures-pending  -UA-3+ bacteria at [facility], culture was reflexed, follow [facility] culture results  -COVID-19 positive at [facility], new diagnosis on 2/30/2021  -CRP 18.66, procalcitonin 14.49 on admission  -Rocephin given at [facility], will expand to Zosyn for better coverage of probable pneumonia  -Concern for possible aspiration given patient's prolonged resuscitation  -Sputum culture-pending  -Repeat UA at this facility-pending  -Urine antigens-pending  -Follow Covid 19 progression labs as ordered  -Due to elevated liver function tests, patient not a candidate for Ivermectin or Remdesivir; monitor LFTs to see if improvement will allow for med administration  -Continue Vitamin A, vitamin D, zinc, acetylcysteine, and vitamin C  -Avoiding colchicine due to elevated LFTs.  -Decadron, titrate based on patient's clinical status  -Infectious disease consulted     Acute respiratory failure with hypoxia  COPD with acute exacerbation  -Likely secondary to COVID-19 infection, complicated by hypoxia  -Initial presentation upon EMS arrival, oxygen saturation in the 50s  -Ventilator management, wean FiO2 and PEEP as tolerated  -Monitor ABGs and chest x-rays  -Likely multifactorial, related to cardiopulmonary arrest, COVID-19 infection, and pneumonia  -IV steroids as above  -Albuterol HFA, Symbicort scheduled and as needed  -CT PE protocol negative at [facility]  -Ventilator mismatch, abdominal breathing, started on fentanyl for sedation     Shock  liver  Coagulopathy, secondary to shock liver  -Etiology likely secondary to cardiopulmonary arrest  -On admission, alkaline phosphatase 136, ALT 1169, AST 3783  -Monitor and trend labs  -Consider GI consultation     Primary malignant neuroendocrine tumor  Secondary malignant neoplasm of lung, stage IV  -Does not appear to have had any further treatment since 4/1/2020, was being seen at [facility]  -Consider hematology/oncology consultation     Peptic ulcer disease  Melena, on admission  -Check Hemoccult stool  -Hemoglobin stable  -Protonix twice daily, for now  -Patient is high risk for VTE given COVID-19 diagnosis, will start patient on heparin subcu every 12 hours  -Monitor patient's hemoglobin, will discontinue antiplatelets and anticoagulation if evidence of bleeding, currently benefit is greater than risk     Acute kidney injury  -BUN 31, creatinine 2.13 on admission  -Monitor and trend labs  -Avoid hypotension, NSAIDs, and nephrotoxic medications  -Nephrology consulted     Hypokalemia  -Replacement ordered  -Monitor and trend labs     Hypomagnesemia  -Replacement ordered  -Monitor and trend labs     Closed fracture of multiple ribs of both sides, secondary to CPR  Closed, minimally displaced, fracture of body of sternum, secondary to CPR  -Findings on outlying facility CT  -Further recommendations as indicated, currently stable     Acute encephalopathy, concern for anoxic injury  -CT of the head was negative for intracranial abnormalities  -Patient continues unresponsive, and was not on sedation prior to arrival to this facility  -Likely very poor prognosis  -Consider neurology consultation     Low serum thyroid stimulating hormone (TSH)  -Will recheck thyroid studies in AM     Type 2 diabetes mellitus, without long-term current use of insulin  -strict glycemic control recommended  -Accuchecks AC/HS or Q6H with sliding scale insulin, based on diet  -Hold oral diabetic  "medications until out of ICU.     Palliative care consultation in AM  NG Placed, dietitian consult for tube feed recommendations.       Nutrition/Diet History         Narrative     2/4: Pt discussed in critical care rounds, currently intubated and sedated with Fentayl. Possible HD to start today pending labs. Family has changed pt to DNR.    Pt not appropriate for obesity diet education per consult order.     Functional Status  Pt lived alone PTA     Food Allergies NKFA     Factors Affecting   Nutritional Intake Compromised airway, cancer     Anthropometrics        Current Height, Weight Height: 154.9 cm (61\")  Weight: 97 kg (213 lb 13.5 oz) (02/04/21 0500)        Admit Height, Weight -    Flowsheet Rows      First Filed Value   Admission Height  154.9 cm (61\") Documented at 02/03/2021 1840   Admission Weight  87 kg (191 lb 12.8 oz) Documented at 02/03/2021 1633             Ideal Body Weight (IBW) 105lb   % Ideal Body Weight 203%       Usual Body Weight UTD   % Usual Body Weight UTD   Wt Change Observation -current weight appears within usual weights, some outlier weights are noted   Weight Hx    Wt Readings from Last 30 Encounters:   02/04/21 0500 97 kg (213 lb 13.5 oz)   02/03/21 1840 87 kg (191 lb 12.8 oz)   02/03/21 1633 87 kg (191 lb 12.8 oz)   01/11/21 1020 86.4 kg (190 lb 6.4 oz)   01/11/21 1043 86.2 kg (190 lb)   12/08/20 1051 85.7 kg (188 lb 14.4 oz)   12/08/20 1012 85.7 kg (189 lb)   11/16/20 1455 87.5 kg (193 lb)   11/12/20 0836 92.7 kg (204 lb 6.2 oz)   10/19/20 1436 93 kg (205 lb)   10/14/20 1502 93 kg (205 lb)   10/12/20 0853 93 kg (205 lb)   08/03/20 1329 93.1 kg (205 lb 4.8 oz)   08/03/20 1524 93 kg (205 lb)   07/15/20 1340 96.2 kg (212 lb)   07/14/20 1131 94.8 kg (209 lb)   07/07/20 1059 95.8 kg (211 lb 3.2 oz)   06/30/20 1509 96.6 kg (213 lb)   06/30/20 1338 96.6 kg (213 lb)   06/18/20 1223 92.5 kg (204 lb)   06/08/20 1028 94.8 kg (208 lb 15.9 oz)   06/03/20 1130 94.8 kg (209 lb)   05/06/20 1051 " "95.7 kg (211 lb)   04/08/20 1102 95.3 kg (210 lb)   03/19/20 1033 95.3 kg (210 lb)        BMI kg/m2 Body mass index is 40.41 kg/m².       Labs/Medications         Pertinent Labs -Elev BUN/Creat, possible HD  -Elev Alk Phos/ALT/AST  -Elev Phos   Results from last 7 days   Lab Units 02/04/21  0228 02/03/21  1800   SODIUM mmol/L 142 143   POTASSIUM mmol/L 3.8 3.3*   CHLORIDE mmol/L 106 107   CO2 mmol/L 19.0* 16.0*   BUN mg/dL 40* 31*   CREATININE mg/dL 2.80* 2.13*   CALCIUM mg/dL 8.5* 8.5*   BILIRUBIN mg/dL 1.1 1.1   ALK PHOS U/L 144* 136*   ALT (SGPT) U/L 976* 1,169*   AST (SGOT) U/L 2,875* 3,783*   GLUCOSE mg/dL 212* 251*     Results from last 7 days   Lab Units 02/04/21 0228 02/04/21  0106 02/03/21  1800   MAGNESIUM mg/dL 2.0  --  1.8   PHOSPHORUS mg/dL 4.9*  --  4.3   HEMOGLOBIN g/dL  --  13.9 14.3   HEMATOCRIT %  --  42.2 44.2   TRIGLYCERIDES mg/dL 130  --   --      SARS-CoV-2 MANNY   Date Value Ref Range Status   11/10/2020 Not Detected Not Detected Final     No results found for: HGBA1C      Pertinent Medications -Vit C, Vit D3, Decadron, Humalog, Protonix, Zosyn, ZnSO4 (started 2/4), Fentanyl, NaBicarb in D5 1000mL     Physical Findings        Overall Physical   Appearance, MSA 2/4: Observed patient from outside room, appeared well-nourished   -  Edema  None documented     Gastrointestinal BM 2/4 (nurse reports 1 BM with some blood, otherwise no issues)   Tubes NGT   Oral/Mouth Cavity Baseline function unknown   Skin No breakdown documented     Estimated/Assessed Needs       Energy Requirements    Height for Calculation  Height: 154.9 cm (61\")   Weight for Calculation -97kg   Method for Estimation  -18kcal/kg   EST Needs (kcal/day) -1746kcal/day       Protein Requirements    Weight for Calculation -47kg, IBW   EST Protein Needs (g/kg) -2.2g/kg (pt just slightly over 40 BMI)   EST Daily Needs (g/day) -103g/day       Fluid Requirements     Estimated Needs (mL/day) -1mL/kcal       Fluid Deficit    Current Na Level " (mEq/L) -   Desired Na Level (mEq/L) -   Estimated Fluid Deficit (L)  -     Current Nutrition Orders & Evaluation of Received Nutrient/Fluid Intake       Oral Nutrition     Current PO Diet NPO Diet   Supplement -   PO Evaluation     % PO Intake -   -  Enteral Nutrition    Enteral Route -NGT   TF Modular -   TF Delivery Method -   Current Ordered TF  -   Current Ordered H2O flush  -    TF Observation  -   EN Evaluation     TF Changes -    TF Residual -    TF Tolerance -    Average EN Delivered -       Parenteral Nutrition     TPN Route -   Current Ordered TPN VOL  -   Dextrose (g/kcals)  -   Amino Acid (g/kcals) -   Lipids (mL/Concentration/FREQ)  -   MVI Frequency  -   Trace Element Frequency  -   TPN Observation  -    TPN Evaluation    Total # Days on TPN -   -  Clinical Course    Nutrition Course Details NPO since admit     Nutritional Risk Screening        NRS-2002 Score   -       Nutrition Diagnosis         Nutrition Dx Problem 1 -Inadequate oral intake related to clinical course, intubation, as evidenced by NPO with need for EN.      Nutrition Dx Problem 2 -       Intervention Goal         Intervention Goal(s) -EN initiated.     Nutrition Intervention        RD/Tech Action -Workup and order EN       Nutrition Prescription          Diet Prescription -NPO   Supplement Prescription -   -  Enteral Prescription -Initial EN:  Impact Peptide 1.5 @30mL/hour over 22 hours to provide 990kcal, 61g protein, 508mL free water.    -Goal EN:  Impact Peptide 1.5 @55mL/hour over 22 hours to provide 1815kcal (104%), 113g protein (110%), and 932mL free water    Pt with hx of heart failure, will initiate free water flush at a minimal amount initially, 10mL every 2 hours.       TPN Prescription -     Monitor/Evaluation        Monitor EN initiation/advancement, weight, labs, BM, skin, hemodynamics     Electronically signed by:  Estella Carrillo RD  02/04/21 13:25 EST

## 2021-02-04 NOTE — PROGRESS NOTES
Discharge Planning Assessment  [facility]     Patient Name: Christiane Heck  MRN: [id number]  Today's Date: 2/4/2021    Admit Date: 2/3/2021    Discharge Needs Assessment     Row Name 02/04/21 1202       Living Environment    Lives With  alone    Current Living Arrangements  home/apartment/condo    Able to Return to Prior Arrangements  yes       Resource/Environmental Concerns    Transportation Concerns  car, none       Transition Planning    Patient/Family Anticipates Transition to  other (see comments) may not survive    Transportation Anticipated  family or friend will provide       Discharge Needs Assessment    Readmission Within the Last 30 Days  no previous admission in last 30 days    Equipment Currently Used at Home  walker, rolling        Discharge Plan     Row Name 02/04/21 1204       Plan    Plan  D/C Plan : Pending clincal course, Pt is COVID-19 positive . Pt was a cardiac arrest and is on the vent . Pt is a DNR . Palliaitive consulted.    Plan Comments  Barrier to D/C : Pt is COVID-19 postive and is on the vent . Pt was a cardiac arrest and has stage 4 pancreatic cancer .        Continued Care and Services - Admitted Since 2/3/2021    Coordination has not been started for this encounter.         Demographic Summary     Row Name 02/04/21 1201       General Information    Admission Type  inpatient    Arrived From  emergency department    Required Notices Provided  Important Message from Medicare    Preferred Language  English     Used During This Interaction  no        Functional Status     Row Name 02/04/21 1202       Functional Status    Usual Activity Tolerance  moderate    Current Activity Tolerance  poor       Mental Status    General Appearance WDL  WDL       Mental Status Summary    Recent Changes in Mental Status/Cognitive Functioning  unable to assess on the vent                Audra Harrison RN

## 2021-02-04 NOTE — PROGRESS NOTES
PULMONARY/CRITICAL CARE PROGRESS NOTE         Name:  Christiane Heck  Age: 71 y.o.  Sex:  female  :   1950  MRN:  [id number]     ROOM:  [facility] ICU      ASSESSMENT & PLAN     F/U: Cardiopulmonary arrest    Principal Problem:    Cardiopulmonary arrest with successful resuscitation (CMS/HCC)  Active Problems:    Obesity (BMI 30-39.9)    CAD (coronary artery disease)    Chronic congestive heart failure, with preserved ejection fraction    Malignant neoplasm of lung, stage IV    Shock liver    Coagulopathy, secondary to shock liver    Acute kidney injury (CMS/HCC)    Hypokalemia    Hypomagnesemia    Closed fracture of multiple ribs of both sides, secondary to CPR    Closed, minimally displaced, fracture of body of sternum, secondary to CPR    Acute encephalopathy, concern for anoxic injury    Low serum thyroid stimulating hormone (TSH)    Lactic acidosis    Elevated troponin, possible NSTEMI    COVID-19 virus detected    Type 2 diabetes mellitus, without long-term current use of insulin (CMS/[facility])    Septic shock (CMS/[facility])    Acute urinary tract infection    CAP (community acquired pneumonia)    Acute respiratory failure with hypoxia (CMS/[facility])    COPD with acute exacerbation (CMS/[facility])    COPD exacerbation (CMS/[facility])    Melena       Multidisciplinary Rounds:  -Code Status changed to No CPR, Full Interventions overnight  -Palliative Care Consulted  -Cardiology Consulted.  -NGT, tube feed recommendations per Dietitian  -Nephrology consulted.      PLAN:  Cardiopulmonary arrest with successful resuscitation  CAD, S/P Cardiac stent  Elevated troponin, possible NSTEMI  -Etiology of cardiac arrest unknown, but suspicious for hypoxia related  -CT PE protocol was obtained and negative  -Monitor serial troponins  -ASA daily, Plavix; unsure if patient is currently taking Plavix, but was recently on Plavix, will resume for now  -On cardiac monitoring  -Cardiology consulted  -TTM considered, but was awaiting CT head and  CT PE protocol, which was to be completed prior to transfer, however, patient was unable to be transferred to this facility until outside the window of current evidence-based recommendations, eliminating patient is a candidate for TTM     Septic shock  Acute urinary tract infection  CAP (community acquired pneumonia)  COVID-19 virus detected  Lactic acidosis  -2 L of IV fluids given at [facility]  -Continue IVFs as ordered  -initially on Samuel-Synephrine at [facility], currently normotensive  -monitor serial lactic acid levels until normalized  -blood cultures-pending  -UA-3+ bacteria at [facility], culture was reflexed, follow [facility] culture results  -COVID-19 positive at [facility], new diagnosis on 2/30/2021  -CRP 18.66, procalcitonin 14.49 on admission  -Rocephin given at [facility], will expand to Zosyn for better coverage of probable pneumonia  -Concern for possible aspiration given patient's prolonged resuscitation  -Sputum culture-pending  -Repeat UA at this facility-pending  -Urine antigens-pending  -Follow Covid 19 progression labs as ordered  -Due to elevated liver function tests, patient not a candidate for Ivermectin or Remdesivir; monitor LFTs to see if improvement will allow for med administration  -Continue Vitamin A, vitamin D, zinc, acetylcysteine, and vitamin C  -Avoiding colchicine due to elevated LFTs.  -Decadron, titrate based on patient's clinical status  -Infectious disease consulted     Acute respiratory failure with hypoxia  COPD with acute exacerbation  -Likely secondary to COVID-19 infection, complicated by hypoxia  -Initial presentation upon EMS arrival, oxygen saturation in the 50s  -Ventilator management, wean FiO2 and PEEP as tolerated  -Monitor ABGs and chest x-rays  -Likely multifactorial, related to cardiopulmonary arrest, COVID-19 infection, and pneumonia  -IV steroids as above  -Albuterol HFA, Symbicort scheduled and as needed  -CT PE  protocol negative at outlying facility  -Ventilator mismatch, abdominal breathing, started on fentanyl for sedation     Shock liver  Coagulopathy, secondary to shock liver  -Etiology likely secondary to cardiopulmonary arrest  -On admission, alkaline phosphatase 136, ALT 1169, AST 3783  -Monitor and trend labs  -Consider GI consultation     Primary malignant neuroendocrine tumor  Secondary malignant neoplasm of lung, stage IV  -Does not appear to have had any further treatment since 4/1/2020, was being seen at [facility]  -Consider hematology/oncology consultation     Peptic ulcer disease  Melena, on admission  -Check Hemoccult stool  -Hemoglobin stable  -Protonix twice daily, for now  -Patient is high risk for VTE given COVID-19 diagnosis, will start patient on heparin subcu every 12 hours  -Monitor patient's hemoglobin, will discontinue antiplatelets and anticoagulation if evidence of bleeding, currently benefit is greater than risk     Acute kidney injury  -BUN 31, creatinine 2.13 on admission  -Monitor and trend labs  -Avoid hypotension, NSAIDs, and nephrotoxic medications  -Nephrology consulted     Hypokalemia  -Replacement ordered  -Monitor and trend labs     Hypomagnesemia  -Replacement ordered  -Monitor and trend labs     Closed fracture of multiple ribs of both sides, secondary to CPR  Closed, minimally displaced, fracture of body of sternum, secondary to CPR  -Findings on outlying facility CT  -Further recommendations as indicated, currently stable     Acute encephalopathy, concern for anoxic injury  -CT of the head was negative for intracranial abnormalities  -Patient continues unresponsive, and was not on sedation prior to arrival to this facility  -Likely very poor prognosis  -Consider neurology consultation     Low serum thyroid stimulating hormone (TSH)  -Will recheck thyroid studies in AM     Type 2 diabetes mellitus, without long-term current use of  insulin  -strict glycemic control recommended  -Accuchecks AC/HS or Q6H with sliding scale insulin, based on diet  -Hold oral diabetic medications until out of ICU.     Palliative care consultation in AM  NG Placed, dietitian consult for tube feed recommendations.     Code Status: CPR, Full Interventions  VTE Prophylaxis: Heparin SC, transition per COVID 19 recommendations  PUD Prophylaxis: Protonix      [place] & SUBJECTIVE     Christiane Heck is a 71 y.o. 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  The following has been culminated from review of outlying facility documentation, as patient presents to this facility intubated, sedated with no family currently present.  Additional past medical history includes stage IV lung cancer.  [facility] with a 1 day history of headache, progressive shortness of breath, coughing, vomiting.  EMS upon their arrival stated that patient's oxygen saturation was 50%.  Patient was immediately started on BiPAP with 100% FiO2 with an improvement in oxygen saturation to the 70s.  At that time, patient was alert and responsive.  In route, patient became bradycardic with IV atropine administered.  Patient then followed with  "unresponsiveness and asystole on monitor.  2 rounds of IV epinephrine was administered with CPR.  Upon arrival to outlying facility, patient continued in asystole, in which CPR continued.  Patient initially had a supraglottic airway upon presentation.  Additional IV epinephrine was administered along with aggressive IV fluid bolusing.  Initial heart rhythm was asystole, and upon pulse check, patient was in PEA.  Patient was then intubated.  Patient had a total of 3 additional rounds of epinephrine, and then ROSC was obtained.  Patient remained unresponsive, with fixed and dilated pupils.  Initially patient's oxygen saturation was 75, but after patient was placed on the ventilator, oxygen saturation improved to 96% with a FiO2 of 100%.  Patient was discussed with patient's family, in which patient is a full code.  It is reported that patient lives with her 15-year-old, 13-year-old, and 11-year-old grandchildren, who were the ones that called EMS.     In the ED, labs were obtained with labs as follows: WBC 21.95, hemoglobin 14.6, hematocrit 49.9, platelets 154, alkaline phosphatase 113, , , sodium 140, potassium 3.2, chloride 97, CO2 14.7, BUN 24, creatinine 2.13, glucose 517, troponin 1.3, lactate 8.7.  Patient had a urinalysis completed which revealed 3+ bacteria and urine culture was reflexed.  Patient also was given 2 L of IV fluids, Rocephin 2 g, Lasix 20 mg, sodium bicarbonate x1 amp.  Chest x-ray reveals \"diffuse bilateral lung opacities with some partial scarring of the upper left lung.  Lung infiltrates are increased from previous exam of earlier today.  Probable left pleural effusion.\"  CT PE protocol was also obtained with the following findings: \"No central pulmonary embolism, aortic aneurysm or dissection.  Minimally displaced fracture of the inferior sternal body with multiple bilateral anterior rib fractures.  Multiple pulmonary nodules with a left inferior hilar mass and mediastinal " "adenopathy similar to slightly increased in size from comparison exam.  Superimposed airspace opacity seen in the left upper lobe anteriorly and left lower lobe suspicious for superimposed atelectasis and infiltrate.  Correlate clinically and interval follow-up can document resolution.  Mosaic attenuation seen bilaterally with relative toxicity of vasculature in the lucent regions favoring air trapping and diffuse small airway disease.\"  CT of the head was obtained with the following impression: \"Study is limited by motion and resultant streak artifact.  Within these limitations, there is no CT evidence for acute intracranial hemorrhage or acute territorial infarction.  Initially, [facility] was contacted for transfer to ICU for further treatment and evaluation of patient condition.  Initial plans were for potential TTM, as patient did remain unresponsive, but unfortunately there were delays in transfer and outlying facility did not initiate hypothermia measures, and patient's ROSC was greater than 6 hours outside the window for current evidence-based recommendations.     Pulmonary/Intensivist service was contacted for admission to ICU and further evaluation and treatment.    2/4: Intubated, sedated    REVIEW OF SYSTEMS:  Review of systems could not be obtained due to   patient sedation status. patient intubated.    MEDICATIONS     SCHEDULED MEDICATIONS:   Acetylcysteine, 600 mg, Per G Tube, BID  albuterol sulfate HFA, 6 puff, Inhalation, Q4H - RT  ascorbic acid, 500 mg, Oral, Daily  aspirin, 324 mg, Per G Tube, Daily  budesonide-formoterol, 2 puff, Inhalation, BID - RT  chlorhexidine, 15 mL, Mouth/Throat, Q12H  cholecalciferol, 3,000 Units, Oral, Daily  clopidogrel, 75 mg, Oral, Daily  dexamethasone, 6 mg, Intravenous, Daily  furosemide, 80 mg, Intravenous, Once  guaiFENesin, 400 mg, Oral, Q6H  heparin (porcine), 5,000 Units, Subcutaneous, Q12H  insulin lispro, 0-9 Units, Subcutaneous, Q6H  pantoprazole, " "40 mg, Intravenous, Q12H  piperacillin-tazobactam, 3.375 g, Intravenous, Q8H  sodium chloride, 10 mL, Intravenous, Q12H  zinc sulfate, 220 mg, Oral, Daily        CONTINUOUS INFUSIONS:   fentanyl 10 mcg/mL,  mcg/hr, Last Rate: 100 mcg/hr (02/04/21 0809)  Pharmacy to Dose Zosyn,   sodium bicarbonate drip (greater than 75 mEq/bag), 100 mEq, Last Rate: 100 mEq (02/04/21 0050)        PRN MEDS:  •  acetaminophen **OR** acetaminophen  •  aluminum-magnesium hydroxide-simethicone  •  dextrose  •  dextrose  •  glucagon (human recombinant)  •  insulin lispro **AND** insulin lispro  •  ipratropium-albuterol  •  midazolam  •  nitroglycerin  •  ondansetron **OR** ondansetron  •  Pharmacy to Dose Zosyn  •  sodium chloride    OBJECTIVE     VITAL SIGNS:  BP 93/53   Pulse 109   Temp 97.6 °F (36.4 °C) (Axillary)   Resp (!) 30   Ht 154.9 cm (61\")   Wt 97 kg (213 lb 13.5 oz)   LMP  (LMP Unknown)   SpO2 96%   BMI 40.41 kg/m²     I/O FROM PREVIOUS 3 SHIFTS:  I/O last 3 completed shifts:  In: 60 [NG/GT:60]  Out: 20 [Urine:20]    I/O THIS SHIFT:  No intake/output data recorded.    BOWEL MOVEMENTS:  Stool Output  Stool Amount: small (02/04/21 0815)     PHYSICAL EXAM:  Constitutional:  Well developed, well nourished, no acute distress, non-toxic appearance, chronically ill-appearing  Eyes: Pupils fixed, dilated, nonreactive to light, conjunctiva normal, unable to assess EOM, patient unresponsive  HENT:  Atraumatic, external ears normal, nose normal, oropharynx moist, no pharyngeal exudates. Neck-normal range of motion, no tenderness, supple, trachea midline  Respiratory: Coarse breath sounds throughout, diminished bibasilar breath sounds, scattered wheezes and rhonchi noted, bibasilar Rales, tachypneic but with non-labored respirations without accessory muscle use  Cardiovascular: Elevated rate, slightly irregular rhythm,+PJ, no gallops, no rubs   GI: Obese, soft, nondistended, normal bowel sounds, nontender, no organomegaly, " no mass, no rebound, no guarding   :  No costovertebral angle tenderness, Schneider catheter in place  Musculoskeletal: Bilateral lower extremity edema 2+, no tenderness, no deformities  Integument:  Well hydrated, no rash   Lymphatic:  No lymphadenopathy noted   Neurologic: Intubated, sedated, will not follow commands, minimally withdraws to pain  Psychiatric: Intubated, sedated      RESULTS     LABS:  Lab Results (last 24 hours)     Procedure Component Value Units Date/Time    Respiratory Culture - Sputum, ET Suction [[id number]] Collected: 02/04/21 0840    Specimen: Sputum from ET Suction Updated: 02/04/21 0904    Lactic Acid, Plasma [[id number]]  (Abnormal) Collected: 02/04/21 0738    Specimen: Blood Updated: 02/04/21 0810     Lactate 5.3 mmol/L     Blood Gas, Arterial - [[id number]]  (Abnormal) Collected: 02/04/21 0645    Specimen: Arterial Blood Updated: 02/04/21 0648     Site Right Brachial     Jesus's Test Positive     pH, Arterial 7.294 pH units      pCO2, Arterial 35.8 mm Hg      pO2, Arterial 91.0 mm Hg      HCO3, Arterial 17.4 mmol/L      Base Excess, Arterial -8.3 mmol/L      Comment: Serial Number: [id number]:  [id number]        O2 Saturation, Arterial 96.1 %      CO2 Content 18.5 mmol/L      Barometric Pressure for Blood Gas --     Comment: N/A        Modality Adult Vent     FIO2 80 %      Ventilator Mode ;AC     Set Tidal Volume 520     PEEP 12     Hemodilution No     Respiratory Rate 28    POC Glucose Once [[id number]]  (Abnormal) Collected: 02/04/21 0537    Specimen: Blood Updated: 02/04/21 0558     Glucose 215 mg/dL      Comment: Serial Number: [id number]:  [id number]       Lactate Dehydrogenase [[id number]]  (Abnormal) Collected: 02/04/21 0228    Specimen: Blood Updated: 02/04/21 0433     LDH >2,500 U/L      Comment: Specimen hemolyzed.  Results may be affected.       Ferritin [[id number]]  (Abnormal) Collected: 02/04/21 0228    Specimen: Blood Updated: 02/04/21 0430     Ferritin 12,028.00 ng/mL      Narrative:      Results may be falsely decreased if patient taking Biotin.      proBNP [[id number]]  (Abnormal) Collected: 02/04/21 0228    Specimen: Blood Updated: 02/04/21 0430     proBNP 29,915.0 pg/mL     Narrative:      Among patients with dyspnea, NT-proBNP is highly sensitive for the detection of acute congestive heart failure. In addition NT-proBNP of <300 pg/ml effectively rules out acute congestive heart failure with 99% negative predictive value.    Results may be falsely decreased if patient taking Biotin.      Troponin [[id number]]  (Abnormal) Collected: 02/04/21 0228    Specimen: Blood Updated: 02/04/21 0427     Troponin T 0.598 ng/mL     Narrative:      Troponin T Reference Range:  <= 0.03 ng/mL-   Negative for AMI  >0.03 ng/mL-     Abnormal for myocardial necrosis.  Clinicians would have to utilize clinical acumen, EKG, Troponin and serial changes to determine if it is an Acute Myocardial Infarction or myocardial injury due to an underlying chronic condition.       Results may be falsely decreased if patient taking Biotin.      CK [[id number]]  (Abnormal) Collected: 02/04/21 0228    Specimen: Blood Updated: 02/04/21 0420     Creatine Kinase 5,494 U/L     Comprehensive Metabolic Panel [[id number]]  (Abnormal) Collected: 02/04/21 0228    Specimen: Blood Updated: 02/04/21 0420     Glucose 212 mg/dL      BUN 40 mg/dL      Creatinine 2.80 mg/dL      Sodium 142 mmol/L      Potassium 3.8 mmol/L      Chloride 106 mmol/L      CO2 19.0 mmol/L      Calcium 8.5 mg/dL      Total Protein 5.8 g/dL      Albumin 2.80 g/dL      ALT (SGPT) 976 U/L      AST (SGOT) 2,875 U/L      Alkaline Phosphatase 144 U/L      Total Bilirubin 1.1 mg/dL      eGFR Non African Amer 17 mL/min/1.73      Globulin 3.0 gm/dL      A/G Ratio 0.9 g/dL      BUN/Creatinine Ratio 14.3     Anion Gap 17.0 mmol/L     Narrative:      GFR Normal >60  Chronic Kidney Disease <60  Kidney Failure <15      T4, Free [[id number]]  (Abnormal) Collected: 02/04/21  0228    Specimen: Blood Updated: 02/04/21 0408     Free T4 1.82 ng/dL     Narrative:      Results may be falsely increased if patient taking Biotin.      Lipid Panel [[id number]]  (Abnormal) Collected: 02/04/21 0228    Specimen: Blood Updated: 02/04/21 0402     Total Cholesterol 52 mg/dL      Triglycerides 130 mg/dL      HDL Cholesterol 25 mg/dL      LDL Cholesterol  <5 mg/dL      VLDL Cholesterol --     Comment: Unable to calculate        LDL/HDL Ratio 0.04    Narrative:      Cholesterol Reference Ranges  (U.S. Department of Health and Human Services ATP III Classifications)    Desirable          <200 mg/dL  Borderline High    200-239 mg/dL  High Risk          >240 mg/dL      Triglyceride Reference Ranges  (U.S. Department of Health and Human Services ATP III Classifications)    Normal           <150 mg/dL  Borderline High  150-199 mg/dL  High             200-499 mg/dL  Very High        >500 mg/dL    HDL Reference Ranges  (U.S. Department of Health and Human Services ATP III Classifcations)    Low     <40 mg/dl (major risk factor for CHD)  High    >60 mg/dl ('negative' risk factor for CHD)        LDL Reference Ranges  (U.S. Department of Health and Human Services ATP III Classifcations)    Optimal          <100 mg/dL  Near Optimal     100-129 mg/dL  Borderline High  130-159 mg/dL  High             160-189 mg/dL  Very High        >189 mg/dL    Magnesium [[id number]]  (Normal) Collected: 02/04/21 0228    Specimen: Blood Updated: 02/04/21 0402     Magnesium 2.0 mg/dL     Phosphorus [[id number]]  (Abnormal) Collected: 02/04/21 0228    Specimen: Blood Updated: 02/04/21 0402     Phosphorus 4.9 mg/dL     C-reactive Protein [[id number]]  (Abnormal) Collected: 02/04/21 0228    Specimen: Blood Updated: 02/04/21 0402     C-Reactive Protein 21.89 mg/dL     TSH [[id number]]  (Abnormal) Collected: 02/04/21 0228    Specimen: Blood Updated: 02/04/21 0350     TSH 0.021 uIU/mL     D-dimer, Quantitative [[id number]]  (Abnormal) Collected:  02/04/21 0228    Specimen: Blood Updated: 02/04/21 0340     D-Dimer, Quantitative >35.20 mg/L (FEU)     Narrative:      Reference Range  --------------------------------------------------------------------     < 0.50   Negative Predictive Value  0.50-0.59   Indeterminate    >= 0.60   Probable VTE             A very low percentage of patients with DVT may yield D-Dimer results   below the cut-off of 0.50 mg/L FEU.  This is known to be more   prevalent in patients with distal DVT.             Results of this test should always be interpreted in conjunction with   the patient's medical history, clinical presentation and other   findings.  Clinical diagnosis should not be based on the result of   INNOVANCE D-Dimer alone.    Protime-INR [[id number]]  (Abnormal) Collected: 02/04/21 0228    Specimen: Blood Updated: 02/04/21 0335     Protime 13.4 Seconds      INR 1.23    Fibrinogen [[id number]]  (Abnormal) Collected: 02/04/21 0228    Specimen: Blood Updated: 02/04/21 0335     Fibrinogen 599 mg/dL     CBC & Differential [[id number]]  (Abnormal) Collected: 02/04/21 0106    Specimen: Blood Updated: 02/04/21 0148    Narrative:      The following orders were created for panel order CBC & Differential.  Procedure                               Abnormality         Status                     ---------                               -----------         ------                     Scan Slide[[id number]]                                       Final result               CBC Auto Differential[[id number]]        Abnormal            Final result                 Please view results for these tests on the individual orders.    CBC Auto Differential [[id number]]  (Abnormal) Collected: 02/04/21 0106    Specimen: Blood Updated: 02/04/21 0148     WBC 11.30 10*3/mm3      RBC 5.09 10*6/mm3      Hemoglobin 13.9 g/dL      Hematocrit 42.2 %      MCV 82.8 fL      MCH 27.3 pg      MCHC 33.0 g/dL      RDW 17.4 %      RDW-SD 51.6 fl      MPV 10.1 fL       Platelets 131 10*3/mm3     Narrative:      The previously reported component NRBC is no longer being reported. Previous result was 0.1 /100 WBC (Reference Range: 0.0-0.2 /100 WBC) on 2/4/2021 at 0129 EST.    Scan Slide [[id number]] Collected: 02/04/21 0106    Specimen: Blood Updated: 02/04/21 0148     Scan Slide --     Comment: See Manual Differential Results       Manual Differential [[id number]]  (Abnormal) Collected: 02/04/21 0106    Specimen: Blood Updated: 02/04/21 0148     Neutrophil % 66.0 %      Lymphocyte % 2.0 %      Monocyte % 2.0 %      Bands %  29.0 %      Metamyelocyte % 1.0 %      Neutrophils Absolute 10.74 10*3/mm3      Lymphocytes Absolute 0.23 10*3/mm3      Monocytes Absolute 0.23 10*3/mm3      Anisocytosis Slight/1+     Stoddard Cells Slight/1+     Microcytes Slight/1+     Poikilocytes Slight/1+     WBC Morphology Normal     Platelet Estimate Decreased     Large Platelets Slight/1+     Giant Platelets Slight/1+    Legionella Antigen, Urine - Urine, Urine, Clean Catch [[id number]]  (Normal) Collected: 02/04/21 0051    Specimen: Urine, Clean Catch Updated: 02/04/21 0138     LEGIONELLA ANTIGEN, URINE Negative    S. Pneumo Ag Urine or CSF - Urine, Urine, Clean Catch [[id number]]  (Normal) Collected: 02/04/21 0051    Specimen: Urine, Clean Catch Updated: 02/04/21 0138     Strep Pneumo Ag Negative    Urinalysis, Microscopic Only - Urine, Clean Catch [[id number]]  (Abnormal) Collected: 02/04/21 0051    Specimen: Urine, Clean Catch Updated: 02/04/21 0128     RBC, UA 31-50 /HPF      WBC, UA 21-30 /HPF      Bacteria, UA 3+ /HPF      Squamous Epithelial Cells, UA 0-2 /HPF      Hyaline Casts, UA None Seen /LPF      Methodology Manual Light Microscopy    Urine Culture - Urine, Urine, Clean Catch [[id number]] Collected: 02/04/21 0051    Specimen: Urine, Clean Catch Updated: 02/04/21 0128    Urinalysis With Culture If Indicated - Urine, Clean Catch [[id number]]  (Abnormal) Collected: 02/04/21 0051    Specimen: Urine, Clean  Catch Updated: 02/04/21 0124     Color, UA Dark Yellow     Comment: Result checked         Appearance, UA Slightly Cloudy     Comment: Result checked         pH, UA 5.5     Specific Gravity, UA 1.041     Glucose,  mg/dL (1+)     Ketones, UA Trace     Bilirubin, UA Small (1+)     Comment: Confirmation testing is unavailable.  A serum bilirubin is recommended for further assessment.        Blood, UA Large (3+)     Protein, UA >=300 mg/dL (3+)     Leuk Esterase, UA Small (1+)     Nitrite, UA Negative     Urobilinogen, UA 1.0 E.U./dL    POC Glucose Once [[id number]]  (Abnormal) Collected: 02/04/21 0121    Specimen: Blood Updated: 02/04/21 0122     Glucose 192 mg/dL      Comment: Serial Number: [id number]  [id number]       Lactic Acid, Reflex [[id number]]  (Abnormal) Collected: 02/03/21 2211    Specimen: Blood Updated: 02/03/21 2253     Lactate 6.8 mmol/L     Blood Gas, Arterial - [[id number]]  (Abnormal) Collected: 02/03/21 2228    Specimen: Arterial Blood Updated: 02/03/21 2230     Site Right Brachial     Jesus's Test Positive     pH, Arterial 7.285 pH units      pCO2, Arterial 35.8 mm Hg      pO2, Arterial 67.3 mm Hg      HCO3, Arterial 17.0 mmol/L      Base Excess, Arterial -8.8 mmol/L      Comment: Serial Number: [id number]  [id number]        O2 Saturation, Arterial 90.9 %      CO2 Content 18.1 mmol/L      Barometric Pressure for Blood Gas --     Comment: N/A        Modality Adult Vent     FIO2 90 %      Ventilator Mode ;AC     Set Tidal Volume 520     PEEP 12     Hemodilution No     Respiratory Rate 28    Troponin [[id number]]  (Abnormal) Collected: 02/03/21 1800    Specimen: Blood Updated: 02/03/21 2217     Troponin T 0.457 ng/mL     Narrative:      Troponin T Reference Range:  <= 0.03 ng/mL-   Negative for AMI  >0.03 ng/mL-     Abnormal for myocardial necrosis.  Clinicians would have to utilize clinical acumen, EKG, Troponin and serial changes to determine if it is an Acute Myocardial Infarction or  myocardial injury due to an underlying chronic condition.       Results may be falsely decreased if patient taking Biotin.      Lactic Acid, Reflex Timer (This will reflex a repeat order 3-3:15 hours after ordered.) [[id number]] Collected: 02/03/21 1800    Specimen: Blood Updated: 02/03/21 2146     Hold Tube Hold for add-ons.     Comment: Auto resulted.       MRSA Screen, PCR (Inpatient) - Swab, Nares [[id number]]  (Normal) Collected: 02/03/21 1725    Specimen: Swab from Nares Updated: 02/03/21 2032     MRSA PCR No MRSA Detected    Comprehensive Metabolic Panel [[id number]]  (Abnormal) Collected: 02/03/21 1800    Specimen: Blood Updated: 02/03/21 1852     Glucose 251 mg/dL      BUN 31 mg/dL      Creatinine 2.13 mg/dL      Sodium 143 mmol/L      Potassium 3.3 mmol/L      Comment: Slight hemolysis detected by analyzer. Results may be affected.        Chloride 107 mmol/L      CO2 16.0 mmol/L      Calcium 8.5 mg/dL      Total Protein 6.2 g/dL      Albumin 2.80 g/dL      ALT (SGPT) 1,169 U/L      AST (SGOT) 3,783 U/L      Alkaline Phosphatase 136 U/L      Total Bilirubin 1.1 mg/dL      eGFR Non African Amer 23 mL/min/1.73      Globulin 3.4 gm/dL      A/G Ratio 0.8 g/dL      BUN/Creatinine Ratio 14.6     Anion Gap 20.0 mmol/L     Narrative:      GFR Normal >60  Chronic Kidney Disease <60  Kidney Failure <15      CBC & Differential [[id number]]  (Abnormal) Collected: 02/03/21 1800    Specimen: Blood Updated: 02/03/21 1847    Narrative:      The following orders were created for panel order CBC & Differential.  Procedure                               Abnormality         Status                     ---------                               -----------         ------                     Scan Slide[[id number]]                   Normal              Final result               CBC Auto Differential[[id number]]        Abnormal            Final result                 Please view results for these tests on the individual orders.    CBC Auto  Differential [[id number]]  (Abnormal) Collected: 02/03/21 1800    Specimen: Blood Updated: 02/03/21 1847     WBC 13.70 10*3/mm3      RBC 5.21 10*6/mm3      Hemoglobin 14.3 g/dL      Hematocrit 44.2 %      MCV 84.9 fL      MCH 27.5 pg      MCHC 32.4 g/dL      RDW 16.8 %      RDW-SD 51.2 fl      MPV 8.6 fL      Platelets 113 10*3/mm3      Neutrophil % 93.5 %      Lymphocyte % 3.1 %      Monocyte % 3.3 %      Eosinophil % 0.0 %      Basophil % 0.1 %      Neutrophils, Absolute 12.80 10*3/mm3      Lymphocytes, Absolute 0.40 10*3/mm3      Monocytes, Absolute 0.50 10*3/mm3      Eosinophils, Absolute 0.00 10*3/mm3      Basophils, Absolute 0.00 10*3/mm3      nRBC 0.6 /100 WBC     Scan Slide [[id number]]  (Normal) Collected: 02/03/21 1800    Specimen: Blood Updated: 02/03/21 1847     RBC Morphology Normal     WBC Morphology Normal     Platelet Morphology Normal    Narrative:      Slide Reviewed        Cortisol [[id number]] Collected: 02/03/21 1800    Specimen: Blood Updated: 02/03/21 1841     Cortisol 23.08 mcg/dL     Narrative:      Cortisol Reference Ranges:    Cortisol 6AM - 10AM Range: 6.02-18.40 mcg/dl  Cortisol 4PM - 8PM Range: 2.68-10.50 mcg/dl      Results may be falsely increased if patient taking Biotin.      TSH [[id number]]  (Abnormal) Collected: 02/03/21 1800    Specimen: Blood Updated: 02/03/21 1841     TSH 0.025 uIU/mL     Procalcitonin [[id number]]  (Abnormal) Collected: 02/03/21 1800    Specimen: Blood Updated: 02/03/21 1841     Procalcitonin 14.49 ng/mL     Narrative:      As a Marker for Sepsis (Non-Neonates):   1. <0.5 ng/mL represents a low risk of severe sepsis and/or septic shock.  1. >2 ng/mL represents a high risk of severe sepsis and/or septic shock.    As a Marker for Lower Respiratory Tract Infections that require antibiotic therapy:  PCT on Admission     Antibiotic Therapy             6-12 Hrs later  > 0.5                Strongly Recommended            >0.25 - <0.5         Recommended  0.1 - 0.25        "    Discouraged                   Remeasure/reassess PCT  <0.1                 Strongly Discouraged          Remeasure/reassess PCT      As 28 day mortality risk marker: \"Change in Procalcitonin Result\" (> 80 % or <=80 %) if Day 0 (or Day 1) and Day 4 values are available. Refer to [email or web address]   Change in PCT <=80 %   A decrease of PCT levels below or equal to 80 % defines a positive change in PCT test result representing a higher risk for 28-day all-cause mortality of patients diagnosed with severe sepsis or septic shock.  Change in PCT > 80 %   A decrease of PCT levels of more than 80 % defines a negative change in PCT result representing a lower risk for 28-day all-cause mortality of patients diagnosed with severe sepsis or septic shock.                Results may be falsely decreased if patient taking Biotin.     Phosphorus [[id number]]  (Normal) Collected: 02/03/21 1800    Specimen: Blood Updated: 02/03/21 1838     Phosphorus 4.3 mg/dL     C-reactive Protein [[id number]]  (Abnormal) Collected: 02/03/21 1800    Specimen: Blood Updated: 02/03/21 1838     C-Reactive Protein 18.66 mg/dL     Magnesium [[id number]]  (Normal) Collected: 02/03/21 1800    Specimen: Blood Updated: 02/03/21 1838     Magnesium 1.8 mg/dL     Lactic Acid, Plasma [[id number]]  (Abnormal) Collected: 02/03/21 1800    Specimen: Blood Updated: 02/03/21 1836     Lactate 7.5 mmol/L     Protime-INR [[id number]]  (Abnormal) Collected: 02/03/21 1800    Specimen: Blood Updated: 02/03/21 1835     Protime 14.0 Seconds      INR 1.29    aPTT [[id number]]  (Abnormal) Collected: 02/03/21 1800    Specimen: Blood Updated: 02/03/21 1835     PTT 23.2 seconds     Calcium, Ionized [[id number]]  (Abnormal) Collected: 02/03/21 1800    Specimen: Blood Updated: 02/03/21 1820     Ionized Calcium 1.15 mmol/L     POC Glucose Once [[id number]]  (Abnormal) Collected: 02/03/21 1742    Specimen: Blood Updated: 02/03/21 1809     Glucose 217 mg/dL      " Comment: Serial Number: [id number]:  [id number]       Blood Culture - Blood, Hand, Left [[id number]] Collected: 02/03/21 1800    Specimen: Blood from Hand, Left Updated: 02/03/21 1806    Blood Culture - Blood, Arm, Right [[id number]] Collected: 02/03/21 1800    Specimen: Blood from Arm, Right Updated: 02/03/21 1806    Blood Gas, Arterial - [[id number]]  (Abnormal) Collected: 02/03/21 1706    Specimen: Arterial Blood Updated: 02/03/21 1728     Site Right Radial     Jesus's Test Positive     pH, Arterial 7.231 pH units      pCO2, Arterial 45.5 mm Hg      pO2, Arterial 52.4 mm Hg      HCO3, Arterial 19.1 mmol/L      Base Excess, Arterial -8.4 mmol/L      Comment: Serial Number: [id number]:  [id number]        O2 Saturation, Arterial 80.0 %      CO2 Content 20.5 mmol/L      Barometric Pressure for Blood Gas --     Comment: N/A        Modality Adult Vent     FIO2 90 %      Ventilator Mode ;AC     Set Tidal Volume 500     PEEP 10     Hemodilution No     Respiratory Rate 24           RADIOLOGY:  Xr Chest 1 View    Result Date: 2/4/2021   DATE OF EXAM: 2/4/2021 5:40 AM  PROCEDURE: XR CHEST 1 VW-  INDICATIONS: Intubated Patient  COMPARISON: 2/3/2021  TECHNIQUE: Portable chest radiograph.  FINDINGS:  Placement of an esophagogastric tube courses below the diaphragm. Endotracheal tube tip 4 cm above the adonay. Stable cardiomegaly. Multifocal airspace disease throughout the lungs, not significantly changed. No pneumothorax. Suspected small left pleural effusion.      1. Placement of an esophagogastric tube is below the diaphragm. 2. Stable endotracheal tube. 3. No change in multifocal airspace disease throughout the lungs with small left pleural effusion.  Electronically Signed By-Demario Simmons MD On:2/4/2021 7:43 AM This report was finalized on [id number] by  Demario Simmons MD.    Xr Chest 1 View    Result Date: 2/3/2021  Examination: XR CHEST 1 VW-  Date of Exam: 2/3/2021 4:35 PM  Indication: Confirm ET Tube Placement.   Comparison: 11/10/2020  Technique: 1 view of the chest  Findings: There is an endotracheal tube in the midthoracic trachea. The heart size and pulmonary vascular markings are normal. There are diffuse bilateral confluent alveolar airspace opacities consistent with multifocal pneumonia. There is more dense consolidation in the left lung base.      Diffuse bilateral alveolar airspace disease consistent with multifocal pneumonia. ET tube appears properly positioned.  Electronically Signed By-Scooter Nation MD On:2/3/2021 5:22 PM This report was finalized on [id number] by  Scooter Nation MD.    Xr Outside Films    Result Date: 2/4/2021  This procedure was auto-finalized with no dictation required.    Xr Outside Films    Result Date: 2/4/2021  This procedure was auto-finalized with no dictation required.    Xr Abdomen Kub    Result Date: 2/3/2021  DATE OF EXAM: 2/3/2021 6:18 PM  PROCEDURE: XR ABDOMEN KUB-  INDICATIONS: ng placement  COMPARISON: No Comparisons Available  TECHNIQUE: Single radiographic view of the abdomen was obtained.  FINDINGS: There is a nasogastric tube in the mildly dilated stomach. There are scattered pockets of large and small bowel gas in a nonspecific but nonobstructive pattern.      Nasogastric tube is in the stomach.  Electronically Signed By-Scooter Nation MD On:2/3/2021 6:30 PM This report was finalized on [id number] by  Scooter Nation MD.    Ct Outside Films    Result Date: 2/4/2021  This procedure was auto-finalized with no dictation required.    Ct Outside Films    Result Date: 2/4/2021  This procedure was auto-finalized with no dictation required.    Ct Outside Films    Result Date: 2/4/2021  This procedure was auto-finalized with no dictation required.    Ct Outside Films    Result Date: 2/4/2021  This procedure was auto-finalized with no dictation required.      ECHOCARDIOGRAM:        I reviewed the patient's new clinical results.    This note has been scribed by me for pulmonary  attending physician.    Electronically signed by DWIGHT Baez, 02/04/21 at 09:40 EST.     I personally have examined  and interviewed the patient. I have reviewed the history, data, problems, assessment and plan with our NP.  Critical care time in direct medical management (   ) minutes   Pamela Cope MD D,ABSM  2/4/2021

## 2021-02-04 NOTE — CONSULTS
Cardiology Consult Note      REQUESTING PHYSICIAN    Pamela Cope MD    PATIENT IDENTIFICATION  Name: Chritsiane Heck  Age: 71 y.o.  Sex: female  :  1950  MRN: [id number]              Cardiology assessment and plan      Respiratory failure  Cardiopulmonary arrest  Prior history of known coronary disease  Nonspecific elevation of troponinlikely demand ischemia/type II myocardial infarction  Active COVID-19 infection   Elevated CPK  Elevated D-dimer and ferritin  Elevated proBNP  Acute renal failure  Severe lactic acidosis  Severe metabolic acidosis  Shock liver  Acute kidney injury            Echocardiogram to assess LV systolic function  Twelve-lead EKG shows normal sinus rhythm nonspecific ST changes and occasional PVCs  Further recommendations based on patient course  Continue supportive care from cardiac standpoint at this point        Impressions:  Moderate obstructive disease involving the mid LAD plan to manage medically at this time  Patent stent in the mid right coronary artery with mild in-stent restenosis  Normal LV systolic function/Normal wall motion  Normal left-sided filling pressures  Left ventricular end-diastolic pressure was 12 mmHg  LV ejection fraction of 60%      Recommendations:  Management for obstructive coronary artery disease  Continued aggressive risk factor modification  Optimize antianginal therapy  Test results discussed with patient           REASON FOR CONSULTATION:  71-year-old female with history of coronary artery disease and percutaneous coronary intervention 2015 to the RCA with CAPRICE, congestive heart failure with preserved ejection fraction, stage IV lung cancer, hypertension, dyslipidemia, spinal stenosis, GERD, former smoker    Left heart catheterization 2020:  Moderate obstructive disease involving the mid LAD plan to manage medically at this time  Patent stent in the mid right coronary artery with mild in-stent restenosis  Normal LV systolic  function/Normal wall motion  Normal left-sided filling pressures  Left ventricular end-diastolic pressure was 12 mmHg  LV ejection fraction of 60%  Medical management recommended      CC:  Cardiopulmonary arrest  Elevated troponin/non-ST elevation MI     HISTORY OF PRESENT ILLNESS:   Patient is intubated, sedated and in COVID-19 isolation.  Unable to obtain history from patient.  HPI is obtained from admission note.  Patient presented to [facility] via EMS initially after reporting approximately 24-hour history of headache, progressively worsening shortness of breath, cough, vomiting.  According to transfer records, EMS reported SPO2 50% on their arrival.  She was immediately started on BiPAP with improvement in her oxygenation.  At that time patient reportedly was alert and responsive.  In route to OLF, patient became bradycardic and IV atropine was administered.  She then became unresponsive and asystole noted on monitor.  CPR was started and she was 2 rounds of IV epinephrine.  She was intubated, bolused with IV fluids.  She also developed PEA.  3 additional rounds of epinephrine and ROSC was then obtained.  Her SPO2 improved to 100% after being placed on ventilator support.  Patient is full code.  Work-up included WBC 21.95, platelets 154, alk phos 113, , .  Potassium 3.2, creatinine 2.13.  UA with 3+ bacteria she was given 2 L IV fluid, 2 g Rocephin, Lasix 20 mg and sodium bicarb x1 amp.  Chest CT with no evidence of PE.  Multiple pulmonary nodules.  Troponin 1.1 at outlying facility.  Here, troponin 0 0.457, 0.598.  proBNP 29,915.  Patient's rhythm shows sinus tachycardia with occasional isolated PVCs and rate of 107.  Arterial blood pressure 162/66.  Patient is intubated and sedated in the intensive care unit      REVIEW OF SYSTEMS:  Review of systems could not be obtained due to   patient intubated.    OBJECTIVE   FiO2 80%    ASSESSMENT/PLAN  Cardiopulmonary arrest  Acute  "respiratory failure with hypoxia  Elevated cardiac enzymes-likely NSTEMI 2 secondary to hypoxia and acute illness  COVID-19 pneumonia  Septic shock/liver shock  Acute kidney injury  Coronary artery disease  History of prior percutaneous coronary intervention  History of lung cancer    Recommendations  Vasopressor support as needed  Rhythm currently sinus.  Ventilator per pulmonary/intensivist  Patient had recent heart catheterization with patent RCA stent, moderate LAD stent-plan to treat medically  Patient has normal LV systolic function  Monitor and replete electrolytes per protocol  Monitor rhythm closely  Aspirin, Plavix  Patient receiving subcu heparin  Further recommendations per cardiologist        Vital Signs  Visit Vitals  BP 93/53   Pulse 100   Temp 98.9 °F (37.2 °C) (Axillary)   Resp (!) 30   Ht 154.9 cm (61\")   Wt 97 kg (213 lb 13.5 oz)   LMP  (LMP Unknown)   SpO2 93%   BMI 40.41 kg/m²     Oxygen Therapy  SpO2: 93 %  Pulse Oximetry Type: Continuous  Device (Oxygen Therapy): ventilator  Oxygen Concentration (%): 75  Flowsheet Rows      First Filed Value   Admission Height  154.9 cm (61\") Documented at 02/03/2021 1840   Admission Weight  87 kg (191 lb 12.8 oz) Documented at 02/03/2021 1633        Intake & Output (last 3 days)       02/01 0701 - 02/02 0700 02/02 0701 - 02/03 0700 02/03 0701 - 02/04 0700 02/04 0701 - 02/05 0700    NG/GT   60     Total Intake(mL/kg)   60 (0.6)     Urine (mL/kg/hr)   20     Total Output   20     Net   +40                 Lines, Drains & Airways    Active LDAs     Name:   Placement date:   Placement time:   Site:   Days:    Peripheral IV 02/03/21 1600 Left Antecubital   02/03/21    1600    Antecubital   less than 1    Peripheral IV 02/04/21 0049 Left Wrist   02/04/21    0049    Wrist   less than 1    Peripheral IV 02/04/21 0000 Distal;Posterior;Right Forearm   02/04/21    0000    Forearm   less than 1    NG/OG Tube Nasogastric Left nostril   02/03/21    1728    Left nostril   " less than 1    Urethral Catheter   02/03/21    1600     less than 1    Hi-Lo Evac ETT 7.5   02/03/21    1600    Oral   less than 1                MEDICAL HISTORY    Past Medical History:   Diagnosis Date   • Anxiety    • Arteriosclerosis of coronary artery 2/3/2021   • Back pain, chronic 2/3/2021   • CAD (coronary artery disease)    • CHF (congestive heart failure) (CMS/[facility])    • Chronic osteoarthritis 2/3/2021   • Congestive heart failure (CMS/HCC) 2/3/2021   • Depressive disorder 2/3/2021   • Diabetes mellitus (CMS/HCC)    • Former smoker, stopped smoking in distant past    • GERD (gastroesophageal reflux disease)    • HLD (hyperlipidemia)    • Hypertension    • Malignant neoplasm of lung (CMS/HCC) 2/3/2021    pt unaware of this dx   • Muscle ache     Muscle ache/cramps   • Neuroendocrine carcinoma (CMS/HCC)    • 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   • Neuropathy    • PUD (peptic ulcer disease) 2/3/2021   • Spinal stenosis     Chronic back pain   • Stented coronary artery 6/4/2016    nancy to rca   • Tobacco abuse 2/3/2021    2-26-18 Pt states quit smoking 50 years ago per MAS/dt        SURGICAL HISTORY    Past Surgical History:   Procedure Laterality Date   • CARDIAC CATHETERIZATION     • CARDIAC CATHETERIZATION N/A 11/12/2020    Procedure: Left Heart Cath;  Surgeon: Brynn Gutierrez MD;  [facility];  Service: Cardiovascular;  Laterality: N/A;   • CARDIAC CATHETERIZATION N/A 11/12/2020    Procedure: Coronary angiography;  Surgeon: Brynn Gutierrez MD;  [facility];  Service: Cardiovascular;  Laterality: N/A;   • CARDIAC CATHETERIZATION N/A 11/12/2020    Procedure: Left ventriculography;  Surgeon: Brynn Gutierrez MD;  [facility];  Service: Cardiovascular;  Laterality: N/A;   • CHOLECYSTECTOMY     • CORONARY STENT PLACEMENT          FAMILY HISTORY    Family History  "  Problem Relation Age of Onset   • Lung cancer Mother    • Lung cancer Sister        SOCIAL HISTORY    Social History     Tobacco Use   • Smoking status: Former Smoker   • Smokeless tobacco: Never Used   Substance Use Topics   • Alcohol use: Never     Frequency: Never        ALLERGIES    Allergies   Allergen Reactions   • Ambien [Zolpidem] Mental Status Change              BP 93/53   Pulse 100   Temp 98.9 °F (37.2 °C) (Axillary)   Resp (!) 30   Ht 154.9 cm (61\")   Wt 97 kg (213 lb 13.5 oz)   LMP  (LMP Unknown)   SpO2 93%   BMI 40.41 kg/m²   Intake/Output last 3 shifts:  I/O last 3 completed shifts:  In: 60 [NG/GT:60]  Out: 20 [Urine:20]  Intake/Output this shift:  No intake/output data recorded.    PHYSICAL EXAM:    No physical exam was performed secondary to COVID-19 isolation, limiting exposure.  Attending physical exam noted.  Pupils fixed and dilated, nonreactive to light.  Coarse breath sounds, does not withdraw to pain, GCS 3    Scheduled Meds:      Acetylcysteine, 600 mg, Per G Tube, BID  albuterol sulfate HFA, 6 puff, Inhalation, Q4H - RT  ascorbic acid, 500 mg, Oral, Daily  aspirin, 324 mg, Per G Tube, Daily  budesonide-formoterol, 2 puff, Inhalation, BID - RT  chlorhexidine, 15 mL, Mouth/Throat, Q12H  cholecalciferol, 3,000 Units, Oral, Daily  clopidogrel, 75 mg, Oral, Daily  dexamethasone, 6 mg, Intravenous, Q12H  guaiFENesin, 400 mg, Oral, Q6H  heparin (porcine), 5,000 Units, Subcutaneous, Q12H  insulin lispro, 0-9 Units, Subcutaneous, Q6H  pantoprazole, 40 mg, Intravenous, Q12H  piperacillin-tazobactam, 3.375 g, Intravenous, Q8H  sodium chloride, 10 mL, Intravenous, Q12H  zinc sulfate, 220 mg, Oral, Daily        Continuous Infusions:    fentanyl 10 mcg/mL,  mcg/hr, Last Rate: 100 mcg/hr (02/04/21 0809)  Pharmacy to Dose Zosyn,   sodium bicarbonate drip (greater than 75 mEq/bag), 100 mEq, Last Rate: 100 mEq (02/04/21 0050)        PRN Meds:    •  acetaminophen **OR** acetaminophen  •  " aluminum-magnesium hydroxide-simethicone  •  dextrose  •  dextrose  •  glucagon (human recombinant)  •  insulin lispro **AND** insulin lispro  •  ipratropium-albuterol  •  midazolam  •  nitroglycerin  •  ondansetron **OR** ondansetron  •  Pharmacy to Dose Zosyn  •  sodium chloride        Results Review:     I reviewed the patient's new clinical results.    CBC    Results from last 7 days   Lab Units 02/04/21  0106 02/03/21  1800   WBC 10*3/mm3 11.30* 13.70*   HEMOGLOBIN g/dL 13.9 14.3   PLATELETS 10*3/mm3 131* 113*     Cr Clearance Estimated Creatinine Clearance: 19.6 mL/min (A) (by C-G formula based on SCr of 2.8 mg/dL (H)).  Coag   Results from last 7 days   Lab Units 02/04/21  0228 02/03/21  1800   INR  1.23* 1.29*   APTT seconds  --  23.2*     HbA1C No results found for: HGBA1C  Blood Glucose   Glucose   Date/Time Value Ref Range Status   02/04/2021 1120 304 (H) 70 - 105 mg/dL Final     Comment:     Serial Number: [id number]:  [id number]   02/04/2021 0537 215 (H) 70 - 105 mg/dL Final     Comment:     Serial Number: [id number]:  [id number]   02/04/2021 0121 192 (H) 70 - 105 mg/dL Final     Comment:     Serial Number: [id number]:  [id number]   02/03/2021 1742 217 (H) 70 - 105 mg/dL Final     Comment:     Serial Number: [id number]:  [id number]     Infection   Results from last 7 days   Lab Units 02/03/21  1800   PROCALCITONIN ng/mL 14.49*     CMP   Results from last 7 days   Lab Units 02/04/21  0228 02/03/21  1800   SODIUM mmol/L 142 143   POTASSIUM mmol/L 3.8 3.3*   CHLORIDE mmol/L 106 107   CO2 mmol/L 19.0* 16.0*   BUN mg/dL 40* 31*   CREATININE mg/dL 2.80* 2.13*   GLUCOSE mg/dL 212* 251*   ALBUMIN g/dL 2.80* 2.80*   BILIRUBIN mg/dL 1.1 1.1   ALK PHOS U/L 144* 136*   AST (SGOT) U/L 2,875* 3,783*   ALT (SGPT) U/L 976* 1,169*     ABG    Results from last 7 days   Lab Units 02/04/21  0645 02/03/21  2228 02/03/21  1706   PH, ARTERIAL pH units 7.294* 7.285* 7.231*   PCO2, ARTERIAL mm Hg 35.8  35.8 45.5   PO2 ART mm Hg 91.0 67.3* 52.4*   O2 SATURATION ART % 96.1 90.9* 80.0*   BASE EXCESS ART mmol/L -8.3* -8.8* -8.4*     UA    Results from last 7 days   Lab Units 02/04/21  0051   NITRITE UA  Negative   WBC UA /HPF 21-30*   BACTERIA UA /HPF 3+*   SQUAM EPITHEL UA /HPF 0-2     SUNSHINE  No results found for: POCMETH, POCAMPHET, POCBARBITUR, POCBENZO, POCCOCAINE, POCOPIATES, POCOXYCODO, POCPHENCYC, POCPROPOXY, POCTHC, POCTRICYC  Lysis Labs   Results from last 7 days   Lab Units 02/04/21 0228 02/04/21  0106 02/03/21  1800   INR  1.23*  --  1.29*   APTT seconds  --   --  23.2*   FIBRINOGEN mg/dL 599*  --   --    HEMOGLOBIN g/dL  --  13.9 14.3   PLATELETS 10*3/mm3  --  131* 113*   CREATININE mg/dL 2.80*  --  2.13*     Radiology(recent) Xr Chest 1 View    Result Date: 2/4/2021  1. Placement of an esophagogastric tube is below the diaphragm. 2. Stable endotracheal tube. 3. No change in multifocal airspace disease throughout the lungs with small left pleural effusion.  Electronically Signed By-Demario Simmons MD On:2/4/2021 7:43 AM This report was finalized on [id number] by  Demario Simmons MD.    Xr Chest 1 View    Result Date: 2/3/2021  Diffuse bilateral alveolar airspace disease consistent with multifocal pneumonia. ET tube appears properly positioned.  Electronically Signed By-Scooter Nation MD On:2/3/2021 5:22 PM This report was finalized on [id number] by  Scooter Nation MD.    Xr Abdomen Kub    Result Date: 2/3/2021  Nasogastric tube is in the stomach.  Electronically Signed By-Scooter Nation MD On:2/3/2021 6:30 PM This report was finalized on [id number] by  Scooter Nation MD.        Results from last 7 days   Lab Units 02/04/21 0228   CK TOTAL U/L 5,494*   TROPONIN T ng/mL 0.598*       Xrays, labs reviewed personally by physician.    ECG/EMG Results (most recent)     Procedure Component Value Units Date/Time    ECG 12 Lead [[id number]] Collected: 02/04/21 0850     Updated: 02/04/21 0852     QT Interval 367 ms      Narrative:      HEART RATE= 116  bpm  RR Interval= 520  ms  CO Interval= 124  ms  P Horizontal Axis= -21  deg  P Front Axis= 60  deg  QRSD Interval= 81  ms  QT Interval= 367  ms  QRS Axis= 45  deg  T Wave Axis= -11  deg  - ABNORMAL ECG -  Sinus tachycardia  Multiform ventricular premature complexes  Low voltage, precordial leads  Prolonged QT interval  Electronically Signed By:   Date and Time of Study: 2021-02-04 08:50:52            Medication Review:   I have reviewed the patient's current medication list  Scheduled Meds:Acetylcysteine, 600 mg, Per G Tube, BID  albuterol sulfate HFA, 6 puff, Inhalation, Q4H - RT  ascorbic acid, 500 mg, Oral, Daily  aspirin, 324 mg, Per G Tube, Daily  budesonide-formoterol, 2 puff, Inhalation, BID - RT  chlorhexidine, 15 mL, Mouth/Throat, Q12H  cholecalciferol, 3,000 Units, Oral, Daily  clopidogrel, 75 mg, Oral, Daily  dexamethasone, 6 mg, Intravenous, Q12H  guaiFENesin, 400 mg, Oral, Q6H  heparin (porcine), 5,000 Units, Subcutaneous, Q12H  insulin lispro, 0-9 Units, Subcutaneous, Q6H  pantoprazole, 40 mg, Intravenous, Q12H  piperacillin-tazobactam, 3.375 g, Intravenous, Q8H  sodium chloride, 10 mL, Intravenous, Q12H  zinc sulfate, 220 mg, Oral, Daily      Continuous Infusions:fentanyl 10 mcg/mL,  mcg/hr, Last Rate: 100 mcg/hr (02/04/21 0809)  Pharmacy to Dose Zosyn,   sodium bicarbonate drip (greater than 75 mEq/bag), 100 mEq, Last Rate: 100 mEq (02/04/21 0050)      PRN Meds:.•  acetaminophen **OR** acetaminophen  •  aluminum-magnesium hydroxide-simethicone  •  dextrose  •  dextrose  •  glucagon (human recombinant)  •  insulin lispro **AND** insulin lispro  •  ipratropium-albuterol  •  midazolam  •  nitroglycerin  •  ondansetron **OR** ondansetron  •  Pharmacy to Dose Zosyn  •  sodium chloride    Imaging:  Imaging Results (Last 72 Hours)     Procedure Component Value Units Date/Time    XR Chest 1 View [[id number]] Collected: 02/04/21 0742     Updated: 02/04/21 0745     Narrative:         DATE OF EXAM:   2/4/2021 5:40 AM     PROCEDURE:   XR CHEST 1 VW-     INDICATIONS:   Intubated Patient     COMPARISON:  2/3/2021     TECHNIQUE:   Portable chest radiograph.     FINDINGS:    Placement of an esophagogastric tube courses below the diaphragm.  Endotracheal tube tip 4 cm above the adonay. Stable cardiomegaly.  Multifocal airspace disease throughout the lungs, not significantly  changed. No pneumothorax. Suspected small left pleural effusion.       Impression:      1. Placement of an esophagogastric tube is below the diaphragm.  2. Stable endotracheal tube.  3. No change in multifocal airspace disease throughout the lungs with  small left pleural effusion.     Electronically Signed By-Demario Simmons MD On:2/4/2021 7:43 AM  This report was finalized on [id number] by  Demario Simmons MD.    XR Outside Films [[id number]] Resulted: 02/04/21 0626     Updated: 02/04/21 0626    Narrative:      This procedure was auto-finalized with no dictation required.    XR Outside Films [[id number]] Resulted: 02/04/21 0559     Updated: 02/04/21 0559    Narrative:      This procedure was auto-finalized with no dictation required.    CT Outside Films [[id number]] Resulted: 02/04/21 0558     Updated: 02/04/21 0558    Narrative:      This procedure was auto-finalized with no dictation required.    CT Outside Films [[id number]] Resulted: 02/04/21 0558     Updated: 02/04/21 0558    Narrative:      This procedure was auto-finalized with no dictation required.    CT Outside Films [[id number]] Resulted: 02/04/21 0557     Updated: 02/04/21 0557    Narrative:      This procedure was auto-finalized with no dictation required.    CT Outside Films [[id number]] Resulted: 02/04/21 0557     Updated: 02/04/21 0557    Narrative:      This procedure was auto-finalized with no dictation required.    XR Abdomen KUB [[id number]] Collected: 02/03/21 1829     Updated: 02/03/21 1837    Narrative:      DATE OF EXAM:  2/3/2021 6:18 PM    "  PROCEDURE:  XR ABDOMEN KUB-     INDICATIONS:  ng placement     COMPARISON:  No Comparisons Available     TECHNIQUE:   Single radiographic view of the abdomen was obtained.     FINDINGS:  There is a nasogastric tube in the mildly dilated stomach. There are  scattered pockets of large and small bowel gas in a nonspecific but  nonobstructive pattern.       Impression:      Nasogastric tube is in the stomach.     Electronically Signed By-Scooter Nation MD On:2/3/2021 6:30 PM  This report was finalized on [id number] by  Scooter Nation MD.    XR Chest 1 View [[id number]] Collected: 02/03/21 1721     Updated: 02/03/21 1724    Narrative:      Examination: XR CHEST 1 VW-     Date of Exam: 2/3/2021 4:35 PM     Indication: Confirm ET Tube Placement.     Comparison: 11/10/2020     Technique: 1 view of the chest      Findings:  There is an endotracheal tube in the midthoracic trachea. The heart size  and pulmonary vascular markings are normal. There are diffuse bilateral  confluent alveolar airspace opacities consistent with multifocal  pneumonia. There is more dense consolidation in the left lung base.       Impression:      Diffuse bilateral alveolar airspace disease consistent with multifocal  pneumonia. ET tube appears properly positioned.     Electronically Signed By-Scooter Nation MD On:2/3/2021 5:22 PM  This report was finalized on [id number] by  Scooter Nation MD.            DWIGHT Greenberg  02/04/21  14:12 EST       EMR Dragon/Transcription:   \"Dictated utilizing Dragon dictation\".   Disclaimer: Please note that areas of this note were completed with computer voice recognition software.  Quite often unanticipated grammatical, syntax, homophones, and other interpretive errors are inadvertently transcribed by the computer software. Please excuse any errors that have escaped final proofreading                Electronically signed by DWIGHT Greenberg, 02/04/21, 2:13 PM EST.    "

## 2021-02-04 NOTE — ATTESTATION SEPSIS FOCUSED EXAM
SEPTIC SHOCK FOCUSED EXAM ATTESTATION    I attest that I have reassessed tissue perfusion after the fluid bolus given.    Julian Puente, APRN  02/03/21  23:32 EST

## 2021-02-04 NOTE — CONSULTS
"DNR / Aggressive vs Comfort Measures    Palliative care team discussed pt's status with RN on unit.  Pt unable to participate in goals of care discussion and could not be seen due to COVID precautions.  Pt's son was called to discuss goals of care.  Pt's son reports speaking with RN this morning and feels he is up to date on the pt's current status.  Son reports that while they were in the previous hospital that the physician there told them that he thought there might still be some brain activity still present because of eye movement, and so the pt was transferred here.  Son reports that they had already discussed whether or not they should discontinue life support, but were going along with physician's recommendations at the time.  Son reported, \"If she isn't going to recover from this then I don't know why we would keep doing this.\"  Son affirmed DNR code status and reports that he was awaiting a phone call from our intensivist sometime today.  Emotional support provided.  Will continue to follow.  Thank you for the consult.    Palliative Care Assessment    PC Encounter Information  Palliative Care Patient?: yes  Referral Date: 02/04/21  Referral Time: 0032  Date of Initial Encounter with a Palliative Care Provider: 02/04/21  Time of initial encounter with a Palliative Care Provider: 1100  Time Required for Initial Referral: 30 mins  Additional Visit/Time Required to Achieve Results: 30 mins  Patient Unit at Time of Referral: ICU  Patient Unit Specialty at Time of Referral: critical care  Primary Diagnosis Leading to PC Consult: neurologic/stroke/neurodegenerative  Code Status at Time of Consult: DNR/DNI  Palliative Care Team Members Involved in Consultation:   Symptom Distress  Last Bowel Movement: 02/04/21  Pain Assessment  CPOT Facial Expression: 0-->relaxed, neutral  CPOT Body Movements: 0-->absence of movements  CPOT Muscle Tension: 0-->relaxed  Ventilator Compliance/Vocalization: 0-->tolerating " ventilator or movement  CPOT Score: 0  Screening Status/Interventions Data  Psychosocial Needs: pos  Psychosocial Needs Intervened: yes  Spiritual Needs: unable  Goals of Care/ACP: pos  Goals of Care/ACP Intervened: yes  Health Care Directives/Treatment Preferences  POLST/MOST Initiated: no  Pre-existing AND/MOST/POLST Order: No  Advance Directive Status: Patient does not have advance directive  Interventions  Pain Management Interventions: see MAR(fentanyl increased)  Oral Care: oral rinse provided, swabbed with antiseptic solution, tongue brushed  Family/Support System Care: support provided

## 2021-02-05 NOTE — PROGRESS NOTES
Infectious Diseases Progress Note      LOS: 2 days   Patient Care Team:  Gloria Barron APRN as PCP - General (Nurse Practitioner)  Ivis Manrique MD as Referring Physician (Internal Medicine)  Niko Vazquez MD as Consulting Physician (Cardiology)    Chief Complaint: Intubated on the ventilator    Subjective     The patient remained afebrile during the last 24 hours.  The patient remained intubated on the ventilator and currently on 70% FiO2.  She is on a low-dose of fentanyl.  She is unresponsive.  She is requiring no vasopressors    Review of Systems:   Review of Systems   Unable to perform ROS: Intubated        Objective     Vital Signs  Temp:  [96.4 °F (35.8 °C)-99.3 °F (37.4 °C)] 98.1 °F (36.7 °C)  Heart Rate:  [] 98  Resp:  [28-32] 32  BP: (125-186)/(57-95) 149/88  FiO2 (%):  [55 %-70 %] 70 %    Physical Exam:  Physical Exam  Constitutional:       Appearance: Normal appearance. She is normal weight.   HENT:      Head: Normocephalic and atraumatic.   Eyes:      Pupils: Pupils are equal, round, and reactive to light.   Neck:      Musculoskeletal: Neck supple.   Pulmonary:      Breath sounds: Rales present.   Abdominal:      General: Abdomen is flat. Bowel sounds are normal.      Palpations: Abdomen is soft.   Musculoskeletal:      Right lower leg: Edema present.      Left lower leg: Edema present.   Neurological:      Comments: Unresponsive          Results Review:    I have reviewed all clinical data, test, lab, and imaging results.     Radiology  Xr Chest 1 View    Result Date: 2/5/2021  XR CHEST 1 VW-  Date of Exam: 2/5/2021 6:16 AM  Indication: Intubated Patient.  Comparison: 2/4/2021  Technique: A single view of the chest was obtained.  FINDINGS:   Endotracheal tube and nasogastric tube remain in place unchanged in position.  Cardiac leads stable.  Pulmonary vessels are remain indistinct compatible with pulmonary vascular congestion.  There is hazy bilateral airspace disease which  allowing for differences in technique is not significantly changed from prior study.  No new or worsening airspace consolidation.  No evidence of pneumothorax or pleural effusion.          1.  No interval tube or line change. 2.  No significant change in hazy bilateral airspace disease.   Electronically Signed By-Yakov Yang MD On:2/5/2021 7:14 AM This report was finalized on [id number] by  Yakov Yang MD.    Us Renal Bilateral    Result Date: 2/4/2021  Examination: US RENAL BILATERAL-  Date of Exam: 2/4/2021 9:14 PM  Indication: AMBER.  Comparison: None available.  Technique: Grayscale and color Doppler ultrasound evaluation of the kidneys and urinary bladder was performed  Findings: The right kidney measures 12.5 x 5 x 4.8 cm and the left kidney measures 2.2 x 6.4 x 5.4  cm. There is mild increased cortical echogenicity with prominence of the renal pyramids consistent with medical renal disease. There is no solid kidney mass.  No echogenic shadowing stone.  No hydronephrosis.   The bladder is decompressed by Schneider catheter      Increased renal cortical echogenicity consistent with medical renal disease.  Electronically Signed By-Scooter Nation MD On:2/4/2021 10:24 PM This report was finalized on [id number] by  Scooter Nation MD.      Cardiology    Laboratory    Results from last 7 days   Lab Units 02/05/21  0807 02/04/21  0106 02/03/21  1800   WBC 10*3/mm3 12.50* 11.30* 13.70*   HEMOGLOBIN g/dL 12.6 13.9 14.3   HEMATOCRIT % 37.5 42.2 44.2   PLATELETS 10*3/mm3 92* 131* 113*     Results from last 7 days   Lab Units 02/05/21  0807 02/05/21  0114 02/04/21  1826 02/04/21  1351 02/04/21  0228 02/03/21  1800   SODIUM mmol/L 136 138 136 138 142 143   POTASSIUM mmol/L 3.0* 3.5 3.4* 4.0 3.8 3.3*   CHLORIDE mmol/L 96* 102 101 104 106 107   CO2 mmol/L 21.0* 18.0* 18.0* 15.0* 19.0* 16.0*   BUN mg/dL 67* 59* 55* 51* 40* 31*   CREATININE mg/dL 3.99* 4.20* 3.77* 3.43* 2.80* 2.13*   GLUCOSE mg/dL 375* 307* 339* 314* 212*  251*   ALBUMIN g/dL 2.30*  --   --   --  2.80* 2.80*   BILIRUBIN mg/dL 0.5  --   --   --  1.1 1.1   ALK PHOS U/L 141*  --   --   --  144* 136*   AST (SGOT) U/L 415*  --   --   --  2,875* 3,783*   ALT (SGPT) U/L 549*  --   --   --  976* 1,169*   CALCIUM mg/dL 8.6 8.6 8.7 8.4* 8.5* 8.5*     Results from last 7 days   Lab Units 02/05/21  0807   CK TOTAL U/L 877*             Microbiology   Microbiology Results (last 10 days)     Procedure Component Value - Date/Time    Respiratory Culture - Sputum, ET Suction [[id number]] Collected: 02/04/21 0840    Lab Status: Preliminary result Specimen: Sputum from ET Suction Updated: 02/05/21 1024     Respiratory Culture Scant growth (1+) Normal Respiratory Faye: NO S.aureus/MRSA or Pseudomonas aeruginosa     Gram Stain Few (2+) WBCs per low power field      Rare (1+) Yeast    Legionella Antigen, Urine - Urine, Urine, Clean Catch [[id number]]  (Normal) Collected: 02/04/21 0051    Lab Status: Final result Specimen: Urine, Clean Catch Updated: 02/04/21 0138     LEGIONELLA ANTIGEN, URINE Negative    S. Pneumo Ag Urine or CSF - Urine, Urine, Clean Catch [[id number]]  (Normal) Collected: 02/04/21 0051    Lab Status: Final result Specimen: Urine, Clean Catch Updated: 02/04/21 0138     Strep Pneumo Ag Negative    Urine Culture - Urine, Urine, Clean Catch [[id number]]  (Normal) Collected: 02/04/21 0051    Lab Status: Final result Specimen: Urine, Clean Catch Updated: 02/05/21 1159     Urine Culture No growth    Blood Culture - Blood, Hand, Left [[id number]] Collected: 02/03/21 1800    Lab Status: Preliminary result Specimen: Blood from Hand, Left Updated: 02/04/21 1815     Blood Culture No growth at 24 hours    Blood Culture - Blood, Arm, Right [[id number]] Collected: 02/03/21 1800    Lab Status: Preliminary result Specimen: Blood from Arm, Right Updated: 02/04/21 1815     Blood Culture No growth at 24 hours    MRSA Screen, PCR (Inpatient) - Swab, Nares [[id number]]  (Normal) Collected: 02/03/21  1725    Lab Status: Final result Specimen: Swab from Nares Updated: 02/03/21 2032     MRSA PCR No MRSA Detected          Medication Review:       Schedule Meds  Acetylcysteine, 600 mg, Per G Tube, BID  albuterol sulfate HFA, 6 puff, Inhalation, Q4H - RT  ascorbic acid, 500 mg, Oral, Daily  aspirin, 324 mg, Per G Tube, Daily  budesonide-formoterol, 2 puff, Inhalation, BID - RT  chlorhexidine, 15 mL, Mouth/Throat, Q12H  cholecalciferol, 3,000 Units, Oral, Daily  clopidogrel, 75 mg, Oral, Daily  dexamethasone, 6 mg, Intravenous, Q12H  guaiFENesin, 400 mg, Oral, Q6H  heparin (porcine), 5,000 Units, Subcutaneous, Q12H  insulin glargine, 15 Units, Subcutaneous, Q12H  insulin lispro, 0-24 Units, Subcutaneous, Q6H  metoclopramide, 5 mg, Intravenous, Q8H  pantoprazole, 40 mg, Intravenous, Q12H  piperacillin-tazobactam, 3.375 g, Intravenous, Q12H  potassium chloride, 10 mEq, Intravenous, Q1H  sodium bicarbonate, 650 mg, Nasogastric, TID  sodium chloride, 10 mL, Intravenous, Q12H  zinc sulfate, 220 mg, Oral, Daily        Infusion Meds  fentanyl 10 mcg/mL,  mcg/hr, Last Rate: 250 mcg/hr (02/05/21 1014)  Pharmacy to Dose Zosyn,   sodium chloride, 100 mL/hr, Last Rate: 100 mL/hr (02/05/21 1126)        PRN Meds  •  acetaminophen **OR** acetaminophen  •  aluminum-magnesium hydroxide-simethicone  •  dextrose  •  dextrose  •  glucagon (human recombinant)  •  insulin lispro **AND** insulin lispro  •  ipratropium-albuterol  •  midazolam  •  nitroglycerin  •  ondansetron **OR** ondansetron  •  Pharmacy to Dose Zosyn  •  sodium chloride        Assessment/Plan       Antimicrobial Therapy   1.  Zosyn     day  2.      Day  3.      Day  4.      Day  5.      Day    Assessment     S/p cardiac arrest prior to hospital admission.  Patient was resuscitated successfully     Respiratory failure probably secondary to aspiration pneumonia and COVID-19 infection     COVID-19 infection diagnosed on February 3, 2021     Shock liver.  Transaminase  has improved today     Acute kidney failure     Patient has history of lung cancer and neuroendocrine malignancy     Plan     The patient is not a candidate for remdesivir secondary to shock liver and acute kidney failure  Prognosis is very poor  Continue supportive care, patient is currently DNR  Continue IV Zosyn  3.375 g every 12 hours  Continue enhanced isolation for COVID-19 infection  Labs in a.m. including : CBC with differential, CMP,  d-dimer, ferritin and CRP     Appropriate PPE was placed on before entering the room          Harpal Luke MD  02/05/21  13:13 EST      Note is dictated utilizing voice recognition software/Dragon

## 2021-02-05 NOTE — PLAN OF CARE
Goal Outcome Evaluation:  Plan of Care Reviewed With: family     Outcome Summary: Patient continues to be intubated/sedated, requiring increased sedation (fentanyl gtt and versed push) due to vent dyssynchrony. Urine output continues to be minimal; MD aware. Upon neurologic assessment, there is no withdrawl to pain, and pupils are non-responsive. Discussed goals of care and plan with family; pending decision for comfort measures.

## 2021-02-05 NOTE — PROGRESS NOTES
"Nutrition Services    Patient Name: Christiane Heck  YOB: 1950  MRN: [id number]  Admission date: 2/3/2021      PPE Documentation        PPE Worn By Provider RD did not enter room for this encounter   PPE Worn By Patient  -     PROGRESS NOTE      Encounter Information: Tube feed check on. Per discussion in rounds, TF currently paused 2/2 gastric residuals >500 mL. Per discussion in rounds, plan to resume at 20 mL/hr and reglan added. Hyperglycemia may be contributing to delayed gastric emptying, insulin regimen adjusted       Labs (reviewed below): -Hypokalemia  -Elevated BUN/Cr  -Elevated LFTs  -Hyperglycemia (formula is appropriate for hyperglycemia)       GI Function:  -Residuals >500 mL  -BM documented 2/4       Nutrition Intervention: Resume TF at trickle rate       PO Diet/Supplements: NPO Diet   EN Prescription: Impact Peptide 1.5 at 30 mL/hr + 10 mL H2O flush q2hrs       Intake/Output:   Intake/Output Summary (Last 24 hours) at 2/5/2021 0950  Last data filed at 2/5/2021 0600  Gross per 24 hour   Intake 2569 ml   Output 120 ml   Net 2449 ml            Height: Height: 154.9 cm (61\")   Weight: Weight: 96.6 kg (213 lb) (02/04/21 1447)   BMI: Body mass index is 40.25 kg/m².     Results from last 7 days   Lab Units 02/05/21  0807 02/05/21  0114 02/04/21  1826  02/04/21  0228 02/03/21  1800   SODIUM mmol/L 136 138 136   < > 142 143   POTASSIUM mmol/L 3.0* 3.5 3.4*   < > 3.8 3.3*   CHLORIDE mmol/L 96* 102 101   < > 106 107   CO2 mmol/L 21.0* 18.0* 18.0*   < > 19.0* 16.0*   BUN mg/dL 67* 59* 55*   < > 40* 31*   CREATININE mg/dL 3.99* 4.20* 3.77*   < > 2.80* 2.13*   CALCIUM mg/dL 8.6 8.6 8.7   < > 8.5* 8.5*   BILIRUBIN mg/dL 0.5  --   --   --  1.1 1.1   ALK PHOS U/L 141*  --   --   --  144* 136*   ALT (SGPT) U/L 549*  --   --   --  976* 1,169*   AST (SGOT) U/L 415*  --   --   --  2,875* 3,783*   GLUCOSE mg/dL 375* 307* 339*   < > 212* 251*    < > = values in this interval not displayed.     Results from " last 7 days   Lab Units 02/05/21  0807 02/04/21  0228  02/03/21  1800   MAGNESIUM mg/dL 2.0 2.0  --  1.8   PHOSPHORUS mg/dL 2.9 4.9*  --  4.3   HEMOGLOBIN g/dL 12.6  --    < > 14.3   HEMATOCRIT % 37.5  --    < > 44.2   TRIGLYCERIDES mg/dL  --  130  --   --     < > = values in this interval not displayed.     SARS-CoV-2 MANNY   Date Value Ref Range Status   11/10/2020 Not Detected Not Detected Final     No results found for: HGBA1C    Vitals:    02/05/21 0800   BP:    Pulse:    Resp:    Temp: 96.9 °F (36.1 °C)   SpO2:        RD to follow up per protocol.    Electronically signed by:  Dariana Pedro RD  02/05/21 09:50 EST

## 2021-02-05 NOTE — PROGRESS NOTES
Cardiology Progress  Note      Patient Care Team:  Gloria Barron APRN as PCP - General (Nurse Practitioner)  Ivis Manrique MD as Referring Physician (Internal Medicine)  Niko Vazquez MD as Consulting Physician (Cardiology)    PATIENT IDENTIFICATION  Name: Christiane Hekc  Age: 71 y.o.  Sex: female  :  1950  MRN: [id number]           Cardiology assessment and plan        Respiratory failure  Cardiopulmonary arrest  Prior history of known coronary disease  Nonspecific elevation of troponinlikely demand ischemia/type II myocardial infarction  Active COVID-19 infection   Elevated CPK  Elevated D-dimer and ferritin  Elevated proBNP  Acute renal failure  Severe lactic acidosis  Severe metabolic acidosis  Shock liver  Acute kidney injury  Hypoxic anoxic encephalopathy    Normal LV systolic function  Twelve-lead EKG shows normal sinus rhythm nonspecific ST changes and occasional PVCs  Further recommendations based on patient course  Continue supportive care from cardiac standpoint at this point          Interpretation Summary    · Left ventricular wall thickness is consistent with mild concentric hypertrophy.  · Estimated left ventricular EF = 55% Left ventricular systolic function is normal.  · The right ventricular cavity is moderate to severely dilated.  · Moderately reduced right ventricular systolic function noted.  · The right atrial cavity is mildly dilated.            REASON FOR FOLLOW-UP:  Cardiopulmonary arrest  Elevated proBNP          SUBJECTIVE    Unable to obtain subjective data as patient is in COVID-19 isolation, intubated and sedated.    REVIEW OF SYSTEMS:  Review of systems could not be obtained due to   patient intubated.    OBJECTIVE   Patient remains intubated, sedated.  Rhythm sinus at 97.  Drips: Potassium replacement, bicarb    ASSESSMENT/PLAN  Respiratory failure  Cardiopulmonary arrest  Prior history of known coronary disease  Nonspecific elevation of  "troponinlikely demand ischemia/type II myocardial infarction  Active COVID-19 infection   Elevated CPK  Elevated D-dimer and ferritin  Elevated proBNP  Acute renal failure  Severe lactic acidosis  Severe metabolic acidosis  Shock liver  Acute kidney injury    Recommendations:  Vasopressor support as needed  Rhythm currently sinus.  Ventilator per pulmonary/intensivist  Patient had recent heart catheterization with patent RCA stent, moderate LAD stent-plan to treat medically  Patient has normal LV systolic function  Monitor and replete electrolytes per protocol  Monitor rhythm closely  Aspirin, Plavix  Patient receiving subcu heparin        Vital Signs  Visit Vitals  /88   Pulse 98   Temp 96.9 °F (36.1 °C) (Oral)   Resp (!) 32   Ht 154.9 cm (61\")   Wt 96.6 kg (213 lb)   LMP  (LMP Unknown)   SpO2 94%   BMI 40.25 kg/m²     Oxygen Therapy  SpO2: 94 %  Pulse Oximetry Type: Continuous  Device (Oxygen Therapy): ventilator  Oxygen Concentration (%): 70  Flowsheet Rows      First Filed Value   Admission Height  154.9 cm (61\") Documented at 02/03/2021 1840   Admission Weight  87 kg (191 lb 12.8 oz) Documented at 02/03/2021 1633        Intake & Output (last 3 days)       02/02 0701 - 02/03 0700 02/03 0701 - 02/04 0700 02/04 0701 - 02/05 0700 02/05 0701 - 02/06 0700    I.V. (mL/kg)   2358 (24.4) 2772.3 (28.7)    Other    100    NG/GT  60 211     IV Piggyback    100    Total Intake(mL/kg)  60 (0.6) 2569 (26.6) 2972.3 (30.8)    Urine (mL/kg/hr)  20 120 (0.1) 50 (0.1)    Total Output  20 120 50    Net  +40 +2449 +2922.3                Lines, Drains & Airways    Active LDAs     Name:   Placement date:   Placement time:   Site:   Days:    Peripheral IV 02/03/21 1600 Left Antecubital   02/03/21    1600    Antecubital   1    Peripheral IV 02/04/21 0049 Left Wrist   02/04/21    0049    Wrist   1    Peripheral IV 02/04/21 0000 Distal;Posterior;Right Forearm   02/04/21    0000    Forearm   1    NG/OG Tube Nasogastric Left nostril   " "02/03/21    1728    Left nostril   1    Urethral Catheter   02/03/21    1600     1    Hi-Lo Evac ETT 7.5   02/03/21    1600    Oral   1                       /88   Pulse 98   Temp 96.9 °F (36.1 °C) (Oral)   Resp (!) 32   Ht 154.9 cm (61\")   Wt 96.6 kg (213 lb)   LMP  (LMP Unknown)   SpO2 94%   BMI 40.25 kg/m²   Intake/Output last 3 shifts:  I/O last 3 completed shifts:  In: 2629 [I.V.:2358; NG/GT:271]  Out: 140 [Urine:140]  Intake/Output this shift:  I/O this shift:  In: 2972.3 [I.V.:2772.3; Other:100; IV Piggyback:100]  Out: 50 [Urine:50]    PHYSICAL EXAM:  No physical exam was performed secondary to COVID-19 isolation, limiting exposure.  Attending physical exam noted.  Pupils fixed and dilated, nonreactive to light.  Coarse breath sounds, does not withdraw to pain, GCS 3      Scheduled Meds:      Acetylcysteine, 600 mg, Per G Tube, BID  albuterol sulfate HFA, 6 puff, Inhalation, Q4H - RT  ascorbic acid, 500 mg, Oral, Daily  aspirin, 324 mg, Per G Tube, Daily  budesonide-formoterol, 2 puff, Inhalation, BID - RT  chlorhexidine, 15 mL, Mouth/Throat, Q12H  cholecalciferol, 3,000 Units, Oral, Daily  clopidogrel, 75 mg, Oral, Daily  dexamethasone, 6 mg, Intravenous, Q12H  guaiFENesin, 400 mg, Oral, Q6H  heparin (porcine), 5,000 Units, Subcutaneous, Q12H  insulin glargine, 15 Units, Subcutaneous, Q12H  insulin lispro, 0-24 Units, Subcutaneous, Q6H  metoclopramide, 5 mg, Intravenous, Q8H  pantoprazole, 40 mg, Intravenous, Q12H  piperacillin-tazobactam, 3.375 g, Intravenous, Q12H  potassium chloride, 10 mEq, Intravenous, Q1H  sodium bicarbonate, 650 mg, Nasogastric, TID  sodium chloride, 10 mL, Intravenous, Q12H  zinc sulfate, 220 mg, Oral, Daily        Continuous Infusions:    fentanyl 10 mcg/mL,  mcg/hr, Last Rate: 250 mcg/hr (02/05/21 1014)  Pharmacy to Dose Zosyn,   sodium chloride, 100 mL/hr, Last Rate: 100 mL/hr (02/05/21 1126)        PRN Meds:    •  acetaminophen **OR** acetaminophen  •  " aluminum-magnesium hydroxide-simethicone  •  dextrose  •  dextrose  •  glucagon (human recombinant)  •  insulin lispro **AND** insulin lispro  •  ipratropium-albuterol  •  midazolam  •  nitroglycerin  •  ondansetron **OR** ondansetron  •  Pharmacy to Dose Zosyn  •  sodium chloride        Results Review:     I reviewed the patient's new clinical results.    CBC    Results from last 7 days   Lab Units 02/05/21  0807 02/04/21  0106 02/03/21  1800   WBC 10*3/mm3 12.50* 11.30* 13.70*   HEMOGLOBIN g/dL 12.6 13.9 14.3   PLATELETS 10*3/mm3 92* 131* 113*     Cr Clearance Estimated Creatinine Clearance: 13.7 mL/min (A) (by C-G formula based on SCr of 3.99 mg/dL (H)).  Coag   Results from last 7 days   Lab Units 02/04/21  0228 02/03/21  1800   INR  1.23* 1.29*   APTT seconds  --  23.2*     HbA1C No results found for: HGBA1C  Blood Glucose   Glucose   Date/Time Value Ref Range Status   02/05/2021 1106 283 (H) 70 - 105 mg/dL Final     Comment:     Serial Number: [id number]:  [id number]   02/05/2021 0504 292 (H) 70 - 105 mg/dL Final     Comment:     Serial Number: [id number]:  [id number]   02/04/2021 2027 321 (H) 70 - 105 mg/dL Final     Comment:     Serial Number: [id number]:  [id number]   02/04/2021 1646 301 (H) 70 - 105 mg/dL Final     Comment:     Serial Number: [id number]:  [id number]   02/04/2021 1120 304 (H) 70 - 105 mg/dL Final     Comment:     Serial Number: [id number]:  [id number]   02/04/2021 0537 215 (H) 70 - 105 mg/dL Final     Comment:     Serial Number: [id number]:  [id number]   02/04/2021 0121 192 (H) 70 - 105 mg/dL Final     Comment:     Serial Number: [id number]:  [id number]   02/03/2021 1742 217 (H) 70 - 105 mg/dL Final     Comment:     Serial Number: [id number]:  [id number]     Infection   Results from last 7 days   Lab Units 02/04/21  0840 02/04/21  0051 02/03/21  1800   BLOODCX   --   --  No growth at 24 hours  No growth at 24 hours   RESPCX  Scant growth (1+)  Normal Respiratory Faye: NO S.aureus/MRSA or Pseudomonas aeruginosa  --   --    URINECX   --  No growth  --    PROCALCITONIN ng/mL  --   --  14.49*     CMP   Results from last 7 days   Lab Units 02/05/21  0807 02/05/21  0114 02/04/21  1826 02/04/21  1351 02/04/21  0228 02/03/21  1800   SODIUM mmol/L 136 138 136 138 142 143   POTASSIUM mmol/L 3.0* 3.5 3.4* 4.0 3.8 3.3*   CHLORIDE mmol/L 96* 102 101 104 106 107   CO2 mmol/L 21.0* 18.0* 18.0* 15.0* 19.0* 16.0*   BUN mg/dL 67* 59* 55* 51* 40* 31*   CREATININE mg/dL 3.99* 4.20* 3.77* 3.43* 2.80* 2.13*   GLUCOSE mg/dL 375* 307* 339* 314* 212* 251*   ALBUMIN g/dL 2.30*  --   --   --  2.80* 2.80*   BILIRUBIN mg/dL 0.5  --   --   --  1.1 1.1   ALK PHOS U/L 141*  --   --   --  144* 136*   AST (SGOT) U/L 415*  --   --   --  2,875* 3,783*   ALT (SGPT) U/L 549*  --   --   --  976* 1,169*     ABG    Results from last 7 days   Lab Units 02/05/21  0447 02/04/21  0645 02/03/21  2228 02/03/21  1706   PH, ARTERIAL pH units 7.416 7.294* 7.285* 7.231*   PCO2, ARTERIAL mm Hg 29.7* 35.8 35.8 45.5   PO2 ART mm Hg 59.9* 91.0 67.3* 52.4*   O2 SATURATION ART % 91.4* 96.1 90.9* 80.0*   BASE EXCESS ART mmol/L -4.4* -8.3* -8.8* -8.4*     UA    Results from last 7 days   Lab Units 02/04/21 2025 02/04/21  0051   NITRITE UA  Negative Negative   WBC UA /HPF 6-12* 21-30*   BACTERIA UA /HPF None Seen 3+*   SQUAM EPITHEL UA /HPF 3-6* 0-2   URINECX   --  No growth     SUNSHINE  No results found for: POCMETH, POCAMPHET, POCBARBITUR, POCBENZO, POCCOCAINE, POCOPIATES, POCOXYCODO, POCPHENCYC, POCPROPOXY, POCTHC, POCTRICYC  Lysis Labs   Results from last 7 days   Lab Units 02/05/21  0807 02/05/21  0114 02/04/21  1826 02/04/21  1351 02/04/21  0228 02/04/21  0106 02/03/21  1800   INR   --   --   --   --  1.23*  --  1.29*   APTT seconds  --   --   --   --   --   --  23.2*   FIBRINOGEN mg/dL  --   --   --   --  599*  --   --    HEMOGLOBIN g/dL 12.6  --   --   --   --  13.9 14.3   PLATELETS 10*3/mm3 92*  --   --    --   --  131* 113*   CREATININE mg/dL 3.99* 4.20* 3.77* 3.43* 2.80*  --  2.13*     Radiology(recent) Xr Chest 1 View    Result Date: 2/5/2021    1.  No interval tube or line change. 2.  No significant change in hazy bilateral airspace disease.   Electronically Signed By-Yakov Yang MD On:2/5/2021 7:14 AM This report was finalized on [id number] by  Yakov Yang MD.    Xr Chest 1 View    Result Date: 2/4/2021  1. Placement of an esophagogastric tube is below the diaphragm. 2. Stable endotracheal tube. 3. No change in multifocal airspace disease throughout the lungs with small left pleural effusion.  Electronically Signed By-Demario Simmons MD On:2/4/2021 7:43 AM This report was finalized on [id number] by  Demario Simmons MD.    Xr Chest 1 View    Result Date: 2/3/2021  Diffuse bilateral alveolar airspace disease consistent with multifocal pneumonia. ET tube appears properly positioned.  Electronically Signed By-Scooter Nation MD On:2/3/2021 5:22 PM This report was finalized on [id number] by  Scooter Nation MD.    Us Renal Bilateral    Result Date: 2/4/2021  Increased renal cortical echogenicity consistent with medical renal disease.  Electronically Signed By-Scooter Nation MD On:2/4/2021 10:24 PM This report was finalized on [id number] by  Scooter Nation MD.    Xr Abdomen Kub    Result Date: 2/3/2021  Nasogastric tube is in the stomach.  Electronically Signed By-Scooter Nation MD On:2/3/2021 6:30 PM This report was finalized on [id number] by  Scooter Nation MD.        Results from last 7 days   Lab Units 02/05/21  0807 02/04/21  1351   CK TOTAL U/L 877*  --    TROPONIN T ng/mL  --  0.419*       Xrays, labs reviewed personally by physician.    ECG/EMG Results (most recent)     Procedure Component Value Units Date/Time    Adult Transthoracic Echo Limited W/ Cont if Necessary Per Protocol [[id number]] Collected: 02/04/21 1452     Updated: 02/04/21 2053     Echo EF Estimated 55 %     Narrative:      · Left  ventricular wall thickness is consistent with mild concentric   hypertrophy.  · Estimated left ventricular EF = 55% Left ventricular systolic function   is normal.  · The right ventricular cavity is moderate to severely dilated.  · Moderately reduced right ventricular systolic function noted.  · The right atrial cavity is mildly dilated.       ECG 12 Lead [[id number]] Collected: 02/04/21 0850     Updated: 02/05/21 0754     QT Interval 367 ms     Narrative:      HEART RATE= 116  bpm  RR Interval= 520  ms  DC Interval= 124  ms  P Horizontal Axis= -21  deg  P Front Axis= 60  deg  QRSD Interval= 81  ms  QT Interval= 367  ms  QRS Axis= 45  deg  T Wave Axis= -11  deg  - ABNORMAL ECG -  Sinus tachycardia  Multiform ventricular premature complexes  Low voltage, precordial leads  Prolonged QT interval  No previous ECG available for comparison  Electronically Signed By: Brynn Gutierrez ([facility]) 05-Feb-2021 07:50:11  Date and Time of Study: 2021-02-04 08:50:52            Medication Review:   I have reviewed the patient's current medication list  Scheduled Meds:Acetylcysteine, 600 mg, Per G Tube, BID  albuterol sulfate HFA, 6 puff, Inhalation, Q4H - RT  ascorbic acid, 500 mg, Oral, Daily  aspirin, 324 mg, Per G Tube, Daily  budesonide-formoterol, 2 puff, Inhalation, BID - RT  chlorhexidine, 15 mL, Mouth/Throat, Q12H  cholecalciferol, 3,000 Units, Oral, Daily  clopidogrel, 75 mg, Oral, Daily  dexamethasone, 6 mg, Intravenous, Q12H  guaiFENesin, 400 mg, Oral, Q6H  heparin (porcine), 5,000 Units, Subcutaneous, Q12H  insulin glargine, 15 Units, Subcutaneous, Q12H  insulin lispro, 0-24 Units, Subcutaneous, Q6H  metoclopramide, 5 mg, Intravenous, Q8H  pantoprazole, 40 mg, Intravenous, Q12H  piperacillin-tazobactam, 3.375 g, Intravenous, Q12H  potassium chloride, 10 mEq, Intravenous, Q1H  sodium bicarbonate, 650 mg, Nasogastric, TID  sodium chloride, 10 mL, Intravenous, Q12H  zinc sulfate, 220 mg, Oral, Daily      Continuous  Infusions:fentanyl 10 mcg/mL,  mcg/hr, Last Rate: 250 mcg/hr (02/05/21 1014)  Pharmacy to Dose Zosyn,   sodium chloride, 100 mL/hr, Last Rate: 100 mL/hr (02/05/21 1126)      PRN Meds:.•  acetaminophen **OR** acetaminophen  •  aluminum-magnesium hydroxide-simethicone  •  dextrose  •  dextrose  •  glucagon (human recombinant)  •  insulin lispro **AND** insulin lispro  •  ipratropium-albuterol  •  midazolam  •  nitroglycerin  •  ondansetron **OR** ondansetron  •  Pharmacy to Dose Zosyn  •  sodium chloride    Imaging:  Imaging Results (Last 72 Hours)     Procedure Component Value Units Date/Time    XR Chest 1 View [[id number]] Collected: 02/05/21 0713     Updated: 02/05/21 0716    Narrative:      XR CHEST 1 VW-     Date of Exam: 2/5/2021 6:16 AM     Indication: Intubated Patient.     Comparison: 2/4/2021     Technique: A single view of the chest was obtained.     FINDINGS:      Endotracheal tube and nasogastric tube remain in place unchanged in  position.  Cardiac leads stable.  Pulmonary vessels are remain  indistinct compatible with pulmonary vascular congestion.  There is hazy  bilateral airspace disease which allowing for differences in technique  is not significantly changed from prior study.  No new or worsening  airspace consolidation.  No evidence of pneumothorax or pleural  effusion.             Impression:            1.  No interval tube or line change.  2.  No significant change in hazy bilateral airspace disease.        Electronically Signed By-Yakov Yang MD On:2/5/2021 7:14 AM  This report was finalized on [id number] by  Yakov Yang MD.    US Renal Bilateral [[id number]] Collected: 02/04/21 2223     Updated: 02/04/21 2227    Narrative:      Examination: US RENAL BILATERAL-     Date of Exam: 2/4/2021 9:14 PM     Indication: AMBER.     Comparison: None available.     Technique: Grayscale and color Doppler ultrasound evaluation of the  kidneys and urinary bladder was performed     Findings:  The  right kidney measures 12.5 x 5 x 4.8 cm and the left kidney measures  2.2 x 6.4 x 5.4  cm. There is mild increased cortical echogenicity with  prominence of the renal pyramids consistent with medical renal disease.  There is no solid kidney mass.  No echogenic shadowing stone.  No  hydronephrosis.        The bladder is decompressed by Schneider catheter       Impression:      Increased renal cortical echogenicity consistent with medical renal  disease.     Electronically Signed By-Scooter Nation MD On:2/4/2021 10:24 PM  This report was finalized on [id number] by  Scooter Nation MD.    XR Chest 1 View [[id number]] Collected: 02/04/21 0742     Updated: 02/04/21 0745    Narrative:         DATE OF EXAM:   2/4/2021 5:40 AM     PROCEDURE:   XR CHEST 1 VW-     INDICATIONS:   Intubated Patient     COMPARISON:  2/3/2021     TECHNIQUE:   Portable chest radiograph.     FINDINGS:    Placement of an esophagogastric tube courses below the diaphragm.  Endotracheal tube tip 4 cm above the adonay. Stable cardiomegaly.  Multifocal airspace disease throughout the lungs, not significantly  changed. No pneumothorax. Suspected small left pleural effusion.       Impression:      1. Placement of an esophagogastric tube is below the diaphragm.  2. Stable endotracheal tube.  3. No change in multifocal airspace disease throughout the lungs with  small left pleural effusion.     Electronically Signed By-Demario Simmons MD On:2/4/2021 7:43 AM  This report was finalized on [id number] by  Demario Simmons MD.    XR Outside Films [[id number]] Resulted: 02/04/21 0626     Updated: 02/04/21 0626    Narrative:      This procedure was auto-finalized with no dictation required.    XR Outside Films [[id number]] Resulted: 02/04/21 0559     Updated: 02/04/21 0559    Narrative:      This procedure was auto-finalized with no dictation required.    CT Outside Films [[id number]] Resulted: 02/04/21 0558     Updated: 02/04/21 0558    Narrative:      This procedure was  auto-finalized with no dictation required.    CT Outside Films [[id number]] Resulted: 02/04/21 0558     Updated: 02/04/21 0558    Narrative:      This procedure was auto-finalized with no dictation required.    CT Outside Films [[id number]] Resulted: 02/04/21 0557     Updated: 02/04/21 0557    Narrative:      This procedure was auto-finalized with no dictation required.    CT Outside Films [[id number]] Resulted: 02/04/21 0557     Updated: 02/04/21 0557    Narrative:      This procedure was auto-finalized with no dictation required.    XR Abdomen KUB [[id number]] Collected: 02/03/21 1829     Updated: 02/03/21 1837    Narrative:      DATE OF EXAM:  2/3/2021 6:18 PM     PROCEDURE:  XR ABDOMEN KUB-     INDICATIONS:  ng placement     COMPARISON:  No Comparisons Available     TECHNIQUE:   Single radiographic view of the abdomen was obtained.     FINDINGS:  There is a nasogastric tube in the mildly dilated stomach. There are  scattered pockets of large and small bowel gas in a nonspecific but  nonobstructive pattern.       Impression:      Nasogastric tube is in the stomach.     Electronically Signed By-Scooter Nation MD On:2/3/2021 6:30 PM  This report was finalized on [id number] by  Scooter Nation MD.    XR Chest 1 View [[id number]] Collected: 02/03/21 1721     Updated: 02/03/21 1724    Narrative:      Examination: XR CHEST 1 VW-     Date of Exam: 2/3/2021 4:35 PM     Indication: Confirm ET Tube Placement.     Comparison: 11/10/2020     Technique: 1 view of the chest      Findings:  There is an endotracheal tube in the midthoracic trachea. The heart size  and pulmonary vascular markings are normal. There are diffuse bilateral  confluent alveolar airspace opacities consistent with multifocal  pneumonia. There is more dense consolidation in the left lung base.       Impression:      Diffuse bilateral alveolar airspace disease consistent with multifocal  pneumonia. ET tube appears properly positioned.     Electronically Signed  "By-Scooter Nation MD On:2/3/2021 5:22 PM  This report was finalized on [id number] by  Scooter Nation MD.            DWIGHT Greenberg  02/05/21  12:04 EST       EMR Dragon/Transcription:   \"Dictated utilizing Dragon dictation\".     Disclaimer: Please note that areas of this note were completed with computer voice recognition software.  Quite often unanticipated grammatical, syntax, homophones, and other interpretive errors are inadvertently transcribed by the computer software. Please excuse any errors that have escaped final proofreading      Electronically signed by DWIGHT Greenberg, 02/05/21, 12:05 PM EST.    "

## 2021-02-05 NOTE — SIGNIFICANT NOTE
Patient's two sons had previously called and expressed, after hearing from neurologist about poor prognostic picture, had decided to proceed with comfort measures.  Patient daughter, called nursing and stated that she would also like to pursue comfort measures, but she has asked for us to call and let her know that she  naturally, as she does not want her children to know she agreed with withdrawing artificial means of life support.  She again expressed to me that she was ok with proceeding with this.  When speaking with daughter on the phone.  Nursing is also aware.  There also were social service issues, as patient has custody of this daughter's children, that this daughter mentioned to nursing staff.  It was again verified with patient's 2 sons, and all are now in agreement with changing patient's code status to No CPR, Comfort measures.  All medications have been ordered as appropriate with comfort measures protocols.  Dr. Cope is aware and agrees with this plan of care.    Electronically signed by DWIGHT Baez, 21 at 17:58 EST.

## 2021-02-05 NOTE — CONSULTS
Primary Care Provider: Gloria Barron APRN     Consult requested by: Dr Cope  Reason for Consultation: Neurological evaluation/  History taken from: chart family RN    Chief complaint: Patient status post cardiopulmonary arrest     SUBJECTIVE:    History of present illness:   The patient is 71-year-old right-handed, apparently, white female, who was evaluated in room ICU 2312 at [facility].  Source information is mostly the medical records, and my detailed discussion with the patient's son and daughter separately.  I also discussed the case with Johana Cope and Ti    This patient is critically ill, has multiple medical issues.  She is COVID-19 positive.  Has acute respiratory failure and has shock liver, infection, cardiac issues and these are on top of her diagnosis of lung cancer I believe stage IV and also pancreatic cancer.    She was resuscitated but she has minimal responsiveness some faint corneals sluggish pupils and she has cough and gag but beyond that she has not done much.  She is on some sedation because of her respiratory issues because she becomes tachypneic and has tachycardia but the concern is that there may may have been more hypoxic and anoxia    I talked to her daughter separately and then her son and they are leaning towards terminal extubation considering her present comorbidities and also the consequences of her lung cancer and pancreatic cancer    No seizures  Or large strokes        This is 71-year-old white female who was transferred to [facility] on February 3, 2021.  Patient apparently was diagnosed with COVID-19 on arrival to the emergency room.  She collapsed at home and ambulance were called and on her way way to [facility] she had a cardiac arrest and was resuscitated.  The patient was eventually transferred to [facility].  She is currently intubated on the ventilator.  She was found to have shock liver.  The patient has a history  of lung cancer and neuroendocrine cancer.  The patient was diagnosed with acute kidney failure.   F/U:     Cardiopulmonary arrest     Principal Problem:    Cardiopulmonary arrest with successful resuscitation (CMS/HCC)  Active Problems:    Obesity (BMI 30-39.9)    CAD (coronary artery disease)    Chronic congestive heart failure, with preserved ejection fraction    Malignant neoplasm of lung, stage IV    Shock liver    Coagulopathy, secondary to shock liver    Acute kidney injury (CMS/HCC)    Hypokalemia    Hypomagnesemia    Closed fracture of multiple ribs of both sides, secondary to CPR    Closed, minimally displaced, fracture of body of sternum, secondary to CPR    Acute encephalopathy, concern for anoxic injury    Low serum thyroid stimulating hormone (TSH)    Lactic acidosis    Elevated troponin, possible NSTEMI    COVID-19 virus detected    Type 2 diabetes mellitus, without long-term current use of insulin (CMS/HCC)    Septic shock (CMS/HCC)    Acute urinary tract infection    CAP (community acquired pneumonia)    Acute respiratory failure with hypoxia (CMS/HCC)    COPD with acute exacerbation (CMS/HCC)    COPD exacerbation (CMS/HCC)    Melena         Review of Systems   Could not be obtained because of her present condition      PATIENT HISTORY:  Past Medical History:   Diagnosis Date   • Anxiety    • Arteriosclerosis of coronary artery 2/3/2021   • Back pain, chronic 2/3/2021   • CAD (coronary artery disease)    • CHF (congestive heart failure) (CMS/HCC)    • Chronic osteoarthritis 2/3/2021   • Congestive heart failure (CMS/HCC) 2/3/2021   • Depressive disorder 2/3/2021   • Diabetes mellitus (CMS/HCC)    • Former smoker, stopped smoking in distant past    • GERD (gastroesophageal reflux disease)    • HLD (hyperlipidemia)    • Hypertension    • Malignant neoplasm of lung (CMS/HCC) 2/3/2021    pt unaware of this dx   • Muscle ache     Muscle ache/cramps   • Neuroendocrine carcinoma (CMS/HCC)    •  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   • Neuropathy    • PUD (peptic ulcer disease) 2/3/2021   • Spinal stenosis     Chronic back pain   • Stented coronary artery 6/4/2016    nancy to [facility]   • Tobacco abuse 2/3/2021    2-26-18 Pt states quit smoking 50 years ago per MAS/dt   ,   Past Surgical History:   Procedure Laterality Date   • CARDIAC CATHETERIZATION     • CARDIAC CATHETERIZATION N/A 11/12/2020    Procedure: Left Heart Cath;  Surgeon: Brynn Gutierrez MD;  Location:  [facility] CATH INVASIVE LOCATION;  Service: Cardiovascular;  Laterality: N/A;   • CARDIAC CATHETERIZATION N/A 11/12/2020    Procedure: Coronary angiography;  Surgeon: Brynn Gutierrez MD;  Location:  [facility] CATH INVASIVE [facility];  Service: Cardiovascular;  Laterality: N/A;   • CARDIAC CATHETERIZATION N/A 11/12/2020    Procedure: Left ventriculography;  Surgeon: Brynn Gutierrez MD;  Location: [facility] CATH INVASIVE LOCATION;  Service: Cardiovascular;  Laterality: N/A;   • CHOLECYSTECTOMY     • CORONARY STENT PLACEMENT     ,   Family History   Problem Relation Age of Onset   • Lung cancer Mother    • Lung cancer Sister    ,   Social History     Tobacco Use   • Smoking status: Former Smoker   • Smokeless tobacco: Never Used   Substance Use Topics   • Alcohol use: Never     Frequency: Never   • Drug use: Never   ,   Medications Prior to Admission   Medication Sig Dispense Refill Last Dose   • amitriptyline (ELAVIL) 25 MG tablet Take 25 mg by mouth every night at bedtime.      • baclofen (LIORESAL) 20 MG tablet Take 40 mg by mouth Daily.      • budesonide-formoterol (SYMBICORT) 80-4.5 MCG/ACT inhaler Inhale 2 puffs 2 (Two) Times a Day. 6.9 g 12    • clopidogrel (PLAVIX) 75 MG tablet Take 75 mg by mouth Daily.      • gabapentin (NEURONTIN) 300 MG capsule Take 1 capsule by mouth 2 (two) times a day. 60 capsule 2    • glipizide (GLUCOTROL) 5 MG tablet Take 5 mg by mouth Daily.      • isosorbide mononitrate  (IMDUR) 30 MG 24 hr tablet Take 30 mg by mouth Daily.      • JANUMET XR  MG tablet Take 1 tablet by mouth Daily.      • lisinopril-hydrochlorothiazide (PRINZIDE,ZESTORETIC) 10-12.5 MG per tablet Take 1 tablet by mouth Daily.      • meloxicam (MOBIC) 7.5 MG tablet Take 7.5 mg by mouth Daily.      • oxyCODONE-acetaminophen (PERCOCET)  MG per tablet Take 1 tablet by mouth Every 4 (Four) Hours As Needed.      • potassium chloride (K-DUR,KLOR-CON) 20 MEQ CR tablet Take 20 mEq by mouth Daily.      • rosuvastatin (CRESTOR) 10 MG tablet Take 10 mg by mouth Daily.      • sucralfate (CARAFATE) 1 g tablet Take 1 g by mouth 4 (Four) Times a Day.      • TRAZODONE HCL PO Take 100 mg by mouth Every Night.      • VENTOLIN  (90 Base) MCG/ACT inhaler Inhale 2 puffs Every 4 (Four) Hours As Needed.      , Scheduled Meds:  Acetylcysteine, 600 mg, Per G Tube, BID  albuterol sulfate HFA, 6 puff, Inhalation, Q4H - RT  ascorbic acid, 500 mg, Oral, Daily  aspirin, 324 mg, Per G Tube, Daily  budesonide-formoterol, 2 puff, Inhalation, BID - RT  chlorhexidine, 15 mL, Mouth/Throat, Q12H  cholecalciferol, 3,000 Units, Oral, Daily  clopidogrel, 75 mg, Oral, Daily  dexamethasone, 6 mg, Intravenous, Q12H  guaiFENesin, 400 mg, Oral, Q6H  heparin (porcine), 5,000 Units, Subcutaneous, Q12H  insulin glargine, 15 Units, Subcutaneous, Q12H  insulin lispro, 0-24 Units, Subcutaneous, Q6H  metoclopramide, 5 mg, Intravenous, Q8H  pantoprazole, 40 mg, Intravenous, Q12H  piperacillin-tazobactam, 3.375 g, Intravenous, Q12H  potassium chloride, 10 mEq, Intravenous, Q1H  sodium bicarbonate, 650 mg, Nasogastric, TID  sodium chloride, 10 mL, Intravenous, Q12H  zinc sulfate, 220 mg, Oral, Daily    , Continuous Infusions:  fentanyl 10 mcg/mL,  mcg/hr, Last Rate: 250 mcg/hr (02/05/21 1014)  Pharmacy to Dose Zosyn,   sodium chloride, 100 mL/hr, Last Rate: 100 mL/hr (02/05/21 1126)    , PRN Meds:  •  acetaminophen **OR** acetaminophen  •   aluminum-magnesium hydroxide-simethicone  •  dextrose  •  dextrose  •  glucagon (human recombinant)  •  insulin lispro **AND** insulin lispro  •  ipratropium-albuterol  •  midazolam  •  nitroglycerin  •  ondansetron **OR** ondansetron  •  Pharmacy to Dose Zosyn  •  sodium chloride, Allergies:  Ambien [zolpidem]    ________________________________________________________        OBJECTIVE:  Upon today's exam, patient is intubated and sedated      Neurologic Exam  Limited neurological examination because of her present intubation and sedation.    Obviously nonverbal and not following commands    Cranial nerve examination demonstrates sluggish pupil about 2 mm, faint corneals, no forceful eye deviation no ptosis or nystagmus otherwise.  No disconjugate gaze.  Funduscopic damage were not successful.  I do not see any facial asymmetry otherwise.  Hearing is questionable.  Tongue was midline but I could not visualize her oropharynx or uvula.    On motor examination I do not see any responses    On sensory examination I do not see any responses    I could not get any reflexes and toes are mute    Gait and coordination could not be evaluated  ________________________________________________________   RESULTS REVIEW:    VITAL SIGNS:   Temp:  [96.4 °F (35.8 °C)-99.3 °F (37.4 °C)] 98.1 °F (36.7 °C)  Heart Rate:  [] 98  Resp:  [28-32] 32  BP: (125-186)/(57-95) 149/88  FiO2 (%):  [55 %-70 %] 70 %     LABS:  WBC   Date Value Ref Range Status   02/05/2021 12.50 (H) 3.40 - 10.80 10*3/mm3 Final     RBC   Date Value Ref Range Status   02/05/2021 4.59 3.77 - 5.28 10*6/mm3 Final     Hemoglobin   Date Value Ref Range Status   02/05/2021 12.6 12.0 - 15.9 g/dL Final     Hematocrit   Date Value Ref Range Status   02/05/2021 37.5 34.0 - 46.6 % Final     MCV   Date Value Ref Range Status   02/05/2021 81.7 79.0 - 97.0 fL Final     MCH   Date Value Ref Range Status   02/05/2021 27.3 26.6 - 33.0 pg Final     MCHC   Date Value Ref Range  Status   02/05/2021 33.5 31.5 - 35.7 g/dL Final     RDW   Date Value Ref Range Status   02/05/2021 16.8 (H) 12.3 - 15.4 % Final     RDW-SD   Date Value Ref Range Status   02/05/2021 48.6 37.0 - 54.0 fl Final     MPV   Date Value Ref Range Status   02/05/2021 9.4 6.0 - 12.0 fL Final     Platelets   Date Value Ref Range Status   02/05/2021 92 (L) 140 - 450 10*3/mm3 Final     Neutrophil %   Date Value Ref Range Status   02/05/2021 92.8 (H) 42.7 - 76.0 % Final     Lymphocyte %   Date Value Ref Range Status   02/05/2021 3.3 (L) 19.6 - 45.3 % Final     Monocyte %   Date Value Ref Range Status   02/05/2021 3.7 (L) 5.0 - 12.0 % Final     Eosinophil %   Date Value Ref Range Status   02/05/2021 0.1 (L) 0.3 - 6.2 % Final     Basophil %   Date Value Ref Range Status   02/05/2021 0.1 0.0 - 1.5 % Final     Neutrophils, Absolute   Date Value Ref Range Status   02/05/2021 11.60 (H) 1.70 - 7.00 10*3/mm3 Final     Lymphocytes, Absolute   Date Value Ref Range Status   02/05/2021 0.40 (L) 0.70 - 3.10 10*3/mm3 Final     Monocytes, Absolute   Date Value Ref Range Status   02/05/2021 0.50 0.10 - 0.90 10*3/mm3 Final     Eosinophils, Absolute   Date Value Ref Range Status   02/05/2021 0.00 0.00 - 0.40 10*3/mm3 Final     Basophils, Absolute   Date Value Ref Range Status   02/05/2021 0.00 0.00 - 0.20 10*3/mm3 Final     nRBC   Date Value Ref Range Status   02/05/2021 0.2 0.0 - 0.2 /100 WBC Final     Glucose   Date Value Ref Range Status   02/05/2021 375 (H) 65 - 99 mg/dL Final     BUN   Date Value Ref Range Status   02/05/2021 67 (H) 8 - 23 mg/dL Final     Creatinine   Date Value Ref Range Status   02/05/2021 3.99 (H) 0.57 - 1.00 mg/dL Final     Sodium   Date Value Ref Range Status   02/05/2021 136 136 - 145 mmol/L Final     Potassium   Date Value Ref Range Status   02/05/2021 3.0 (L) 3.5 - 5.2 mmol/L Final     Chloride   Date Value Ref Range Status   02/05/2021 96 (L) 98 - 107 mmol/L Final     CO2   Date Value Ref Range Status   02/05/2021  21.0 (L) 22.0 - 29.0 mmol/L Final     Calcium   Date Value Ref Range Status   02/05/2021 8.6 8.6 - 10.5 mg/dL Final     Total Protein   Date Value Ref Range Status   02/05/2021 5.3 (L) 6.0 - 8.5 g/dL Final     Albumin   Date Value Ref Range Status   02/05/2021 2.30 (L) 3.50 - 5.20 g/dL Final     ALT (SGPT)   Date Value Ref Range Status   02/05/2021 549 (H) 1 - 33 U/L Final     AST (SGOT)   Date Value Ref Range Status   02/05/2021 415 (H) 1 - 32 U/L Final     Alkaline Phosphatase   Date Value Ref Range Status   02/05/2021 141 (H) 39 - 117 U/L Final     Total Bilirubin   Date Value Ref Range Status   02/05/2021 0.5 0.0 - 1.2 mg/dL Final     eGFR Non  Amer   Date Value Ref Range Status   02/05/2021 11 (L) >60 mL/min/1.73 Final     Comment:     <15 Indicative of kidney failure.     BUN/Creatinine Ratio   Date Value Ref Range Status   02/05/2021 16.8 7.0 - 25.0 Final     Anion Gap   Date Value Ref Range Status   02/05/2021 19.0 (H) 5.0 - 15.0 mmol/L Final       Lab Results   Component Value Date    TSH 0.021 (L) 02/04/2021    LDL <5 02/04/2021         IMAGING STUDIES:  Xr Chest 1 View    Result Date: 2/5/2021    1.  No interval tube or line change. 2.  No significant change in hazy bilateral airspace disease.   Electronically Signed By-Yakov Yang MD On:2/5/2021 7:14 AM This report was finalized on [id number] by  Yakov Yang MD.    Xr Chest 1 View    Result Date: 2/4/2021  1. Placement of an esophagogastric tube is below the diaphragm. 2. Stable endotracheal tube. 3. No change in multifocal airspace disease throughout the lungs with small left pleural effusion.  Electronically Signed By-Demario Simmons MD On:2/4/2021 7:43 AM This report was finalized on [id number] by  Demario Simmons MD.    Xr Chest 1 View    Result Date: 2/3/2021  Diffuse bilateral alveolar airspace disease consistent with multifocal pneumonia. ET tube appears properly positioned.  Electronically Signed By-Scooter Nation MD On:2/3/2021 5:22  PM This report was finalized on [id number] by  Scooter Nation MD.    Us Renal Bilateral    Result Date: 2/4/2021  Increased renal cortical echogenicity consistent with medical renal disease.  Electronically Signed By-Scooter Nation MD On:2/4/2021 10:24 PM This report was finalized on [id number] by  Scooter Nation MD.    Xr Abdomen Kub    Result Date: 2/3/2021  Nasogastric tube is in the stomach.  Electronically Signed By-Scooter Nation MD On:2/3/2021 6:30 PM This report was finalized on [id number] by  Scooter Nation MD.      I reviewed the patient's new clinical results.      ________________________________________________________     PROBLEM LIST:    Cardiopulmonary arrest with successful resuscitation (CMS/HCC)    Obesity (BMI 30-39.9)    CAD (coronary artery disease)    Chronic congestive heart failure, with preserved ejection fraction    Malignant neoplasm of lung, stage IV    Shock liver    Coagulopathy, secondary to shock liver    Acute kidney injury (CMS/HCC)    Hypokalemia    Hypomagnesemia    Closed fracture of multiple ribs of both sides, secondary to CPR    Closed, minimally displaced, fracture of body of sternum, secondary to CPR    Acute encephalopathy, concern for anoxic injury    Low serum thyroid stimulating hormone (TSH)    Lactic acidosis    Elevated troponin, possible NSTEMI    COVID-19 virus detected    Type 2 diabetes mellitus, without long-term current use of insulin (CMS/HCC)    Septic shock (CMS/HCC)    Acute urinary tract infection    CAP (community acquired pneumonia)    Acute respiratory failure with hypoxia (CMS/HCC)    COPD with acute exacerbation (CMS/HCC)    COPD exacerbation (CMS/HCC)    Melena          Assessment/Plan   ASSESSMENT/PLAN:  Status post hypoxic and anoxia, likely hypoxic or anoxic encephalopathy.  On top of that likely toxic and metabolic encephalopathy    Very poor prognostic picture considering her comorbidities.    Had a very detail discussion with the family and  also have discussed with the team already.  This is a poor prognostic picture for now because of the comorbidities and multisystem involvement and also in the future considering she has to deal with her lung cancer and pancreatic cancer.    Family to decide soon  Supportive care          I discussed the patient's findings and my recommendations with family, nursing staff, primary care team and consulting provider    Aurelio Johnson MD  02/05/21  12:47 EST

## 2021-02-05 NOTE — PROGRESS NOTES
"RENAL/KCC:     LOS: 2 days    Patient Care Team:  Gloria Barron APRN as PCP - General (Nurse Practitioner)  Ivis Manrique MD as Referring Physician (Internal Medicine)  Niko Vazquez MD as Consulting Physician (Cardiology)    Chief Complaint:  AMBER    Subjective     Interval History:   Chart reviewed.  Oliguric.  Seen in the ICU on the vent.  ROS unobtainable.        Objective     Vital Sign Min/Max for last 24 hours  Temp  Min: 96.4 °F (35.8 °C)  Max: 99.3 °F (37.4 °C)   BP  Min: 125/73  Max: 179/95   Pulse  Min: 96  Max: 115   Resp  Min: 28  Max: 30   SpO2  Min: 89 %  Max: 97 %   No data recorded   Weight  Min: 96.6 kg (213 lb)  Max: 96.6 kg (213 lb)     Flowsheet Rows      First Filed Value   Admission Height  154.9 cm (61\") Documented at 02/03/2021 1840   Admission Weight  87 kg (191 lb 12.8 oz) Documented at 02/03/2021 1633          I/O this shift:  In: 2972.3 [I.V.:2772.3; Other:100; IV Piggyback:100]  Out: 50 [Urine:50]  I/O last 3 completed shifts:  In: 2629 [I.V.:2358; NG/GT:271]  Out: 140 [Urine:140]    Physical Exam:  GEN: Intubated, Sedated  ENT: +ETT  NECK: Supple, no JVD  CHEST: Coarse, no W/R/C  CV: RRR, no M/G/R  ABD: Soft, +BS  SKIN: Warm and Dry  NEURO: Sedated      WBC WBC   Date Value Ref Range Status   02/05/2021 12.50 (H) 3.40 - 10.80 10*3/mm3 Final   02/04/2021 11.30 (H) 3.40 - 10.80 10*3/mm3 Final   02/03/2021 13.70 (H) 3.40 - 10.80 10*3/mm3 Final      HGB Hemoglobin   Date Value Ref Range Status   02/05/2021 12.6 12.0 - 15.9 g/dL Final   02/04/2021 13.9 12.0 - 15.9 g/dL Final   02/03/2021 14.3 12.0 - 15.9 g/dL Final      HCT Hematocrit   Date Value Ref Range Status   02/05/2021 37.5 34.0 - 46.6 % Final   02/04/2021 42.2 34.0 - 46.6 % Final   02/03/2021 44.2 34.0 - 46.6 % Final      Platlets No results found for: LABPLAT   MCV MCV   Date Value Ref Range Status   02/05/2021 81.7 79.0 - 97.0 fL Final   02/04/2021 82.8 79.0 - 97.0 fL Final   02/03/2021 84.9 79.0 - 97.0 fL Final "          Sodium Sodium   Date Value Ref Range Status   02/05/2021 136 136 - 145 mmol/L Final   02/05/2021 138 136 - 145 mmol/L Final   02/04/2021 136 136 - 145 mmol/L Final   02/04/2021 138 136 - 145 mmol/L Final   02/04/2021 142 136 - 145 mmol/L Final   02/03/2021 143 136 - 145 mmol/L Final      Potassium Potassium   Date Value Ref Range Status   02/05/2021 3.0 (L) 3.5 - 5.2 mmol/L Final   02/05/2021 3.5 3.5 - 5.2 mmol/L Final   02/04/2021 3.4 (L) 3.5 - 5.2 mmol/L Final     Comment:     Slight hemolysis detected by analyzer. Results may be affected.   02/04/2021 4.0 3.5 - 5.2 mmol/L Final     Comment:     Slight hemolysis detected by analyzer. Results may be affected.   02/04/2021 3.8 3.5 - 5.2 mmol/L Final   02/03/2021 3.3 (L) 3.5 - 5.2 mmol/L Final     Comment:     Slight hemolysis detected by analyzer. Results may be affected.      Chloride Chloride   Date Value Ref Range Status   02/05/2021 96 (L) 98 - 107 mmol/L Final   02/05/2021 102 98 - 107 mmol/L Final   02/04/2021 101 98 - 107 mmol/L Final   02/04/2021 104 98 - 107 mmol/L Final   02/04/2021 106 98 - 107 mmol/L Final   02/03/2021 107 98 - 107 mmol/L Final      CO2 CO2   Date Value Ref Range Status   02/05/2021 21.0 (L) 22.0 - 29.0 mmol/L Final   02/05/2021 18.0 (L) 22.0 - 29.0 mmol/L Final   02/04/2021 18.0 (L) 22.0 - 29.0 mmol/L Final   02/04/2021 15.0 (L) 22.0 - 29.0 mmol/L Final   02/04/2021 19.0 (L) 22.0 - 29.0 mmol/L Final   02/03/2021 16.0 (L) 22.0 - 29.0 mmol/L Final      BUN BUN   Date Value Ref Range Status   02/05/2021 67 (H) 8 - 23 mg/dL Final   02/05/2021 59 (H) 8 - 23 mg/dL Final   02/04/2021 55 (H) 8 - 23 mg/dL Final   02/04/2021 51 (H) 8 - 23 mg/dL Final   02/04/2021 40 (H) 8 - 23 mg/dL Final   02/03/2021 31 (H) 8 - 23 mg/dL Final      Creatinine Creatinine   Date Value Ref Range Status   02/05/2021 3.99 (H) 0.57 - 1.00 mg/dL Final   02/05/2021 4.20 (H) 0.57 - 1.00 mg/dL Final   02/04/2021 3.77 (H) 0.57 - 1.00 mg/dL Final   02/04/2021 3.43  (H) 0.57 - 1.00 mg/dL Final   02/04/2021 2.80 (H) 0.57 - 1.00 mg/dL Final   02/03/2021 2.13 (H) 0.57 - 1.00 mg/dL Final      Calcium Calcium   Date Value Ref Range Status   02/05/2021 8.6 8.6 - 10.5 mg/dL Final   02/05/2021 8.6 8.6 - 10.5 mg/dL Final   02/04/2021 8.7 8.6 - 10.5 mg/dL Final   02/04/2021 8.4 (L) 8.6 - 10.5 mg/dL Final   02/04/2021 8.5 (L) 8.6 - 10.5 mg/dL Final   02/03/2021 8.5 (L) 8.6 - 10.5 mg/dL Final      PO4 No results found for: CAPO4   Albumin Albumin   Date Value Ref Range Status   02/05/2021 2.30 (L) 3.50 - 5.20 g/dL Final   02/04/2021 2.80 (L) 3.50 - 5.20 g/dL Final   02/03/2021 2.80 (L) 3.50 - 5.20 g/dL Final      Magnesium Magnesium   Date Value Ref Range Status   02/05/2021 2.0 1.6 - 2.4 mg/dL Final   02/04/2021 2.0 1.6 - 2.4 mg/dL Final   02/03/2021 1.8 1.6 - 2.4 mg/dL Final      Uric Acid No results found for: URICACID        Results Review:     I reviewed the patient's new clinical results.    Acetylcysteine, 600 mg, Per G Tube, BID  albuterol sulfate HFA, 6 puff, Inhalation, Q4H - RT  ascorbic acid, 500 mg, Oral, Daily  aspirin, 324 mg, Per G Tube, Daily  budesonide-formoterol, 2 puff, Inhalation, BID - RT  chlorhexidine, 15 mL, Mouth/Throat, Q12H  cholecalciferol, 3,000 Units, Oral, Daily  clopidogrel, 75 mg, Oral, Daily  dexamethasone, 6 mg, Intravenous, Q12H  guaiFENesin, 400 mg, Oral, Q6H  heparin (porcine), 5,000 Units, Subcutaneous, Q12H  insulin glargine, 15 Units, Subcutaneous, Q12H  insulin lispro, 0-24 Units, Subcutaneous, Q6H  insulin regular, 10 Units, Intravenous, Once  metoclopramide, 5 mg, Intravenous, Q8H  pantoprazole, 40 mg, Intravenous, Q12H  piperacillin-tazobactam, 3.375 g, Intravenous, Q12H  potassium chloride, 10 mEq, Intravenous, Q1H  sodium bicarbonate, 650 mg, Nasogastric, TID  sodium chloride, 10 mL, Intravenous, Q12H  zinc sulfate, 220 mg, Oral, Daily      fentanyl 10 mcg/mL,  mcg/hr, Last Rate: 250 mcg/hr (02/05/21 1014)  Pharmacy to Dose Zosyn,          Medication Review: Reviewed    Assessment/Plan     AMBER/ATN - s/p arrest + rhabdo  Metabolic acidosis  S/P arrest  Rhabdo  ?UTI    Cardiopulmonary arrest with successful resuscitation (CMS/HCC)    Obesity (BMI 30-39.9)    CAD (coronary artery disease)    Chronic congestive heart failure, with preserved ejection fraction    Malignant neoplasm of lung, stage IV    Shock liver    Coagulopathy, secondary to shock liver    Acute kidney injury (CMS/[facility])    Hypokalemia    Hypomagnesemia    Closed fracture of multiple ribs of both sides, secondary to CPR    Closed, minimally displaced, fracture of body of sternum, secondary to CPR    Acute encephalopathy, concern for anoxic injury    Low serum thyroid stimulating hormone (TSH)    Lactic acidosis    Elevated troponin, possible NSTEMI    COVID-19 virus detected    Type 2 diabetes mellitus, without long-term current use of insulin (CMS/[facility])    Septic shock (CMS/[facility])    Acute urinary tract infection    CAP (community acquired pneumonia)    Acute respiratory failure with hypoxia (CMS/[facility])    COPD with acute exacerbation (CMS/[facility])    COPD exacerbation (CMS/[facility])    Melena      Plan: Seen in the ICU.  Cr has peaked at 4.2 and is better at 3.9.  Remains oliguric.  CK much lower.  Continue IVF for one more liter.  Replace K.  No indication for RRT.  Will follow.      Ori Pineda MD   [facility]  02/05/21  10:26 EST

## 2021-02-05 NOTE — PROGRESS NOTES
PULMONARY/CRITICAL CARE PROGRESS NOTE         Name:  Christiane Heck  Age: 71 y.o.  Sex:  female  :   1950  MRN:  [id number]     ROOM:  [facility] ICU      ASSESSMENT & PLAN     F/U: Cardiopulmonary arrest    Principal Problem:    Cardiopulmonary arrest with successful resuscitation (CMS/HCC)  Active Problems:    Obesity (BMI 30-39.9)    CAD (coronary artery disease)    Chronic congestive heart failure, with preserved ejection fraction    Malignant neoplasm of lung, stage IV    Shock liver    Coagulopathy, secondary to shock liver    Acute kidney injury (CMS/HCC)    Hypokalemia    Hypomagnesemia    Closed fracture of multiple ribs of both sides, secondary to CPR    Closed, minimally displaced, fracture of body of sternum, secondary to CPR    Acute encephalopathy, concern for anoxic injury    Low serum thyroid stimulating hormone (TSH)    Lactic acidosis    Elevated troponin, possible NSTEMI    COVID-19 virus detected    Type 2 diabetes mellitus, without long-term current use of insulin (CMS/[facility])    Septic shock (CMS/[facility])    Acute urinary tract infection    CAP (community acquired pneumonia)    Acute respiratory failure with hypoxia (CMS/[facility])    COPD with acute exacerbation (CMS/[facility])    COPD exacerbation (CMS/[facility])    Melena       Multidisciplinary Rounds:  -Code Status changed to No CPR, Full Interventions overnight  -Palliative Care following.  -Cardiology following.  -NGT, tube feed recommendations per Dietitian  -Nephrology following.      PLAN:  Cardiopulmonary arrest with successful resuscitation  CAD, S/P Cardiac stent  Elevated troponin, possible NSTEMI  -Etiology of cardiac arrest unknown, but suspicious for hypoxia related  -CT PE protocol was obtained and negative  -Monitor serial troponins  -ASA daily, Plavix; unsure if patient is currently taking Plavix, but was recently on Plavix, will resume for now  -On cardiac monitoring  -Cardiology consulted  -TTM considered, but was awaiting CT head and  CT PE protocol, which was to be completed prior to transfer, however, patient was unable to be transferred to this facility until outside the window of current evidence-based recommendations, eliminating patient is a candidate for TTM     Septic shock  Acute urinary tract infection  CAP (community acquired pneumonia)  COVID-19 virus detected  Lactic acidosis  -2 L of IV fluids given at [facility]  -Continue IVFs as ordered  -initially on Samuel-Synephrine at [facility], currently normotensive  -monitor serial lactic acid levels until normalized  -blood cultures-pending  -UA-3+ bacteria at [facility], culture was reflexed, follow [facility] culture results  -COVID-19 positive at [facility], new diagnosis on 2/30/2021  -CRP 18.66, procalcitonin 14.49 on admission  -Rocephin given at [facility], will expand to Zosyn for better coverage of probable pneumonia  -Concern for possible aspiration given patient's prolonged resuscitation  -Sputum culture-pending  -Repeat UA at this facility-pending  -Urine antigens-pending  -Follow Covid 19 progression labs as ordered  -Due to elevated liver function tests, patient not a candidate for Ivermectin or Remdesivir; monitor LFTs to see if improvement will allow for med administration  -Continue Vitamin A, vitamin D, zinc, acetylcysteine, and vitamin C  -Avoiding colchicine due to elevated LFTs.  -Decadron, titrate based on patient's clinical status  -Infectious disease consulted     Acute respiratory failure with hypoxia  COPD with acute exacerbation  -Likely secondary to COVID-19 infection, complicated by hypoxia  -Initial presentation upon EMS arrival, oxygen saturation in the 50s  -Ventilator management, wean FiO2 and PEEP as tolerated  -Monitor ABGs and chest x-rays  -Likely multifactorial, related to cardiopulmonary arrest, COVID-19 infection, and pneumonia  -IV steroids as above  -Albuterol HFA, Symbicort scheduled and as needed  -CT PE  protocol negative at outlying facility  -Ventilator mismatch, abdominal breathing, started on fentanyl for sedation     Shock liver  Coagulopathy, secondary to shock liver  -Etiology likely secondary to cardiopulmonary arrest  -On admission, alkaline phosphatase 136, ALT 1169, AST 3783  -Monitor and trend labs  -Consider GI consultation     Primary malignant neuroendocrine tumor  Secondary malignant neoplasm of lung, stage IV  -Does not appear to have had any further treatment since 4/1/2020, was being seen at [facility]  -Consider hematology/oncology consultation     Peptic ulcer disease  Melena, on admission  -Check Hemoccult stool  -Hemoglobin stable  -Protonix twice daily, for now  -Patient is high risk for VTE given COVID-19 diagnosis, will start patient on heparin subcu every 12 hours  -Monitor patient's hemoglobin, will discontinue antiplatelets and anticoagulation if evidence of bleeding, currently benefit is greater than risk     Acute kidney injury  -BUN 31, creatinine 2.13 on admission  -Monitor and trend labs  -Avoid hypotension, NSAIDs, and nephrotoxic medications  -Nephrology consulted     Hypokalemia  -Replacement ordered  -Monitor and trend labs     Hypomagnesemia  -Replacement ordered  -Monitor and trend labs     Closed fracture of multiple ribs of both sides, secondary to CPR  Closed, minimally displaced, fracture of body of sternum, secondary to CPR  -Findings on outlying facility CT  -Further recommendations as indicated, currently stable     Acute encephalopathy, concern for anoxic injury  -CT of the head was negative for intracranial abnormalities  -Patient continues unresponsive, and was not on sedation prior to arrival to this facility  -Likely very poor prognosis  -Consider neurology consultation     Low serum thyroid stimulating hormone (TSH)  -Will recheck thyroid studies in AM     Type 2 diabetes mellitus, without long-term current use of  insulin  -strict glycemic control recommended  -Accuchecks AC/HS or Q6H with sliding scale insulin, based on diet  -Hold oral diabetic medications until out of ICU.     Palliative care consultation in AM  NG Placed, dietitian consult for tube feed recommendations.     Code Status: CPR, Full Interventions  VTE Prophylaxis: Heparin SC, transition per COVID 19 recommendations  PUD Prophylaxis: Protonix      [place] & SUBJECTIVE     Christiane Heck is a 71 y.o. 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  The following has been culminated from review of outlying facility documentation, as patient presents to this facility intubated, sedated with no family currently present.  Additional past medical history includes stage IV lung cancer.  Patient initially presented to [facility] via EMS with a 1 day history of headache, progressive shortness of breath, coughing, vomiting.  EMS upon their arrival stated that patient's oxygen saturation was 50%.  Patient was immediately started on BiPAP with 100% FiO2 with an improvement in oxygen saturation to the 70s.  At that time, patient was alert and responsive.  In route, patient became bradycardic with IV atropine administered.  Patient then followed with  "unresponsiveness and asystole on monitor.  2 rounds of IV epinephrine was administered with CPR.  Upon arrival to outlying facility, patient continued in asystole, in which CPR continued.  Patient initially had a supraglottic airway upon presentation.  Additional IV epinephrine was administered along with aggressive IV fluid bolusing.  Initial heart rhythm was asystole, and upon pulse check, patient was in PEA.  Patient was then intubated.  Patient had a total of 3 additional rounds of epinephrine, and then ROSC was obtained.  Patient remained unresponsive, with fixed and dilated pupils.  Initially patient's oxygen saturation was 75, but after patient was placed on the ventilator, oxygen saturation improved to 96% with a FiO2 of 100%.  Patient was discussed with patient's family, in which patient is a full code.  It is reported that patient lives with her 15-year-old, 13-year-old, and 11-year-old grandchildren, who were the ones that called EMS.     In the ED, labs were obtained with labs as follows: WBC 21.95, hemoglobin 14.6, hematocrit 49.9, platelets 154, alkaline phosphatase 113, , , sodium 140, potassium 3.2, chloride 97, CO2 14.7, BUN 24, creatinine 2.13, glucose 517, troponin 1.3, lactate 8.7.  Patient had a urinalysis completed which revealed 3+ bacteria and urine culture was reflexed.  Patient also was given 2 L of IV fluids, Rocephin 2 g, Lasix 20 mg, sodium bicarbonate x1 amp.  Chest x-ray reveals \"diffuse bilateral lung opacities with some partial scarring of the upper left lung.  Lung infiltrates are increased from previous exam of earlier today.  Probable left pleural effusion.\"  CT PE protocol was also obtained with the following findings: \"No central pulmonary embolism, aortic aneurysm or dissection.  Minimally displaced fracture of the inferior sternal body with multiple bilateral anterior rib fractures.  Multiple pulmonary nodules with a left inferior hilar mass and mediastinal " "adenopathy similar to slightly increased in size from comparison exam.  Superimposed airspace opacity seen in the left upper lobe anteriorly and left lower lobe suspicious for superimposed atelectasis and infiltrate.  Correlate clinically and interval follow-up can document resolution.  Mosaic attenuation seen bilaterally with relative toxicity of vasculature in the lucent regions favoring air trapping and diffuse small airway disease.\"  CT of the head was obtained with the following impression: \"Study is limited by motion and resultant streak artifact.  Within these limitations, there is no CT evidence for acute intracranial hemorrhage or acute territorial infarction.  Initially, [facility] was contacted for transfer to ICU for further treatment and evaluation of patient condition.  Initial plans were for potential TTM, as patient did remain unresponsive, but unfortunately there were delays in transfer and outlying facility did not initiate hypothermia measures, and patient's ROSC was greater than 6 hours outside the window for current evidence-based recommendations.     Pulmonary/Intensivist service was contacted for admission to ICU and further evaluation and treatment.    2/4: Intubated, sedated  2/5: Intubated, lightly sedated, without responsiveness.    REVIEW OF SYSTEMS:  Review of systems could not be obtained due to   patient sedation status. patient intubated.    MEDICATIONS     SCHEDULED MEDICATIONS:   Acetylcysteine, 600 mg, Per G Tube, BID  albuterol sulfate HFA, 6 puff, Inhalation, Q4H - RT  ascorbic acid, 500 mg, Oral, Daily  aspirin, 324 mg, Per G Tube, Daily  budesonide-formoterol, 2 puff, Inhalation, BID - RT  chlorhexidine, 15 mL, Mouth/Throat, Q12H  cholecalciferol, 3,000 Units, Oral, Daily  clopidogrel, 75 mg, Oral, Daily  dexamethasone, 6 mg, Intravenous, Q12H  guaiFENesin, 400 mg, Oral, Q6H  heparin (porcine), 5,000 Units, Subcutaneous, Q12H  insulin glargine, 15 Units, Subcutaneous, " "Q12H  insulin lispro, 0-24 Units, Subcutaneous, Q6H  insulin regular, 10 Units, Intravenous, Once  metoclopramide, 5 mg, Intravenous, Q8H  pantoprazole, 40 mg, Intravenous, Q12H  piperacillin-tazobactam, 3.375 g, Intravenous, Q12H  potassium chloride, 10 mEq, Intravenous, Q1H  sodium chloride, 10 mL, Intravenous, Q12H  zinc sulfate, 220 mg, Oral, Daily        CONTINUOUS INFUSIONS:   fentanyl 10 mcg/mL,  mcg/hr, Last Rate: 250 mcg/hr (02/05/21 0925)  Pharmacy to Dose Zosyn,   sodium bicarbonate drip (greater than 75 mEq/bag), 100 mEq, Last Rate: 100 mEq (02/05/21 0258)        PRN MEDS:  •  acetaminophen **OR** acetaminophen  •  aluminum-magnesium hydroxide-simethicone  •  dextrose  •  dextrose  •  glucagon (human recombinant)  •  insulin lispro **AND** insulin lispro  •  ipratropium-albuterol  •  midazolam  •  nitroglycerin  •  ondansetron **OR** ondansetron  •  Pharmacy to Dose Zosyn  •  sodium chloride    OBJECTIVE     VITAL SIGNS:  /84   Pulse 111   Temp 96.9 °F (36.1 °C) (Oral)   Resp 28   Ht 154.9 cm (61\")   Wt 96.6 kg (213 lb)   LMP  (LMP Unknown)   SpO2 94%   BMI 40.25 kg/m²     I/O FROM PREVIOUS 3 SHIFTS:  I/O last 3 completed shifts:  In: 2629 [I.V.:2358; NG/GT:271]  Out: 140 [Urine:140]    I/O THIS SHIFT:  No intake/output data recorded.    BOWEL MOVEMENTS:  Stool Output  Stool Amount: small (02/04/21 0815)     PHYSICAL EXAM:  Constitutional:  Well developed, well nourished, no acute distress, non-toxic appearance, chronically ill-appearing  Eyes: Pupils fixed, dilated, nonreactive to light, conjunctiva normal, unable to assess EOM, patient unresponsive  HENT:  Atraumatic, external ears normal, nose normal, oropharynx moist, no pharyngeal exudates. Neck-normal range of motion, no tenderness, supple, trachea midline  Respiratory: Coarse breath sounds throughout, diminished bibasilar breath sounds, scattered wheezes and rhonchi noted, bibasilar Rales, tachypneic but with non-labored " respirations without accessory muscle use  Cardiovascular: Elevated rate, slightly irregular rhythm,+PJ, no gallops, no rubs   GI: Obese, soft, nondistended, normal bowel sounds, nontender, no organomegaly, no mass, no rebound, no guarding   :  No costovertebral angle tenderness, Schneider catheter in place  Musculoskeletal: Bilateral lower extremity edema 2+, no tenderness, no deformities  Integument:  Well hydrated, no rash   Lymphatic:  No lymphadenopathy noted   Neurologic: Intubated, sedated, will not follow commands, unresponsive.  Psychiatric: Intubated, sedated      RESULTS     LABS:  Lab Results (last 24 hours)     Procedure Component Value Units Date/Time    C-reactive Protein [[id number]]  (Abnormal) Collected: 02/05/21 0807    Specimen: Blood Updated: 02/05/21 0927     C-Reactive Protein 20.15 mg/dL     Ferritin [[id number]]  (Abnormal) Collected: 02/05/21 0807    Specimen: Blood Updated: 02/05/21 0912     Ferritin 5,413.00 ng/mL     Narrative:      Results may be falsely decreased if patient taking Biotin.      Phosphorus [[id number]]  (Normal) Collected: 02/05/21 0807    Specimen: Blood Updated: 02/05/21 0853     Phosphorus 2.9 mg/dL     Comprehensive Metabolic Panel [[id number]]  (Abnormal) Collected: 02/05/21 0807    Specimen: Blood Updated: 02/05/21 0849     Glucose 375 mg/dL      BUN 67 mg/dL      Creatinine 3.99 mg/dL      Sodium 136 mmol/L      Potassium 3.0 mmol/L      Chloride 96 mmol/L      CO2 21.0 mmol/L      Calcium 8.6 mg/dL      Total Protein 5.3 g/dL      Albumin 2.30 g/dL      ALT (SGPT) 549 U/L      AST (SGOT) 415 U/L      Alkaline Phosphatase 141 U/L      Total Bilirubin 0.5 mg/dL      eGFR Non African Amer 11 mL/min/1.73      Comment: <15 Indicative of kidney failure.        eGFR   Amer --     Comment: <15 Indicative of kidney failure.        Globulin 3.0 gm/dL      A/G Ratio 0.8 g/dL      BUN/Creatinine Ratio 16.8     Anion Gap 19.0 mmol/L     Narrative:      GFR Normal  >60  Chronic Kidney Disease <60  Kidney Failure <15      CK [[id number]]  (Abnormal) Collected: 02/05/21 0807    Specimen: Blood Updated: 02/05/21 0849     Creatine Kinase 877 U/L     Magnesium [[id number]]  (Normal) Collected: 02/05/21 0807    Specimen: Blood Updated: 02/05/21 0849     Magnesium 2.0 mg/dL     D-dimer, Quantitative [[id number]]  (Abnormal) Collected: 02/05/21 0807    Specimen: Blood Updated: 02/05/21 0843     D-Dimer, Quantitative >35.20 mg/L (FEU)     Narrative:      Reference Range  --------------------------------------------------------------------     < 0.50   Negative Predictive Value  0.50-0.59   Indeterminate    >= 0.60   Probable VTE             A very low percentage of patients with DVT may yield D-Dimer results   below the cut-off of 0.50 mg/L FEU.  This is known to be more   prevalent in patients with distal DVT.             Results of this test should always be interpreted in conjunction with   the patient's medical history, clinical presentation and other   findings.  Clinical diagnosis should not be based on the result of   [place] D-Dimer alone.    Lactic Acid, Plasma [[id number]]  (Abnormal) Collected: 02/05/21 0807    Specimen: Blood Updated: 02/05/21 0837     Lactate 3.8 mmol/L     CBC & Differential [[id number]]  (Abnormal) Collected: 02/05/21 0807    Specimen: Blood Updated: 02/05/21 0817    Narrative:      The following orders were created for panel order CBC & Differential.  Procedure                               Abnormality         Status                     ---------                               -----------         ------                     CBC Auto Differential[[id number]]        Abnormal            Final result                 Please view results for these tests on the individual orders.    CBC Auto Differential [[id number]]  (Abnormal) Collected: 02/05/21 0807    Specimen: Blood Updated: 02/05/21 0817     WBC 12.50 10*3/mm3      RBC 4.59 10*6/mm3      Hemoglobin 12.6 g/dL       Hematocrit 37.5 %      MCV 81.7 fL      MCH 27.3 pg      MCHC 33.5 g/dL      RDW 16.8 %      RDW-SD 48.6 fl      MPV 9.4 fL      Platelets 92 10*3/mm3      Neutrophil % 92.8 %      Lymphocyte % 3.3 %      Monocyte % 3.7 %      Eosinophil % 0.1 %      Basophil % 0.1 %      Neutrophils, Absolute 11.60 10*3/mm3      Lymphocytes, Absolute 0.40 10*3/mm3      Monocytes, Absolute 0.50 10*3/mm3      Eosinophils, Absolute 0.00 10*3/mm3      Basophils, Absolute 0.00 10*3/mm3      nRBC 0.2 /100 WBC     POC Glucose Once [[id number]]  (Abnormal) Collected: 02/05/21 0504    Specimen: Blood Updated: 02/05/21 0504     Glucose 292 mg/dL      Comment: Serial Number: [id number]:  [id number]       Blood Gas, Arterial - [[id number]]  (Abnormal) Collected: 02/05/21 0447    Specimen: Arterial Blood Updated: 02/05/21 0453     Site Left Radial     Jesus's Test Positive     pH, Arterial 7.416 pH units      pCO2, Arterial 29.7 mm Hg      pO2, Arterial 59.9 mm Hg      HCO3, Arterial 19.1 mmol/L      Base Excess, Arterial -4.4 mmol/L      Comment: Serial Number: [id number]:  [id number]        O2 Saturation, Arterial 91.4 %      CO2 Content 20.0 mmol/L      Barometric Pressure for Blood Gas --     Comment: N/A        Modality Adult Vent     FIO2 55 %      Ventilator Mode ;AC     Set Tidal Volume 520     PEEP 12     Hemodilution No     Respiratory Rate 28    Lactic Acid, Plasma [[id number]]  (Abnormal) Collected: 02/05/21 0114    Specimen: Blood Updated: 02/05/21 0152     Lactate 4.4 mmol/L     Basic Metabolic Panel [[id number]]  (Abnormal) Collected: 02/05/21 0114    Specimen: Blood Updated: 02/05/21 0141     Glucose 307 mg/dL      BUN 59 mg/dL      Creatinine 4.20 mg/dL      Sodium 138 mmol/L      Potassium 3.5 mmol/L      Chloride 102 mmol/L      CO2 18.0 mmol/L      Calcium 8.6 mg/dL      eGFR   Amer --     Comment: <15 Indicative of kidney failure.        eGFR Non African Amer 10 mL/min/1.73      Comment: <15 Indicative of kidney  failure.        BUN/Creatinine Ratio 14.0     Anion Gap 18.0 mmol/L     Narrative:      GFR Normal >60  Chronic Kidney Disease <60  Kidney Failure <15      Urinalysis, Microscopic Only - Urine, Catheter [[id number]]  (Abnormal) Collected: 02/04/21 2025    Specimen: Urine, Catheter Updated: 02/04/21 2333     RBC, UA 3-5 /HPF      WBC, UA 6-12 /HPF      Bacteria, UA None Seen /HPF      Squamous Epithelial Cells, UA 3-6 /HPF      Hyaline Casts, UA None Seen /LPF      Amorphous Crystals, UA Moderate/2+ /HPF      Methodology Manual Light Microscopy    Urinalysis With Microscopic If Indicated (No Culture) - Urine, Catheter [[id number]]  (Abnormal) Collected: 02/04/21 2025    Specimen: Urine, Catheter Updated: 02/04/21 2303     Color, UA Yellow     Appearance, UA Hazy     Comment: Result checked         pH, UA <=5.0     Specific Gravity, UA 1.018     Glucose,  mg/dL (Trace)     Ketones, UA Negative     Bilirubin, UA Negative     Blood, UA Large (3+)     Protein, UA 30 mg/dL (1+)     Leuk Esterase, UA Negative     Nitrite, UA Negative     Urobilinogen, UA 0.2 E.U./dL    POC Glucose Once [[id number]]  (Abnormal) Collected: 02/04/21 2027    Specimen: Blood Updated: 02/04/21 2028     Glucose 321 mg/dL      Comment: Serial Number: [id number]:  [id number]       Basic Metabolic Panel [[id number]]  (Abnormal) Collected: 02/04/21 1826    Specimen: Blood Updated: 02/04/21 1909     Glucose 339 mg/dL      BUN 55 mg/dL      Creatinine 3.77 mg/dL      Sodium 136 mmol/L      Potassium 3.4 mmol/L      Comment: Slight hemolysis detected by analyzer. Results may be affected.        Chloride 101 mmol/L      CO2 18.0 mmol/L      Calcium 8.7 mg/dL      eGFR   Amer --     Comment: <15 Indicative of kidney failure.        eGFR Non African Amer 12 mL/min/1.73      Comment: <15 Indicative of kidney failure.        BUN/Creatinine Ratio 14.6     Anion Gap 17.0 mmol/L     Narrative:      GFR Normal >60  Chronic Kidney Disease  <60  Kidney Failure <15      Lactic Acid, Plasma [[id number]]  (Abnormal) Collected: 02/04/21 1826    Specimen: Blood Updated: 02/04/21 1859     Lactate 4.5 mmol/L     Blood Culture - Blood, Hand, Left [[id number]] Collected: 02/03/21 1800    Specimen: Blood from Hand, Left Updated: 02/04/21 1815     Blood Culture No growth at 24 hours    Blood Culture - Blood, Arm, Right [[id number]] Collected: 02/03/21 1800    Specimen: Blood from Arm, Right Updated: 02/04/21 1815     Blood Culture No growth at 24 hours    POC Glucose Once [[id number]]  (Abnormal) Collected: 02/04/21 1646    Specimen: Blood Updated: 02/04/21 1655     Glucose 301 mg/dL      Comment: Serial Number: [id number]:  [id number]       Creatinine, Urine, Random - Urine, Catheter [[id number]] Collected: 02/04/21 1135    Specimen: Urine, Catheter Updated: 02/04/21 1634     Creatinine, Urine 47.9 mg/dL     Narrative:      Reference intervals for random urine have not been established.  Clinical usage is dependent upon physician's interpretation in combination with other laboratory tests.       Troponin [[id number]]  (Abnormal) Collected: 02/04/21 1351    Specimen: Blood Updated: 02/04/21 1434     Troponin T 0.419 ng/mL     Narrative:      Troponin T Reference Range:  <= 0.03 ng/mL-   Negative for AMI  >0.03 ng/mL-     Abnormal for myocardial necrosis.  Clinicians would have to utilize clinical acumen, EKG, Troponin and serial changes to determine if it is an Acute Myocardial Infarction or myocardial injury due to an underlying chronic condition.       Results may be falsely decreased if patient taking Biotin.      Basic Metabolic Panel [[id number]]  (Abnormal) Collected: 02/04/21 1351    Specimen: Blood Updated: 02/04/21 1431     Glucose 314 mg/dL      BUN 51 mg/dL      Creatinine 3.43 mg/dL      Sodium 138 mmol/L      Potassium 4.0 mmol/L      Comment: Slight hemolysis detected by analyzer. Results may be affected.        Chloride 104 mmol/L      CO2 15.0  mmol/L      Calcium 8.4 mg/dL      eGFR   Amer --     Comment: <15 Indicative of kidney failure.        eGFR Non African Amer 13 mL/min/1.73      Comment: <15 Indicative of kidney failure.        BUN/Creatinine Ratio 14.9     Anion Gap 19.0 mmol/L     Narrative:      GFR Normal >60  Chronic Kidney Disease <60  Kidney Failure <15      Lactic Acid, Plasma [[id number]]  (Abnormal) Collected: 02/04/21 1351    Specimen: Blood Updated: 02/04/21 1418     Lactate 5.1 mmol/L     Chloride, Urine, Random - Urine, Catheter [[id number]] Collected: 02/04/21 1135    Specimen: Urine, Catheter Updated: 02/04/21 1154     Chloride, Urine 44 mmol/L     Narrative:      Reference intervals for random urine have not been established.  Clinical usage is dependent upon physician's interpretation in combination with other laboratory tests.       Sodium, Urine, Random - Urine, Catheter [[id number]] Collected: 02/04/21 1135    Specimen: Urine, Catheter Updated: 02/04/21 1154     Sodium, Urine 51 mmol/L     Narrative:      Reference intervals for random urine have not been established.  Clinical usage is dependent upon physician's interpretation in combination with other laboratory tests.       POC Glucose Once [[id number]]  (Abnormal) Collected: 02/04/21 1120    Specimen: Blood Updated: 02/04/21 1137     Glucose 304 mg/dL      Comment: Serial Number: [id number]:  [id number]       Respiratory Culture - Sputum, ET Suction [[id number]] Collected: 02/04/21 0840    Specimen: Sputum from ET Suction Updated: 02/04/21 1040     Gram Stain Few (2+) WBCs per low power field      Rare (1+) Yeast           RADIOLOGY:  Xr Chest 1 View    Result Date: 2/5/2021  XR CHEST 1 VW-  Date of Exam: 2/5/2021 6:16 AM  Indication: Intubated Patient.  Comparison: 2/4/2021  Technique: A single view of the chest was obtained.  FINDINGS:   Endotracheal tube and nasogastric tube remain in place unchanged in position.  Cardiac leads stable.  Pulmonary vessels are  remain indistinct compatible with pulmonary vascular congestion.  There is hazy bilateral airspace disease which allowing for differences in technique is not significantly changed from prior study.  No new or worsening airspace consolidation.  No evidence of pneumothorax or pleural effusion.          1.  No interval tube or line change. 2.  No significant change in hazy bilateral airspace disease.   Electronically Signed By-Yakov Yang MD On:2/5/2021 7:14 AM This report was finalized on [id number] by  Yakov Yang MD.    Us Renal Bilateral    Result Date: 2/4/2021  Examination: US RENAL BILATERAL-  Date of Exam: 2/4/2021 9:14 PM  Indication: AMBER.  Comparison: None available.  Technique: Grayscale and color Doppler ultrasound evaluation of the kidneys and urinary bladder was performed  Findings: The right kidney measures 12.5 x 5 x 4.8 cm and the left kidney measures 2.2 x 6.4 x 5.4  cm. There is mild increased cortical echogenicity with prominence of the renal pyramids consistent with medical renal disease. There is no solid kidney mass.  No echogenic shadowing stone.  No hydronephrosis.   The bladder is decompressed by Schneider catheter      Increased renal cortical echogenicity consistent with medical renal disease.  Electronically Signed By-Scooter Nation MD On:2/4/2021 10:24 PM This report was finalized on [id number] by  Scooter Nation MD.      ECHOCARDIOGRAM:  Results for orders placed during the hospital encounter of 02/03/21   Adult Transthoracic Echo Limited W/ Cont if Necessary Per Protocol    Narrative · Left ventricular wall thickness is consistent with mild concentric   hypertrophy.  · Estimated left ventricular EF = 55% Left ventricular systolic function   is normal.  · The right ventricular cavity is moderate to severely dilated.  · Moderately reduced right ventricular systolic function noted.  · The right atrial cavity is mildly dilated.           I reviewed the patient's new clinical  results.    This note has been scribed by me for pulmonary attending physician.    Electronically signed by DWIGHT Baez, 02/05/21 at 09:58 EST.     I personally have examined  and interviewed the patient. I have reviewed the history, data, problems, assessment and plan with our NP.  Critical care time in direct medical management (   ) minutes   Pamela Cope MD, D,ABSM  2/5/2021

## 2021-02-06 LAB
BACTERIA SPEC RESP CULT: NORMAL
GRAM STN SPEC: NORMAL
GRAM STN SPEC: NORMAL

## 2021-02-06 NOTE — DISCHARGE SUMMARY
"  Date of Death:  2/5/2021  Time of Death:  1914  Cause of Death: COVID - 19, Community Acquired PNA, Acute respiratory failure with hypoxia, and NSTEMI further complicated by Primary malignant neuroendocrine tumor and secondary malignant neoplasm of lung stage IV    Date of Admission: 2/3/2021  3:32 PM  Social History     Tobacco Use   • Smoking status: Former Smoker   • Smokeless tobacco: Never Used   Substance Use Topics   • Alcohol use: Never     Frequency: Never   • Drug use: Never           Presenting Problem/History of Present Illness  Cardiopulmonary arrest with successful resuscitation (CMS/[facility]) [I46.9]      Hospital Course  Information obtained and some copied directly from EMR  Admit: 02/03/2021      Per H&P:  \"Christiane Heck is a 71 y.o. female  has a past medical history of Anxiety, Arteriosclerosis of coronary artery (2/3/2021), Back pain, chronic (2/3/2021), CAD (coronary artery disease), CHF (congestive heart failure) (CMS/[facility]), Chronic osteoarthritis (2/3/2021), Congestive heart failure (CMS/HCC) (2/3/2021), Depressive disorder (2/3/2021), Diabetes mellitus (CMS/[facility]), Former smoker, stopped smoking in distant past, GERD (gastroesophageal reflux disease), HLD (hyperlipidemia), Hypertension, Malignant neoplasm of lung (CMS/[facility]) (2/3/2021), Muscle ache, Neuroendocrine carcinoma (CMS/[facility]), 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  The following has been culminated from review of outlying facility documentation, as patient presents to this facility intubated, sedated with no family currently present.  Additional past medical history includes stage IV lung cancer.  Patient initially presented to [facility] via EMS with a 1 day history of headache, progressive shortness of breath, coughing, vomiting.  EMS upon their arrival " "stated that patient's oxygen saturation was 50%.  Patient was immediately started on BiPAP with 100% FiO2 with an improvement in oxygen saturation to the 70s.  At that time, patient was alert and responsive.  In route, patient became bradycardic with IV atropine administered.  Patient then followed with unresponsiveness and asystole on monitor.  2 rounds of IV epinephrine was administered with CPR.  Upon arrival to outlying facility, patient continued in asystole, in which CPR continued.  Patient initially had a supraglottic airway upon presentation.  Additional IV epinephrine was administered along with aggressive IV fluid bolusing.  Initial heart rhythm was asystole, and upon pulse check, patient was in PEA.  Patient was then intubated.  Patient had a total of 3 additional rounds of epinephrine, and then ROSC was obtained.  Patient remained unresponsive, with fixed and dilated pupils.  Initially patient's oxygen saturation was 75, but after patient was placed on the ventilator, oxygen saturation improved to 96% with a FiO2 of 100%.  Patient was discussed with patient's family, in which patient is a full code.  It is reported that patient lives with her 15-year-old, 13-year-old, and 11-year-old grandchildren, who were the ones that called EMS.     In the ED, labs were obtained with labs as follows: WBC 21.95, hemoglobin 14.6, hematocrit 49.9, platelets 154, alkaline phosphatase 113, , , sodium 140, potassium 3.2, chloride 97, CO2 14.7, BUN 24, creatinine 2.13, glucose 517, troponin 1.3, lactate 8.7.  Patient had a urinalysis completed which revealed 3+ bacteria and urine culture was reflexed.  Patient also was given 2 L of IV fluids, Rocephin 2 g, Lasix 20 mg, sodium bicarbonate x1 amp.  Chest x-ray reveals \"diffuse bilateral lung opacities with some partial scarring of the upper left lung.  Lung infiltrates are increased from previous exam of earlier today.  Probable left pleural effusion.\"  CT PE " "protocol was also obtained with the following findings: \"No central pulmonary embolism, aortic aneurysm or dissection.  Minimally displaced fracture of the inferior sternal body with multiple bilateral anterior rib fractures.  Multiple pulmonary nodules with a left inferior hilar mass and mediastinal adenopathy similar to slightly increased in size from comparison exam.  Superimposed airspace opacity seen in the left upper lobe anteriorly and left lower lobe suspicious for superimposed atelectasis and infiltrate.  Correlate clinically and interval follow-up can document resolution.  Mosaic attenuation seen bilaterally with relative toxicity of vasculature in the lucent regions favoring air trapping and diffuse small airway disease.\"  CT of the head was obtained with the following impression: \"Study is limited by motion and resultant streak artifact.  Within these limitations, there is no CT evidence for acute intracranial hemorrhage or acute territorial infarction.  Initially, [facility] was contacted for transfer to ICU for further treatment and evaluation of patient condition.  Initial plans were for potential TTM, as patient did remain unresponsive, but unfortunately there were delays in transfer and outlying facility did not initiate hypothermia measures, and patient's ROSC was greater than 6 hours outside the window for current evidence-based recommendations.\"      During her ICU admission consults were made to Nephrology for AMBER and elevated CK level, Cardiology consulted for further evaluation of NSTEMI and cardiac arrest, Palliative care team for goals of care, Infectious Disease for antibiotic management, and Neurology for encephalopathy.  She was on antibiotic of Zosyn.  She remained with ventilator support.  She was unable to receive Remdesivir due to shock liver and AMBER.       \"Patient's two sons had previously called and expressed, after hearing from neurologist about poor prognostic picture, " "had decided to proceed with comfort measures.  Patient daughter, called nursing and stated that she would also like to pursue comfort measures, but she has asked for us to call and let her know that she  naturally, as she does not want her children to know she agreed with withdrawing artificial means of life support.  She again expressed to me that she was ok with proceeding with this.  When speaking with daughter on the phone.  Nursing is also aware.  There also were social service issues, as patient has custody of this daughter's children, that this daughter mentioned to nursing staff.  It was again verified with patient's 2 sons, and all are now in agreement with changing patient's code status to No CPR, Comfort measures.  All medications have been ordered as appropriate with comfort measures protocols.  Dr. Cope is aware and agrees with this plan of care.\"  The patient was terminally extubated at 1845.  The patient  on 21 at 1914.              Procedures Performed:         Consults:   Consults     Date and Time Order Name Status Description    2021 0957 Inpatient Neurology Consult General Completed     2021 0032 Inpatient Nephrology Consult Completed     2021 0032 Inpatient Infectious Diseases Consult Completed     2/3/2021 211 Inpatient Cardiology Consult Completed           Labs:    Lab Results (last 24 hours)     Procedure Component Value Units Date/Time    Blood Culture - Blood, Hand, Left [[id number]] Collected: 21 1800    Specimen: Blood from Hand, Left Updated: 21     Blood Culture No growth at 2 days    Blood Culture - Blood, Arm, Right [[id number]] Collected: 21 1800    Specimen: Blood from Arm, Right Updated: 21 181     Blood Culture No growth at 2 days    Potassium [[id number]]  (Normal) Collected: 21 1700    Specimen: Blood Updated: 21 173     Potassium 3.5 mmol/L      Comment: Slight hemolysis detected by analyzer. Results may " be affected.       POC Glucose Once [[id number]]  (Abnormal) Collected: 02/05/21 1648    Specimen: Blood Updated: 02/05/21 1650     Glucose 177 mg/dL      Comment: Serial Number: [id number]:  [id number]       Urine Culture - Urine, Urine, Clean Catch [[id number]]  (Normal) Collected: 02/04/21 0051    Specimen: Urine, Clean Catch Updated: 02/05/21 1159     Urine Culture No growth    POC Glucose Once [[id number]]  (Abnormal) Collected: 02/05/21 1106    Specimen: Blood Updated: 02/05/21 1108     Glucose 283 mg/dL      Comment: Serial Number: [id number]:  [id number]       Respiratory Culture - Sputum, ET Suction [[id number]] Collected: 02/04/21 0840    Specimen: Sputum from ET Suction Updated: 02/05/21 1024     Respiratory Culture Scant growth (1+) Normal Respiratory Faye: NO S.aureus/MRSA or Pseudomonas aeruginosa     Gram Stain Few (2+) WBCs per low power field      Rare (1+) Yeast    C-reactive Protein [[id number]]  (Abnormal) Collected: 02/05/21 0807    Specimen: Blood Updated: 02/05/21 0927     C-Reactive Protein 20.15 mg/dL     Ferritin [[id number]]  (Abnormal) Collected: 02/05/21 0807    Specimen: Blood Updated: 02/05/21 0912     Ferritin 5,413.00 ng/mL     Narrative:      Results may be falsely decreased if patient taking Biotin.      Phosphorus [[id number]]  (Normal) Collected: 02/05/21 0807    Specimen: Blood Updated: 02/05/21 0853     Phosphorus 2.9 mg/dL     Comprehensive Metabolic Panel [[id number]]  (Abnormal) Collected: 02/05/21 0807    Specimen: Blood Updated: 02/05/21 0849     Glucose 375 mg/dL      BUN 67 mg/dL      Creatinine 3.99 mg/dL      Sodium 136 mmol/L      Potassium 3.0 mmol/L      Chloride 96 mmol/L      CO2 21.0 mmol/L      Calcium 8.6 mg/dL      Total Protein 5.3 g/dL      Albumin 2.30 g/dL      ALT (SGPT) 549 U/L      AST (SGOT) 415 U/L      Alkaline Phosphatase 141 U/L      Total Bilirubin 0.5 mg/dL      eGFR Non African Amer 11 mL/min/1.73      Comment: <15 Indicative of kidney  failure.        eGFR   Amer --     Comment: <15 Indicative of kidney failure.        Globulin 3.0 gm/dL      A/G Ratio 0.8 g/dL      BUN/Creatinine Ratio 16.8     Anion Gap 19.0 mmol/L     Narrative:      GFR Normal >60  Chronic Kidney Disease <60  Kidney Failure <15      CK [[id number]]  (Abnormal) Collected: 02/05/21 0807    Specimen: Blood Updated: 02/05/21 0849     Creatine Kinase 877 U/L     Magnesium [[id number]]  (Normal) Collected: 02/05/21 0807    Specimen: Blood Updated: 02/05/21 0849     Magnesium 2.0 mg/dL     D-dimer, Quantitative [[id number]]  (Abnormal) Collected: 02/05/21 0807    Specimen: Blood Updated: 02/05/21 0843     D-Dimer, Quantitative >35.20 mg/L (FEU)     Narrative:      Reference Range  --------------------------------------------------------------------     < 0.50   Negative Predictive Value  0.50-0.59   Indeterminate    >= 0.60   Probable VTE             A very low percentage of patients with DVT may yield D-Dimer results   below the cut-off of 0.50 mg/L FEU.  This is known to be more   prevalent in patients with distal DVT.             Results of this test should always be interpreted in conjunction with   the patient's medical history, clinical presentation and other   findings.  Clinical diagnosis should not be based on the result of   [place] D-Dimer alone.    Lactic Acid, Plasma [[id number]]  (Abnormal) Collected: 02/05/21 0807    Specimen: Blood Updated: 02/05/21 0837     Lactate 3.8 mmol/L     CBC & Differential [[id number]]  (Abnormal) Collected: 02/05/21 0807    Specimen: Blood Updated: 02/05/21 0817    Narrative:      The following orders were created for panel order CBC & Differential.  Procedure                               Abnormality         Status                     ---------                               -----------         ------                     CBC Auto Differential[[id number]]        Abnormal            Final result                 Please view results for  these tests on the individual orders.    CBC Auto Differential [[id number]]  (Abnormal) Collected: 02/05/21 0807    Specimen: Blood Updated: 02/05/21 0817     WBC 12.50 10*3/mm3      RBC 4.59 10*6/mm3      Hemoglobin 12.6 g/dL      Hematocrit 37.5 %      MCV 81.7 fL      MCH 27.3 pg      MCHC 33.5 g/dL      RDW 16.8 %      RDW-SD 48.6 fl      MPV 9.4 fL      Platelets 92 10*3/mm3      Neutrophil % 92.8 %      Lymphocyte % 3.3 %      Monocyte % 3.7 %      Eosinophil % 0.1 %      Basophil % 0.1 %      Neutrophils, Absolute 11.60 10*3/mm3      Lymphocytes, Absolute 0.40 10*3/mm3      Monocytes, Absolute 0.50 10*3/mm3      Eosinophils, Absolute 0.00 10*3/mm3      Basophils, Absolute 0.00 10*3/mm3      nRBC 0.2 /100 WBC     POC Glucose Once [[id number]]  (Abnormal) Collected: 02/05/21 0504    Specimen: Blood Updated: 02/05/21 0504     Glucose 292 mg/dL      Comment: Serial Number: [id number]:  [id number]       Blood Gas, Arterial - [[id number]]  (Abnormal) Collected: 02/05/21 0447    Specimen: Arterial Blood Updated: 02/05/21 0453     Site Left Radial     Jesus's Test Positive     pH, Arterial 7.416 pH units      pCO2, Arterial 29.7 mm Hg      pO2, Arterial 59.9 mm Hg      HCO3, Arterial 19.1 mmol/L      Base Excess, Arterial -4.4 mmol/L      Comment: Serial Number: [id number]:  [id number]        O2 Saturation, Arterial 91.4 %      CO2 Content 20.0 mmol/L      Barometric Pressure for Blood Gas --     Comment: N/A        Modality Adult Vent     FIO2 55 %      Ventilator Mode ;AC     Set Tidal Volume 520     PEEP 12     Hemodilution No     Respiratory Rate 28    Lactic Acid, Plasma [[id number]]  (Abnormal) Collected: 02/05/21 0114    Specimen: Blood Updated: 02/05/21 0152     Lactate 4.4 mmol/L     Basic Metabolic Panel [[id number]]  (Abnormal) Collected: 02/05/21 0114    Specimen: Blood Updated: 02/05/21 0141     Glucose 307 mg/dL      BUN 59 mg/dL      Creatinine 4.20 mg/dL      Sodium 138 mmol/L      Potassium 3.5  mmol/L      Chloride 102 mmol/L      CO2 18.0 mmol/L      Calcium 8.6 mg/dL      eGFR   Amer --     Comment: <15 Indicative of kidney failure.        eGFR Non African Amer 10 mL/min/1.73      Comment: <15 Indicative of kidney failure.        BUN/Creatinine Ratio 14.0     Anion Gap 18.0 mmol/L     Narrative:      GFR Normal >60  Chronic Kidney Disease <60  Kidney Failure <15      Urinalysis, Microscopic Only - Urine, Catheter [[id number]]  (Abnormal) Collected: 02/04/21 2025    Specimen: Urine, Catheter Updated: 02/04/21 2333     RBC, UA 3-5 /HPF      WBC, UA 6-12 /HPF      Bacteria, UA None Seen /HPF      Squamous Epithelial Cells, UA 3-6 /HPF      Hyaline Casts, UA None Seen /LPF      Amorphous Crystals, UA Moderate/2+ /HPF      Methodology Manual Light Microscopy    Urinalysis With Microscopic If Indicated (No Culture) - Urine, Catheter [[id number]]  (Abnormal) Collected: 02/04/21 2025    Specimen: Urine, Catheter Updated: 02/04/21 2303     Color, UA Yellow     Appearance, UA Hazy     Comment: Result checked         pH, UA <=5.0     Specific Gravity, UA 1.018     Glucose,  mg/dL (Trace)     Ketones, UA Negative     Bilirubin, UA Negative     Blood, UA Large (3+)     Protein, UA 30 mg/dL (1+)     Leuk Esterase, UA Negative     Nitrite, UA Negative     Urobilinogen, UA 0.2 E.U./dL            Imaging and Other Studies:  Imaging Results (Last 72 Hours)     Procedure Component Value Units Date/Time    XR Chest 1 View [[id number]] Collected: 02/05/21 0713     Updated: 02/05/21 0716    Narrative:      XR CHEST 1 VW-     Date of Exam: 2/5/2021 6:16 AM     Indication: Intubated Patient.     Comparison: 2/4/2021     Technique: A single view of the chest was obtained.     FINDINGS:      Endotracheal tube and nasogastric tube remain in place unchanged in  position.  Cardiac leads stable.  Pulmonary vessels are remain  indistinct compatible with pulmonary vascular congestion.  There is hazy  bilateral airspace  disease which allowing for differences in technique  is not significantly changed from prior study.  No new or worsening  airspace consolidation.  No evidence of pneumothorax or pleural  effusion.             Impression:            1.  No interval tube or line change.  2.  No significant change in hazy bilateral airspace disease.        Electronically Signed By-Yakov Yang MD On:2/5/2021 7:14 AM  This report was finalized on [id number] by  Yakov Yang MD.    US Renal Bilateral [[id number]] Collected: 02/04/21 2223     Updated: 02/04/21 2227    Narrative:      Examination: US RENAL BILATERAL-     Date of Exam: 2/4/2021 9:14 PM     Indication: AMBER.     Comparison: None available.     Technique: Grayscale and color Doppler ultrasound evaluation of the  kidneys and urinary bladder was performed     Findings:  The right kidney measures 12.5 x 5 x 4.8 cm and the left kidney measures  2.2 x 6.4 x 5.4  cm. There is mild increased cortical echogenicity with  prominence of the renal pyramids consistent with medical renal disease.  There is no solid kidney mass.  No echogenic shadowing stone.  No  hydronephrosis.        The bladder is decompressed by Schneider catheter       Impression:      Increased renal cortical echogenicity consistent with medical renal  disease.     Electronically Signed By-Scooter Nation MD On:2/4/2021 10:24 PM  This report was finalized on [id number] by  Scooter Nation MD.    XR Chest 1 View [[id number]] Collected: 02/04/21 0742     Updated: 02/04/21 0745    Narrative:         DATE OF EXAM:   2/4/2021 5:40 AM     PROCEDURE:   XR CHEST 1 VW-     INDICATIONS:   Intubated Patient     COMPARISON:  2/3/2021     TECHNIQUE:   Portable chest radiograph.     FINDINGS:    Placement of an esophagogastric tube courses below the diaphragm.  Endotracheal tube tip 4 cm above the adonay. Stable cardiomegaly.  Multifocal airspace disease throughout the lungs, not significantly  changed. No pneumothorax. Suspected  small left pleural effusion.       Impression:      1. Placement of an esophagogastric tube is below the diaphragm.  2. Stable endotracheal tube.  3. No change in multifocal airspace disease throughout the lungs with  small left pleural effusion.     Electronically Signed By-Demario Simmons MD On:2/4/2021 7:43 AM  This report was finalized on [id number] by  Demario Simmons MD.    XR Outside Films [[id number]] Resulted: 02/04/21 0626     Updated: 02/04/21 0626    Narrative:      This procedure was auto-finalized with no dictation required.    XR Outside Films [[id number]] Resulted: 02/04/21 0559     Updated: 02/04/21 0559    Narrative:      This procedure was auto-finalized with no dictation required.    CT Outside Films [[id number]] Resulted: 02/04/21 0558     Updated: 02/04/21 0558    Narrative:      This procedure was auto-finalized with no dictation required.    CT Outside Films [[id number]] Resulted: 02/04/21 0558     Updated: 02/04/21 0558    Narrative:      This procedure was auto-finalized with no dictation required.    CT Outside Films [[id number]] Resulted: 02/04/21 0557     Updated: 02/04/21 0557    Narrative:      This procedure was auto-finalized with no dictation required.    CT Outside Films [[id number]] Resulted: 02/04/21 0557     Updated: 02/04/21 0557    Narrative:      This procedure was auto-finalized with no dictation required.    XR Abdomen KUB [[id number]] Collected: 02/03/21 1829     Updated: 02/03/21 1837    Narrative:      DATE OF EXAM:  2/3/2021 6:18 PM     PROCEDURE:  XR ABDOMEN KUB-     INDICATIONS:  ng placement     COMPARISON:  No Comparisons Available     TECHNIQUE:   Single radiographic view of the abdomen was obtained.     FINDINGS:  There is a nasogastric tube in the mildly dilated stomach. There are  scattered pockets of large and small bowel gas in a nonspecific but  nonobstructive pattern.       Impression:      Nasogastric tube is in the stomach.     Electronically Signed By-Scooter Nation  MD On:2/3/2021 6:30 PM  This report was finalized on [id number] by  Scooter Nation MD.    XR Chest 1 View [[id number]] Collected: 02/03/21 1721     Updated: 02/03/21 1724    Narrative:      Examination: XR CHEST 1 VW-     Date of Exam: 2/3/2021 4:35 PM     Indication: Confirm ET Tube Placement.     Comparison: 11/10/2020     Technique: 1 view of the chest      Findings:  There is an endotracheal tube in the midthoracic trachea. The heart size  and pulmonary vascular markings are normal. There are diffuse bilateral  confluent alveolar airspace opacities consistent with multifocal  pneumonia. There is more dense consolidation in the left lung base.       Impression:      Diffuse bilateral alveolar airspace disease consistent with multifocal  pneumonia. ET tube appears properly positioned.     Electronically Signed By-Scooter Nation MD On:2/3/2021 5:22 PM  This report was finalized on [id number] by  Scooter Nation MD.                Electronically signed by DWIGHT Singh, 02/05/21, 8:47 PM EST.    I personally have examined  and interviewed the patient. I have reviewed the history, data, problems, assessment and plan with our NP.  Critical care time in direct medical management (   ) minutes   Pamela Cope MD, D,ABSM  2/5/2021

## 2021-02-08 ENCOUNTER — HOSPITAL ENCOUNTER (OUTPATIENT)
Dept: ONCOLOGY | Facility: HOSPITAL | Age: 71
Setting detail: INFUSION SERIES
End: 2021-02-08

## 2021-02-08 LAB
BACTERIA SPEC AEROBE CULT: NORMAL
BACTERIA SPEC AEROBE CULT: NORMAL

## 2021-02-08 NOTE — PROGRESS NOTES
Case Management Discharge Note                Selected Continued Care - Discharged on 2021 Admission date: 2/3/2021 - Discharge disposition:                  Final Discharge Disposition Code: 41 -  in medical facility

## (undated) DEVICE — DRAPE, RADIAL, STERILE: Brand: MEDLINE

## (undated) DEVICE — ELECTRD DEFIB M/FUNC PROPADZ RADIOL 2PK

## (undated) DEVICE — DRSNG SURESITE WNDW 4X4.5

## (undated) DEVICE — GLIDESHEATH SLENDER ACCESS KIT: Brand: GLIDESHEATH SLENDER

## (undated) DEVICE — PK TRY HEART CATH 50

## (undated) DEVICE — SKIN PREP TRAY W/CHG: Brand: MEDLINE INDUSTRIES, INC.

## (undated) DEVICE — GW EMR FIX EXCHG J STD .035 3MM 260CM

## (undated) DEVICE — CATH DIAG IMPULSE FR3.5 5F 100CM

## (undated) DEVICE — BND COMPR ZEPHYR VASC LG

## (undated) DEVICE — CATH DIAG IMPULSE AR2 6F 100CM

## (undated) DEVICE — RADIFOCUS OPTITORQUE ANGIOGRAPHIC CATHETER: Brand: OPTITORQUE